# Patient Record
Sex: MALE | Race: WHITE
[De-identification: names, ages, dates, MRNs, and addresses within clinical notes are randomized per-mention and may not be internally consistent; named-entity substitution may affect disease eponyms.]

---

## 2017-12-08 ENCOUNTER — HOSPITAL ENCOUNTER (EMERGENCY)
Dept: HOSPITAL 61 - ER | Age: 77
Discharge: HOME | End: 2017-12-08
Payer: MEDICARE

## 2017-12-08 VITALS — SYSTOLIC BLOOD PRESSURE: 115 MMHG | DIASTOLIC BLOOD PRESSURE: 67 MMHG

## 2017-12-08 VITALS — HEIGHT: 69 IN | WEIGHT: 175 LBS | BODY MASS INDEX: 25.92 KG/M2
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
## 2017-12-08 DIAGNOSIS — Z86.718: ICD-10-CM

## 2017-12-08 DIAGNOSIS — S16.1XXA: Primary | ICD-10-CM
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
## 2017-12-08 PROCEDURE — 99284 EMERGENCY DEPT VISIT MOD MDM: CPT

## 2017-12-08 PROCEDURE — 96372 THER/PROPH/DIAG INJ SC/IM: CPT

## 2017-12-08 NOTE — PHYS DOC
Past Medical History


Past Medical History:  Angina, DVT, Other


Additional Past Medical Histor:  CELLULITIS, back pain,


Past Surgical History:  Other


Additional Past Surgical Histo:  R LEG SURG


Alcohol Use:  Occasionally


Drug Use:  None





Adult General


Chief Complaint


Chief Complaint:  Neck Pain





HPI


HPI





Patient is a 77  year old male who presents with complaint of left-sided neck 

pain. Patient states his symptoms started earlier this evening. The patient 

states that he fell sleep in his recliner for a nap. Patient states that he 

awoke approximately 2-3 hours prior to arrival stating that he started feeling 

sharp pain in the left side of his neck as he woke. Patient states the pain 

radiates towards the proximal portion of the left shoulder but does not radiate 

into his left arm. Patient states that the pain worsens when he tries to turn 

his head to the left. Patient states that he does not feel any significant pain 

when turning his head to the right. Patient does not have any associated chest 

pain, vision loss, difficulty with speech or swallowing, or shortness of 

breath. Patient has not taken medications for his symptoms. Patient states he 

has not experienced any pain similar to what he is complaining of at this time 

which caused him concern, thus he came to the emergency department for 

evaluation. Patient rates his pain as 9 out of 10 one every tries to turn his 

head to the left.





Review of Systems


Review of Systems





Constitutional: Denies fever or chills []


Eyes: Denies change in visual acuity, redness, or eye pain []


HENT: Denies nasal congestion or sore throat []


Respiratory: Denies cough or shortness of breath []


Cardiovascular: Denies chest pain or edema[]


GI: Denies abdominal pain, nausea, vomiting, bloody stools or diarrhea []


: Denies dysuria or hematuria []


Musculoskeletal: Left-sided neck pain[]


Integument: Denies rash or skin lesions []


Neurologic: Denies headache, focal weakness or sensory changes []








All other systems were reviewed and found to be within normal limits, except as 

documented in this note.





Current Medications


Current Medications





Current Medications








 Medications


  (Trade)  Dose


 Ordered  Sig/Ying  Start Time


 Stop Time Status Last Admin


Dose Admin


 


 Orphenadrine


 Citrate


  (Norflex)  60 mg  1X  ONCE  12/8/17 21:15


 12/8/17 21:16 DC 12/8/17 21:16


60 MG


 


 Prednisone


  (Prednisone)  40 mg  1X  ONCE  12/8/17 21:15


 12/8/17 21:16 DC 12/8/17 21:13


40 MG











Allergies


Allergies





Allergies








Coded Allergies Type Severity Reaction Last Updated Verified


 


  No Known Drug Allergies    5/6/17 No











Physical Exam


Physical Exam





Constitutional: Alert, afebrile, appears in mild to moderate discomfort[]


HENT: Normocephalic, atraumatic, bilateral external ears normal, oropharynx 

moist, no oral exudates, nose normal. []


Eyes: PERRLA, EOMI, conjunctiva normal, no discharge. [] 


Neck: Tenderness palpation along left sternocleidomastoid muscle causing 

reproducible tenderness, palpable spasm, no midline tenderness, trachea midline

, no stridor. [] 


Cardiovascular:Heart rate regular rhythm, no murmur []


Lungs & Thorax:  Bilateral breath sounds clear to auscultation []


Abdomen: Bowel sounds normal, soft, no tenderness, no masses, no pulsatile 

masses. [] 


Skin: Warm, dry, no erythema, no rash. [] 


Back: No tenderness, no CVA tenderness. [] 


Extremities: No tenderness, no cyanosis, no clubbing, ROM intact, no edema. [] 


Neurologic: Alert and oriented X 3, normal motor function, normal sensory 

function, no focal deficits noted. []





Current Patient Data


Vital Signs





 Vital Signs








  Date Time  Temp Pulse Resp B/P (MAP) Pulse Ox O2 Delivery O2 Flow Rate FiO2


 


12/8/17 20:30 98.4 106 14 155/79 (104) 94 Room Air  





 98.4       











EKG


EKG


Not performed[]





Radiology/Procedures


Radiology/Procedures


Not performed[]





Course & Med Decision Making


Course & Med Decision Making


Pertinent Labs and Imaging studies reviewed. (See chart for details)





Patient was given IM Norflex and oral prednisone in the emergency department. 

The patient's symptoms appear consistent with acute neck muscle strain versus 

possible cervical radiculopathy. Patient will continue on Flexeril and 

prednisone for outpatient treatment. Advise follow-up in 3-5 days a primary 

doctor for reevaluation and return to emergency department for any worsening 

symptoms. Patient voiced understanding and in agreement with treatment plan.





Dragon Disclaimer


Dragon Disclaimer


This electronic medical record was generated, in whole or in part, using a 

voice recognition dictation system.





Departure


Departure


Impression:  


 Primary Impression:  


 Strain of sternocleidomastoid muscle


Disposition:  01 HOME, SELF-CARE


Condition:  IMPROVED


Referrals:  


NO PCP (PCP)


Patient Instructions:  Muscle Strain





Additional Instructions:  


Follow-up with your primary doctor in 3-5 days for reevaluation. Return to 

emergency department for any worsening symptoms.


Scripts


Cyclobenzaprine Hcl (CYCLOBENZAPRINE HCL) 10 Mg Tablet


1 TAB PO QHS Y for MUSCLE PAIN, #30 TAB


   Prov: NAKUL CERRATO MD         12/8/17 


Methylprednisolone (MEDROL) 4 Mg Tab.ds.pk


1 PKG PO UD, #1 PKG


   Prov: NAKUL CERRATO MD         12/8/17





Problem Qualifiers








 Primary Impression:  


 Strain of sternocleidomastoid muscle


 Encounter type:  initial encounter  Qualified Codes:  S16.1XXA - Strain of 

muscle, fascia and tendon at neck level, initial encounter








NAKUL CERRATO MD Dec 8, 2017 21:32

## 2018-12-19 ENCOUNTER — HOSPITAL ENCOUNTER (INPATIENT)
Dept: HOSPITAL 61 - ER | Age: 78
LOS: 9 days | Discharge: HOME | DRG: 872 | End: 2018-12-28
Attending: INTERNAL MEDICINE | Admitting: INTERNAL MEDICINE
Payer: MEDICARE

## 2018-12-19 VITALS — SYSTOLIC BLOOD PRESSURE: 136 MMHG | DIASTOLIC BLOOD PRESSURE: 73 MMHG

## 2018-12-19 VITALS — WEIGHT: 161.44 LBS | HEIGHT: 70 IN | BODY MASS INDEX: 23.11 KG/M2

## 2018-12-19 DIAGNOSIS — M19.90: ICD-10-CM

## 2018-12-19 DIAGNOSIS — F17.210: ICD-10-CM

## 2018-12-19 DIAGNOSIS — D45: ICD-10-CM

## 2018-12-19 DIAGNOSIS — L97.529: ICD-10-CM

## 2018-12-19 DIAGNOSIS — I10: ICD-10-CM

## 2018-12-19 DIAGNOSIS — N40.0: ICD-10-CM

## 2018-12-19 DIAGNOSIS — K40.90: ICD-10-CM

## 2018-12-19 DIAGNOSIS — Z79.01: ICD-10-CM

## 2018-12-19 DIAGNOSIS — E11.621: ICD-10-CM

## 2018-12-19 DIAGNOSIS — Z86.711: ICD-10-CM

## 2018-12-19 DIAGNOSIS — E87.6: ICD-10-CM

## 2018-12-19 DIAGNOSIS — L03.115: ICD-10-CM

## 2018-12-19 DIAGNOSIS — A41.9: Primary | ICD-10-CM

## 2018-12-19 DIAGNOSIS — Z83.3: ICD-10-CM

## 2018-12-19 DIAGNOSIS — Z82.49: ICD-10-CM

## 2018-12-19 DIAGNOSIS — I74.5: ICD-10-CM

## 2018-12-19 DIAGNOSIS — E11.51: ICD-10-CM

## 2018-12-19 DIAGNOSIS — Z86.718: ICD-10-CM

## 2018-12-19 DIAGNOSIS — D69.6: ICD-10-CM

## 2018-12-19 DIAGNOSIS — L97.819: ICD-10-CM

## 2018-12-19 DIAGNOSIS — L97.519: ICD-10-CM

## 2018-12-19 DIAGNOSIS — I25.10: ICD-10-CM

## 2018-12-19 DIAGNOSIS — E78.5: ICD-10-CM

## 2018-12-19 DIAGNOSIS — Z91.19: ICD-10-CM

## 2018-12-19 DIAGNOSIS — J44.9: ICD-10-CM

## 2018-12-19 LAB
ALBUMIN SERPL-MCNC: 3.5 G/DL (ref 3.4–5)
ALBUMIN/GLOB SERPL: 0.9 {RATIO} (ref 1–1.7)
ALP SERPL-CCNC: 101 U/L (ref 46–116)
ALT SERPL-CCNC: 15 U/L (ref 16–63)
ANION GAP SERPL CALC-SCNC: 8 MMOL/L (ref 6–14)
AST SERPL-CCNC: 16 U/L (ref 15–37)
BASOPHILS # BLD AUTO: 0 X10^3/UL (ref 0–0.2)
BASOPHILS NFR BLD: 0 % (ref 0–3)
BILIRUB SERPL-MCNC: 0.6 MG/DL (ref 0.2–1)
BUN SERPL-MCNC: 10 MG/DL (ref 8–26)
BUN/CREAT SERPL: 10 (ref 6–20)
CALCIUM SERPL-MCNC: 9.2 MG/DL (ref 8.5–10.1)
CHLORIDE SERPL-SCNC: 103 MMOL/L (ref 98–107)
CO2 SERPL-SCNC: 29 MMOL/L (ref 21–32)
CREAT SERPL-MCNC: 1 MG/DL (ref 0.7–1.3)
CRP SERPL-MCNC: 7.7 MG/L (ref 0–3.3)
EOSINOPHIL NFR BLD: 0.1 X10^3/UL (ref 0–0.7)
EOSINOPHIL NFR BLD: 2 % (ref 0–3)
ERYTHROCYTE [DISTWIDTH] IN BLOOD BY AUTOMATED COUNT: 17.2 % (ref 11.5–14.5)
GFR SERPLBLD BASED ON 1.73 SQ M-ARVRAT: 72.3 ML/MIN
GLOBULIN SER-MCNC: 3.9 G/DL (ref 2.2–3.8)
GLUCOSE SERPL-MCNC: 88 MG/DL (ref 70–99)
HCT VFR BLD CALC: 58 % (ref 39–53)
HGB BLD-MCNC: 20.1 G/DL (ref 13–17.5)
LYMPHOCYTES # BLD: 1.4 X10^3/UL (ref 1–4.8)
LYMPHOCYTES NFR BLD AUTO: 19 % (ref 24–48)
MCH RBC QN AUTO: 33 PG (ref 25–35)
MCHC RBC AUTO-ENTMCNC: 35 G/DL (ref 31–37)
MCV RBC AUTO: 94 FL (ref 79–100)
MONO #: 0.9 X10^3/UL (ref 0–1.1)
MONOCYTES NFR BLD: 12 % (ref 0–9)
NEUT #: 4.9 X10^3UL (ref 1.8–7.7)
NEUTROPHILS NFR BLD AUTO: 67 % (ref 31–73)
PLATELET # BLD AUTO: 148 X10^3/UL (ref 140–400)
POTASSIUM SERPL-SCNC: 4.4 MMOL/L (ref 3.5–5.1)
PROT SERPL-MCNC: 7.4 G/DL (ref 6.4–8.2)
RBC # BLD AUTO: 6.19 X10^6/UL (ref 4.3–5.7)
SODIUM SERPL-SCNC: 140 MMOL/L (ref 136–145)
WBC # BLD AUTO: 7.4 X10^3/UL (ref 4–11)

## 2018-12-19 PROCEDURE — 85651 RBC SED RATE NONAUTOMATED: CPT

## 2018-12-19 PROCEDURE — 75635 CT ANGIO ABDOMINAL ARTERIES: CPT

## 2018-12-19 PROCEDURE — 85025 COMPLETE CBC W/AUTO DIFF WBC: CPT

## 2018-12-19 PROCEDURE — 96374 THER/PROPH/DIAG INJ IV PUSH: CPT

## 2018-12-19 PROCEDURE — 87075 CULTR BACTERIA EXCEPT BLOOD: CPT

## 2018-12-19 PROCEDURE — 71275 CT ANGIOGRAPHY CHEST: CPT

## 2018-12-19 PROCEDURE — 93880 EXTRACRANIAL BILAT STUDY: CPT

## 2018-12-19 PROCEDURE — 73630 X-RAY EXAM OF FOOT: CPT

## 2018-12-19 PROCEDURE — 77012 CT SCAN FOR NEEDLE BIOPSY: CPT

## 2018-12-19 PROCEDURE — 36415 COLL VENOUS BLD VENIPUNCTURE: CPT

## 2018-12-19 PROCEDURE — 93017 CV STRESS TEST TRACING ONLY: CPT

## 2018-12-19 PROCEDURE — 88184 FLOWCYTOMETRY/ TC 1 MARKER: CPT

## 2018-12-19 PROCEDURE — 85610 PROTHROMBIN TIME: CPT

## 2018-12-19 PROCEDURE — 93971 EXTREMITY STUDY: CPT

## 2018-12-19 PROCEDURE — 86140 C-REACTIVE PROTEIN: CPT

## 2018-12-19 PROCEDURE — 99152 MOD SED SAME PHYS/QHP 5/>YRS: CPT

## 2018-12-19 PROCEDURE — 80048 BASIC METABOLIC PNL TOTAL CA: CPT

## 2018-12-19 PROCEDURE — 81270 JAK2 GENE: CPT

## 2018-12-19 PROCEDURE — 78452 HT MUSCLE IMAGE SPECT MULT: CPT

## 2018-12-19 PROCEDURE — 87071 CULTURE AEROBIC QUANT OTHER: CPT

## 2018-12-19 PROCEDURE — A9500 TC99M SESTAMIBI: HCPCS

## 2018-12-19 PROCEDURE — 93970 EXTREMITY STUDY: CPT

## 2018-12-19 PROCEDURE — 71046 X-RAY EXAM CHEST 2 VIEWS: CPT

## 2018-12-19 PROCEDURE — 38222 DX BONE MARROW BX & ASPIR: CPT

## 2018-12-19 PROCEDURE — 87040 BLOOD CULTURE FOR BACTERIA: CPT

## 2018-12-19 PROCEDURE — 80053 COMPREHEN METABOLIC PANEL: CPT

## 2018-12-19 PROCEDURE — 80202 ASSAY OF VANCOMYCIN: CPT

## 2018-12-19 PROCEDURE — 88237 TISSUE CULTURE BONE MARROW: CPT

## 2018-12-19 PROCEDURE — 93306 TTE W/DOPPLER COMPLETE: CPT

## 2018-12-19 PROCEDURE — 88185 FLOWCYTOMETRY/TC ADD-ON: CPT

## 2018-12-19 PROCEDURE — 82565 ASSAY OF CREATININE: CPT

## 2018-12-19 PROCEDURE — 85045 AUTOMATED RETICULOCYTE COUNT: CPT

## 2018-12-19 NOTE — RAD
Examination: VENOUS LOWER EXTREMITY RIGHT

 

History: RT FOOT PAIN, OPEN WOUND RT FOOT, THICK SCALY SKIN RT LOWER 

LEG<BR><BR>NO EVIDENCE OF DVT<BR>REACTIVE LYMPH NODE IN 

GROIN<BR><BR>LIMITED VIS OF CALF VEINS

 

Comparison/Correlation: None

 

Findings: Right lower extremity venous duplex ultrasound exam was 

performed. Visualization of the calf veins is limited. Compression and 

augmentation utilized. Spectral, color Doppler, and grayscale imaging was 

performed. Right common femoral, superficial femoral, popliteal, and 

partially visualized posterior tibial veins are unremarkable. Partially 

visualized right profunda femoris vein is unremarkable.

 

Right groin lymph node is borderline and presumably reactive.

 

 

Impression:

No right lower extremity DVT.

 

Electronically signed by: Sanford Bryant MD (12/19/2018 9:10 PM) [facility]

## 2018-12-19 NOTE — PHYS DOC
Past Medical History


Past Medical History:  Angina, DVT, Other


Additional Past Medical Histor:  CELLULITIS, back pain,


Past Surgical History:  Other


Additional Past Surgical Histo:  R LEG SURG


Alcohol Use:  Occasionally


Drug Use:  None





Adult General


Chief Complaint


Chief Complaint:  FOOT INJURY PAIN





HPI


HPI





Patient is a 78  year old  male with history of DVT, cellulitis who 

presents with right foot read leg swelling for the past several days with deep 

foot ulcer over the dorsum of right lateral mid foot. Patient states ulcer 

began as a blister which burst and he then developed a rash. Patient denies 

fever chills, nausea vomiting or sweats. He has not seen by a doctor for this 

condition. Denies injury.[]





Review of Systems


Review of Systems


ROS as per HPI





All other systems were reviewed and found to be within normal limits, except as 

documented in this note.





Current Medications


Current Medications





Current Medications








 Medications


  (Trade)  Dose


 Ordered  Sig/Ying  Start Time


 Stop Time Status Last Admin


Dose Admin


 


 Sodium Chloride  1,000 ml @ 


 1,000 mls/hr  1X  ONCE  12/19/18 19:00


 12/19/18 19:59  12/19/18 19:19


1,000 MLS/HR











Allergies


Allergies





Allergies








Coded Allergies Type Severity Reaction Last Updated Verified


 


  No Known Drug Allergies    5/6/17 No











Physical Exam


Physical Exam





Constitutional: Well developed, well nourished, no acute distress, non-toxic 

appearance. []


HENT: Normocephalic, atraumatic, bilateral external ears normal, oropharynx 

moist,, nose normal. []


Eyes: PERRLA, EOMI, conjunctiva normal, no discharge. [] 


Neck: Normal range of motion, no tenderness, supple, no stridor. [] 


Cardiovascular:Heart rate regular rhythm, no murmur []


Lungs & Thorax:  Bilateral breath sounds clear to auscultation []


Abdomen: Bowel sounds normal, soft, no tenderness, no masses, no pulsatile 

masses. [] 


Skin: Ichthyosis of B lower extremity with warm, dry, cracked skin. Moderate 

erythema involving R foot and calf, extending from toes toes to mid calf.  Deep 

ulcer over medial aspect of R lateral forefoot. 1+ bipedal pulses, symmetric.[]

  


Extremities: of bilateral lower extremities, negative Homans sign. [] 


Neurologic: Alert and oriented X 3, normal motor function, normal sensory 

function. []


Psychologic: Affect normal, judgement normal, mood normal. []





Current Patient Data


Vital Signs





 Vital Signs








  Date Time  Temp Pulse Resp B/P (MAP) Pulse Ox O2 Delivery O2 Flow Rate FiO2


 


12/19/18 18:30 98.5 107 20 130/70 (90) 93 Room Air  





 98.5       











EKG


EKG


[]





Radiology/Procedures


Radiology/Procedures


[XR r foot: No obvious displaced fx on preliminary ED read. ]





Course & Med Decision Making


Course & Med Decision Making


Pertinent Labs and Imaging studies reviewed. (See chart for details)





[Right foot ulcer with cellulitis of the right lower extremity. Patient 

clinically does not appear to be septic. IV fluids antibiotics started. Will 

admit to the hospital service.]





Dragon Disclaimer


Dragon Disclaimer


This electronic medical record was generated, in whole or in part, using a 

voice recognition dictation system.





Departure


Departure


Impression:  


 Primary Impression:  


 Right foot ulcer


 Additional Impression:  


 Cellulitis of right lower leg


Disposition:  09 ADMITTED AS INPATIENT


Admitting Physician:  Chiquita Saldana


Referrals:  


NO PCP (PCP)





Problem Qualifiers











ALFONSO SALEH DO Dec 19, 2018 19:36

## 2018-12-20 VITALS — DIASTOLIC BLOOD PRESSURE: 73 MMHG | SYSTOLIC BLOOD PRESSURE: 121 MMHG

## 2018-12-20 VITALS — DIASTOLIC BLOOD PRESSURE: 56 MMHG | SYSTOLIC BLOOD PRESSURE: 95 MMHG

## 2018-12-20 VITALS — SYSTOLIC BLOOD PRESSURE: 102 MMHG | DIASTOLIC BLOOD PRESSURE: 46 MMHG

## 2018-12-20 VITALS — SYSTOLIC BLOOD PRESSURE: 134 MMHG | DIASTOLIC BLOOD PRESSURE: 69 MMHG

## 2018-12-20 VITALS — DIASTOLIC BLOOD PRESSURE: 50 MMHG | SYSTOLIC BLOOD PRESSURE: 99 MMHG

## 2018-12-20 VITALS — DIASTOLIC BLOOD PRESSURE: 50 MMHG | SYSTOLIC BLOOD PRESSURE: 104 MMHG

## 2018-12-20 LAB
ALBUMIN SERPL-MCNC: 3.1 G/DL (ref 3.4–5)
ALBUMIN/GLOB SERPL: 0.9 {RATIO} (ref 1–1.7)
ALP SERPL-CCNC: 86 U/L (ref 46–116)
ALT SERPL-CCNC: 11 U/L (ref 16–63)
ANION GAP SERPL CALC-SCNC: 10 MMOL/L (ref 6–14)
AST SERPL-CCNC: 13 U/L (ref 15–37)
BASOPHILS # BLD AUTO: 0 X10^3/UL (ref 0–0.2)
BASOPHILS NFR BLD: 0 % (ref 0–3)
BILIRUB SERPL-MCNC: 0.8 MG/DL (ref 0.2–1)
BUN SERPL-MCNC: 7 MG/DL (ref 8–26)
BUN/CREAT SERPL: 8 (ref 6–20)
CALCIUM SERPL-MCNC: 8.6 MG/DL (ref 8.5–10.1)
CHLORIDE SERPL-SCNC: 107 MMOL/L (ref 98–107)
CO2 SERPL-SCNC: 25 MMOL/L (ref 21–32)
CREAT SERPL-MCNC: 0.9 MG/DL (ref 0.7–1.3)
EOSINOPHIL NFR BLD: 0.1 X10^3/UL (ref 0–0.7)
EOSINOPHIL NFR BLD: 2 % (ref 0–3)
ERYTHROCYTE [DISTWIDTH] IN BLOOD BY AUTOMATED COUNT: 17 % (ref 11.5–14.5)
GFR SERPLBLD BASED ON 1.73 SQ M-ARVRAT: 81.6 ML/MIN
GLOBULIN SER-MCNC: 3.3 G/DL (ref 2.2–3.8)
GLUCOSE SERPL-MCNC: 105 MG/DL (ref 70–99)
HCT VFR BLD CALC: 56 % (ref 39–53)
HGB BLD-MCNC: 18.7 G/DL (ref 13–17.5)
LYMPHOCYTES # BLD: 1.3 X10^3/UL (ref 1–4.8)
LYMPHOCYTES NFR BLD AUTO: 17 % (ref 24–48)
MCH RBC QN AUTO: 32 PG (ref 25–35)
MCHC RBC AUTO-ENTMCNC: 33 G/DL (ref 31–37)
MCV RBC AUTO: 95 FL (ref 79–100)
MONO #: 0.9 X10^3/UL (ref 0–1.1)
MONOCYTES NFR BLD: 11 % (ref 0–9)
NEUT #: 5.5 X10^3UL (ref 1.8–7.7)
NEUTROPHILS NFR BLD AUTO: 70 % (ref 31–73)
PLATELET # BLD AUTO: 133 X10^3/UL (ref 140–400)
POTASSIUM SERPL-SCNC: 3.9 MMOL/L (ref 3.5–5.1)
PROT SERPL-MCNC: 6.4 G/DL (ref 6.4–8.2)
RBC # BLD AUTO: 5.89 X10^6/UL (ref 4.3–5.7)
SODIUM SERPL-SCNC: 142 MMOL/L (ref 136–145)
WBC # BLD AUTO: 7.9 X10^3/UL (ref 4–11)

## 2018-12-20 RX ADMIN — Medication SCH CAP: at 20:01

## 2018-12-20 RX ADMIN — FENTANYL CITRATE PRN MCG: 50 INJECTION INTRAMUSCULAR; INTRAVENOUS at 09:14

## 2018-12-20 RX ADMIN — FENTANYL CITRATE PRN MCG: 50 INJECTION INTRAMUSCULAR; INTRAVENOUS at 01:58

## 2018-12-20 RX ADMIN — VANCOMYCIN HYDROCHLORIDE SCH MLS/HR: 1 INJECTION, POWDER, FOR SOLUTION INTRAVENOUS at 09:14

## 2018-12-20 RX ADMIN — BACITRACIN SCH MLS/HR: 5000 INJECTION, POWDER, FOR SOLUTION INTRAMUSCULAR at 01:08

## 2018-12-20 RX ADMIN — BACITRACIN SCH MLS/HR: 5000 INJECTION, POWDER, FOR SOLUTION INTRAMUSCULAR at 09:14

## 2018-12-20 RX ADMIN — APIXABAN SCH MG: 5 TABLET, FILM COATED ORAL at 17:13

## 2018-12-20 RX ADMIN — VANCOMYCIN HYDROCHLORIDE PRN EACH: 1 INJECTION, POWDER, LYOPHILIZED, FOR SOLUTION INTRAVENOUS at 02:01

## 2018-12-20 RX ADMIN — Medication SCH CAP: at 09:15

## 2018-12-20 RX ADMIN — BACITRACIN SCH MLS/HR: 5000 INJECTION, POWDER, FOR SOLUTION INTRAMUSCULAR at 20:01

## 2018-12-20 RX ADMIN — VANCOMYCIN HYDROCHLORIDE SCH MLS/HR: 1 INJECTION, POWDER, FOR SOLUTION INTRAVENOUS at 20:10

## 2018-12-20 RX ADMIN — FENTANYL CITRATE PRN MCG: 50 INJECTION INTRAMUSCULAR; INTRAVENOUS at 13:59

## 2018-12-20 RX ADMIN — FENTANYL CITRATE PRN MCG: 50 INJECTION INTRAMUSCULAR; INTRAVENOUS at 20:01

## 2018-12-20 NOTE — RAD
Examination: 3 views of the right foot

 

HISTORY: History of infection of the right foot

 

COMPARISON: None available

 

FINDINGS:

 

The alignment of the tarsal bones grossly appears unremarkable. The 

tarsometatarsal joints, tarsophalangeal, interphalangeal joints grossly 

appears unremarkable. Mild soft tissue swelling identified dorsal to the 

metatarsals.

 

IMPRESSION:

 

1. Mild soft tissue swelling identified dorsal to the metatarsal could be 

edema or soft tissue infection.

 

2. No acute osseous findings.

 

 

 

Electronically signed by: Lupillo Brown MD (12/20/2018 8:41 AM) [id number]

## 2018-12-21 VITALS — SYSTOLIC BLOOD PRESSURE: 112 MMHG | DIASTOLIC BLOOD PRESSURE: 59 MMHG

## 2018-12-21 VITALS — SYSTOLIC BLOOD PRESSURE: 120 MMHG | DIASTOLIC BLOOD PRESSURE: 55 MMHG

## 2018-12-21 VITALS — DIASTOLIC BLOOD PRESSURE: 50 MMHG | SYSTOLIC BLOOD PRESSURE: 102 MMHG

## 2018-12-21 VITALS — DIASTOLIC BLOOD PRESSURE: 45 MMHG | SYSTOLIC BLOOD PRESSURE: 90 MMHG

## 2018-12-21 VITALS — DIASTOLIC BLOOD PRESSURE: 51 MMHG | SYSTOLIC BLOOD PRESSURE: 112 MMHG

## 2018-12-21 VITALS — SYSTOLIC BLOOD PRESSURE: 116 MMHG | DIASTOLIC BLOOD PRESSURE: 53 MMHG

## 2018-12-21 LAB — VANC TR: 15.6 MCG/ML (ref 10–20)

## 2018-12-21 RX ADMIN — Medication SCH CAP: at 21:12

## 2018-12-21 RX ADMIN — FENTANYL CITRATE PRN MCG: 50 INJECTION INTRAMUSCULAR; INTRAVENOUS at 09:05

## 2018-12-21 RX ADMIN — Medication SCH CAP: at 09:04

## 2018-12-21 RX ADMIN — FENTANYL CITRATE PRN MCG: 50 INJECTION INTRAMUSCULAR; INTRAVENOUS at 01:04

## 2018-12-21 RX ADMIN — APIXABAN SCH MG: 5 TABLET, FILM COATED ORAL at 21:12

## 2018-12-21 RX ADMIN — VANCOMYCIN HYDROCHLORIDE SCH MLS/HR: 1 INJECTION, POWDER, FOR SOLUTION INTRAVENOUS at 09:04

## 2018-12-21 RX ADMIN — MAGNESIUM HYDROXIDE PRN MG: 400 SUSPENSION ORAL at 14:28

## 2018-12-21 RX ADMIN — APIXABAN SCH MG: 5 TABLET, FILM COATED ORAL at 09:04

## 2018-12-21 RX ADMIN — VANCOMYCIN HYDROCHLORIDE PRN EACH: 1 INJECTION, POWDER, LYOPHILIZED, FOR SOLUTION INTRAVENOUS at 13:03

## 2018-12-21 RX ADMIN — VANCOMYCIN HYDROCHLORIDE SCH MLS/HR: 1 INJECTION, POWDER, FOR SOLUTION INTRAVENOUS at 21:10

## 2018-12-21 NOTE — PDOC2
CONSULT


Date of Consult


Date of Consult


DATE: 12/21/18 


TIME: 17:20





Reason for Consult


Reason for Consult:


Painful ulceration right foot and right shin





Identification/Chief Complaint


Chief Complaint


Painful ulceration of the right shin and right foot





History of Present Illness


Reason for Visit:


This is a 78-year-old male with a long-standing history of chronic tobacco use. 

He has known peripheral vascular disease.





He had a CTA performed May 2017.





CTA showed that he had a right common iliac and external iliac artery 

occlusion. He reconstitutes his profunda femoris artery and he had a right 

superficial femoral artery occlusion with reconstitution of the popliteal 

artery. Despite extensive arterial occlusive disease patient was not having any 

right leg symptoms.





In addition the patient had a focal high-grade left common iliac artery 

stenosis.





Patient had a long-standing history of chronic tobacco use and underlying 

polycythemia





This admission patient comes in with a painful ulceration anterolateral aspect 

of his right shin and an open ulcer on the right foot.





He has a history of peripheral edema which is under control at this point in 

time with diuretic therapy.





His hemoglobin remains elevated





Past Medical History


Cardiovascular:  No pertinent hx, Hyperlipidemia


Pulmonary:  No pertinent hx, COPD


GI:  No pertinent hx


Hepatobiliary:  No pertinent hx


Psych:  Addictions


Musculoskeletal:  Osteoarthritis


Infectious disease:  Other


Renal/:  No pertinent hx


Endocrine:  No pertinent hx





Past Surgical History


Past Surgical History


Right femoral popliteal bypass graft in 2004 by Dr. Davila





Family History


Family History:  No Significant, High Cholestrol





Social History


<1 pack per day


ALCOHOL:  rare


Drugs:  None





Current Problem List


Problem List


Problems


Medical Problems:


(1) Cellulitis of right lower leg


Status: Acute  





(2) Right foot ulcer


Status: Acute  











Current Medications


Current Medications





Current Medications


Sodium Chloride 1,000 ml @  1,000 mls/hr 1X  ONCE IV  Last administered on 12/19 /18at 19:19;  Start 12/19/18 at 19:00;  Stop 12/19/18 at 19:59;  Status DC


Vancomycin HCl (Vanco Per Pharmacy) 1 each PRN DAILY  PRN MC SEE COMMENTS Last 

administered on 12/21/18at 13:03;  Start 12/19/18 at 19:45


Vancomycin HCl 2 gm/Sodium Chloride 500 ml @  250 mls/hr ONCE  ONCE IV  Last 

administered on 12/19/18at 20:00;  Start 12/19/18 at 19:45;  Stop 12/19/18 at 21

:44;  Status DC


Ondansetron HCl (Zofran) 4 mg PRN Q8HRS  PRN IV NAUSEA/VOMITING 1ST CHOICE;  

Start 12/19/18 at 21:15;  Stop 12/20/18 at 21:14;  Status DC


Sodium Chloride 1,000 ml @  125 mls/hr Q8H IV  Last administered on 12/20/18at 

20:01;  Start 12/19/18 at 21:03;  Stop 12/20/18 at 21:02;  Status DC


Fentanyl Citrate (Fentanyl 2ml Vial) 25 mcg PRN Q2HR  PRN IV SEVERE PAIN Last 

administered on 12/20/18at 13:59;  Start 12/20/18 at 01:45;  Stop 12/20/18 at 14

:39;  Status DC


Vancomycin HCl 1.25 gm/Sodium Chloride 250 ml @  167 mls/hr Q12H IV  Last 

administered on 12/21/18at 09:04;  Start 12/20/18 at 08:00


Vancomycin HCl (Vancomycin Trough Level) 1 each 1X  ONCE MC ;  Start 12/22/18 

at 07:30;  Stop 12/22/18 at 07:30;  Status DC


Lactobacillus Rhamnosus (Culturelle) 1 cap BID PO  Last administered on 12/21/ 18at 09:04;  Start 12/20/18 at 09:00


Fentanyl Citrate (Fentanyl 2ml Vial) 50 mcg PRN Q2HR  PRN IV SEVERE PAIN Last 

administered on 12/21/18at 09:05;  Start 12/20/18 at 14:45


Apixaban (Eliquis) 5 mg BID PO  Last administered on 12/21/18at 09:04;  Start 12 /20/18 at 15:00


Acetaminophen/ Hydrocodone Bitart (Lortab 7.5/325) 1 tab PRN Q4HRS  PRN PO PAIN

;  Start 12/21/18 at 11:15


Info (Anti-Coagulation Monitoring By Pharmacy) 1 each PRN DAILY  PRN MC SEE 

COMMENTS;  Start 12/21/18 at 11:30


Magnesium Hydroxide (Milk Of Magnesia) 2,400 mg PRN DAILY  PRN PO CONSTIPATION 

Last administered on 12/21/18at 14:28;  Start 12/21/18 at 14:15





Active Scripts


Active


Reported


Eliquis (Apixaban) 5 Mg Tablet 5 Mg PO BID





Allergies


Allergies:  


Coded Allergies:  


     No Known Drug Allergies (Unverified , 5/6/17)





Physical Exam


Physical Exam


2+ radial pulses bilaterally 


absent femoral pulses bilaterally


Absent popliteal pulses bilaterally 


absent pedal pulses bilaterally


General:  Alert, Oriented X3


Abdomen:  Normal bowel sounds, No tenderness, Other


MUSCULOSKELETAL:  Other (open ulcer on the anterolateral aspect of the right 

foot which extends down through the skin and subcutaneous tissue)





Vitals


VITALS





Vital Signs








  Date Time  Temp Pulse Resp B/P (MAP) Pulse Ox O2 Delivery O2 Flow Rate FiO2


 


12/21/18 15:00 98.8 85 20 102/50 (67) 94 Room Air  





 98.8       


 


12/20/18 15:00       2.0 











Labs


Labs





Laboratory Tests








Test


 12/19/18


19:20 12/20/18


08:35 12/21/18


07:35


 


White Blood Count


 7.4 x10^3/uL


(4.0-11.0) 7.9 x10^3/uL


(4.0-11.0) 





 


Red Blood Count


 6.19 x10^6/uL


(4.30-5.70) 5.89 x10^6/uL


(4.30-5.70) 





 


Hemoglobin


 20.1 g/dL


(13.0-17.5) 18.7 g/dL


(13.0-17.5) 





 


Hematocrit


 58.0 %


(39.0-53.0) 56.0 %


(39.0-53.0) 





 


Mean Corpuscular Volume 94 fL ()  95 fL ()  


 


Mean Corpuscular Hemoglobin 33 pg (25-35)  32 pg (25-35)  


 


Mean Corpuscular Hemoglobin


Concent 35 g/dL


(31-37) 33 g/dL


(31-37) 





 


Red Cell Distribution Width


 17.2 %


(11.5-14.5) 17.0 %


(11.5-14.5) 





 


Platelet Count


 148 x10^3/uL


(140-400) 133 x10^3/uL


(140-400) 





 


Neutrophils (%) (Auto) 67 % (31-73)  70 % (31-73)  


 


Lymphocytes (%) (Auto) 19 % (24-48)  17 % (24-48)  


 


Monocytes (%) (Auto) 12 % (0-9)  11 % (0-9)  


 


Eosinophils (%) (Auto) 2 % (0-3)  2 % (0-3)  


 


Basophils (%) (Auto) 0 % (0-3)  0 % (0-3)  


 


Neutrophils # (Auto)


 4.9 x10^3uL


(1.8-7.7) 5.5 x10^3uL


(1.8-7.7) 





 


Lymphocytes # (Auto)


 1.4 x10^3/uL


(1.0-4.8) 1.3 x10^3/uL


(1.0-4.8) 





 


Monocytes # (Auto)


 0.9 x10^3/uL


(0.0-1.1) 0.9 x10^3/uL


(0.0-1.1) 





 


Eosinophils # (Auto)


 0.1 x10^3/uL


(0.0-0.7) 0.1 x10^3/uL


(0.0-0.7) 





 


Basophils # (Auto)


 0.0 x10^3/uL


(0.0-0.2) 0.0 x10^3/uL


(0.0-0.2) 





 


Erythrocyte Sedimentation Rate 0 (0-15)   


 


Sodium Level


 140 mmol/L


(136-145) 142 mmol/L


(136-145) 





 


Potassium Level


 4.4 mmol/L


(3.5-5.1) 3.9 mmol/L


(3.5-5.1) 





 


Chloride Level


 103 mmol/L


() 107 mmol/L


() 





 


Carbon Dioxide Level


 29 mmol/L


(21-32) 25 mmol/L


(21-32) 





 


Anion Gap 8 (6-14)  10 (6-14)  


 


Blood Urea Nitrogen


 10 mg/dL


(8-26) 7 mg/dL (8-26) 


 





 


Creatinine


 1.0 mg/dL


(0.7-1.3) 0.9 mg/dL


(0.7-1.3) 





 


Estimated GFR


(Cockcroft-Gault) 72.3 


 81.6 


 





 


BUN/Creatinine Ratio 10 (6-20)  8 (6-20)  


 


Glucose Level


 88 mg/dL


(70-99) 105 mg/dL


(70-99) 





 


Calcium Level


 9.2 mg/dL


(8.5-10.1) 8.6 mg/dL


(8.5-10.1) 





 


Total Bilirubin


 0.6 mg/dL


(0.2-1.0) 0.8 mg/dL


(0.2-1.0) 





 


Aspartate Amino Transf


(AST/SGOT) 16 U/L (15-37) 


 13 U/L (15-37) 


 





 


Alanine Aminotransferase


(ALT/SGPT) 15 U/L (16-63) 


 11 U/L (16-63) 


 





 


Alkaline Phosphatase


 101 U/L


() 86 U/L


() 





 


C-Reactive Protein,


Quantitative 7.7 mg/L


(0-3.3) 


 





 


Total Protein


 7.4 g/dL


(6.4-8.2) 6.4 g/dL


(6.4-8.2) 





 


Albumin


 3.5 g/dL


(3.4-5.0) 3.1 g/dL


(3.4-5.0) 





 


Albumin/Globulin Ratio 0.9 (1.0-1.7)  0.9 (1.0-1.7)  


 


Vancomycin Level Trough


 


 


 15.6 mcg/mL


(10.0-20.0)


 


Vancomycin Last Dose Date   12/20/18 


 


Vancomycin Last Dose Time   2000 








Laboratory Tests








Test


 12/21/18


07:35


 


Vancomycin Level Trough


 15.6 mcg/mL


(10.0-20.0)


 


Vancomycin Last Dose Date 12/20/18 


 


Vancomycin Last Dose Time 2000 











Assessment/Plan


Assessment/Plan


Ischemic ulceration of the right foot and right shin. Known arterial occlusive 

disease.





Given the extent of disease noted on his 2017 CTA the patient will need aorto 

femoral bypass graft and possible right femoral popliteal bypass graft.





Have recommended preoperative cardiac assessment. Patient will need to be 

evaluated  by hematologist regarding his polycythemia. His hemoglobin will need 

to be controlled around 15 g postoperatively and further duration is his 

polycythemia increases his risk of graft thrombosis Patient needs carotid 

duplex imaging studies. When Studies have been completed and consultation is 

completed we will schedule him for surgical intervention.











PEARL GRAF MD Dec 21, 2018 17:38

## 2018-12-21 NOTE — PDOC
PROGRESS NOTES


History of Present Illness


History of Present Illness





Assessment/Plan


Assessment/Plan


impression


1. cellulitis right foot and leg


 


2. Mild soft tissue swelling identified dorsal to the metatarsal could be 


edema or soft tissue infection.


 


3. No acute osseous findings.





4. tobacco abuse disorder





5.  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 Collateral flow


reconstitutes the right superficial femoral artery proximally but more


distally the vessel is severely tapered and probably occluded. There is no


significant flow seen in the right popliteal artery. There is two-vessel


runoff to the ankle from reconstituted collateral vessels. in 2017





6. HX PE


 


 


 








plan





iv vanc Q 12 HRS


ID CONSULT


WOUND CARE CONSULT


dvt prophylaxis


blood cult


vascular consult





Vitals


Vitals





Vital Signs








  Date Time  Temp Pulse Resp B/P (MAP) Pulse Ox O2 Delivery O2 Flow Rate FiO2


 


12/21/18 09:35      Room Air  


 


12/21/18 09:05     92   


 


12/21/18 07:00 98.7 83 22 116/53 (74)    





 98.7       


 


12/20/18 15:00       2.0 











Physical Exam


General:  Alert, Oriented X3, Cooperative, mild distress


Heart:  Regular rate, Normal S1, Normal S2


Lungs:  Clear


Abdomen:  Normal bowel sounds, Soft


Extremities:  No cyanosis, Other (ISCHEMIC ULCER RIGHT FOOT/ CELLULITIS)





Labs


LABS


STATUS: ADM IN ORD. PHYSICIAN: GABBY GILBERT MD 


REASON: leg pain


PROCEDURE: CT ANGIO ABD ILEO/FEMOR RUNOFF





Indication pain.





CTA targeted to the abdominal aorta and the major runoff vessels was


performed. Images were reformatted in the coronal and sagittal planes. Volume


rendered images were also generated and reviewed. Note is made of the abnormal


arterial Doppler study earlier in the day. Approximately 95 cc of Omnipaque


350 was administered. No similar imaging is available.





There is some minimal volume loss, likely reflecting scar, in the right middle


lobe. A definite significant finding and the visualized lung bases is not


seen. There is a periumbilical hernia containing a bowel loop which appears


uncomplicated. There is a right inguinal hernia containing fat also appearing


uncomplicated. The liver and spleen appear unremarkable and the gallbladder


appears grossly normal. No adrenal or renal anomalies are seen. The pancreas


appears unremarkable. Acute finding in the abdomen is not seen. The prostate


is moderately enlarged. No acute finding is seen in the pelvis. There are a


few lymph nodes seen in the groin which are likely incidental. A significant


soft tissue finding in either lower extremity is not seen





There is intimal thickening involving the abdominal aorta. There is no


aneurysm. The celiac and SMA are widely patent at their origins. The LEIGHA is


not seen. There are single main renal arteries bilaterally which are widely


patent.





The right common iliac artery is occluded. Collateral vessels reconstitute the


internal iliac artery. The superficial femoral artery reconstitutes in the


upper thigh there is severe narrowing of the more distal right superficial


femoral artery. There is no significant flow seen in the popliteal artery.


There is two-vessel runoff to the ankle.





On the left there is a web with probable moderate stenosis associated with the


common iliac artery. The internal and external iliac arteries are patent. The


common femoral is patent. There is marked narrowing of the distal superficial


femoral artery. There is no significant flow seen in the popliteal artery.


There is single vessel runoff to the ankle. The posterior tibial artery is


patent to the ankle. The peroneal artery reconstitutes in the mid calf.





IMPRESSION: The right common iliac artery is occluded. Collateral flow


reconstitutes the right superficial femoral artery proximally but more


distally the vessel is severely tapered and probably occluded. There is no


significant flow seen in the right popliteal artery. There is two-vessel


runoff to the ankle from reconstituted collateral vessels.


                        On the left there is some moderate narrowing


associated with the common iliac artery. There is marked narrowing of the


superficial femoral artery distally and no significant flow is seen in the


popliteal artery. There is single vessel runoff to the foot.


                       Ventral wall abdominal hernia and right inguinal


hernia, both of which appear uncomplicated..


                       Enlarged prostate.





PQRS Compliance Statement:


Laboratory Tests








Test


 12/21/18


07:35


 


Vancomycin Level Trough


 15.6 mcg/mL


(10.0-20.0)


 


Vancomycin Last Dose Date 12/20/18 


 


Vancomycin Last Dose Time 2000 











Assessment and Plan


Assessmemt and Plan


Problems


Medical Problems:


(1) Cellulitis of right lower leg


Status: Acute  





(2) Right foot ulcer


Status: Acute  








IMPRESSION: The right common iliac artery is occluded. Collateral flow


reconstitutes the right superficial femoral artery proximally but more


distally the vessel is severely tapered and probably occluded. There is no


significant flow seen in the right popliteal artery. There is two-vessel


runoff to the ankle from reconstituted collateral vessels.





Comment


Review of Relevant


I have reviewed the following items karen (where applicable) has been applied.


Labs





Laboratory Tests








Test


 12/19/18


19:20 12/20/18


08:35 12/21/18


07:35


 


White Blood Count


 7.4 x10^3/uL


(4.0-11.0) 7.9 x10^3/uL


(4.0-11.0) 





 


Red Blood Count


 6.19 x10^6/uL


(4.30-5.70) 5.89 x10^6/uL


(4.30-5.70) 





 


Hemoglobin


 20.1 g/dL


(13.0-17.5) 18.7 g/dL


(13.0-17.5) 





 


Hematocrit


 58.0 %


(39.0-53.0) 56.0 %


(39.0-53.0) 





 


Mean Corpuscular Volume 94 fL ()  95 fL ()  


 


Mean Corpuscular Hemoglobin 33 pg (25-35)  32 pg (25-35)  


 


Mean Corpuscular Hemoglobin


Concent 35 g/dL


(31-37) 33 g/dL


(31-37) 





 


Red Cell Distribution Width


 17.2 %


(11.5-14.5) 17.0 %


(11.5-14.5) 





 


Platelet Count


 148 x10^3/uL


(140-400) 133 x10^3/uL


(140-400) 





 


Neutrophils (%) (Auto) 67 % (31-73)  70 % (31-73)  


 


Lymphocytes (%) (Auto) 19 % (24-48)  17 % (24-48)  


 


Monocytes (%) (Auto) 12 % (0-9)  11 % (0-9)  


 


Eosinophils (%) (Auto) 2 % (0-3)  2 % (0-3)  


 


Basophils (%) (Auto) 0 % (0-3)  0 % (0-3)  


 


Neutrophils # (Auto)


 4.9 x10^3uL


(1.8-7.7) 5.5 x10^3uL


(1.8-7.7) 





 


Lymphocytes # (Auto)


 1.4 x10^3/uL


(1.0-4.8) 1.3 x10^3/uL


(1.0-4.8) 





 


Monocytes # (Auto)


 0.9 x10^3/uL


(0.0-1.1) 0.9 x10^3/uL


(0.0-1.1) 





 


Eosinophils # (Auto)


 0.1 x10^3/uL


(0.0-0.7) 0.1 x10^3/uL


(0.0-0.7) 





 


Basophils # (Auto)


 0.0 x10^3/uL


(0.0-0.2) 0.0 x10^3/uL


(0.0-0.2) 





 


Erythrocyte Sedimentation Rate 0 (0-15)   


 


Sodium Level


 140 mmol/L


(136-145) 142 mmol/L


(136-145) 





 


Potassium Level


 4.4 mmol/L


(3.5-5.1) 3.9 mmol/L


(3.5-5.1) 





 


Chloride Level


 103 mmol/L


() 107 mmol/L


() 





 


Carbon Dioxide Level


 29 mmol/L


(21-32) 25 mmol/L


(21-32) 





 


Anion Gap 8 (6-14)  10 (6-14)  


 


Blood Urea Nitrogen


 10 mg/dL


(8-26) 7 mg/dL (8-26) 


 





 


Creatinine


 1.0 mg/dL


(0.7-1.3) 0.9 mg/dL


(0.7-1.3) 





 


Estimated GFR


(Cockcroft-Gault) 72.3 


 81.6 


 





 


BUN/Creatinine Ratio 10 (6-20)  8 (6-20)  


 


Glucose Level


 88 mg/dL


(70-99) 105 mg/dL


(70-99) 





 


Calcium Level


 9.2 mg/dL


(8.5-10.1) 8.6 mg/dL


(8.5-10.1) 





 


Total Bilirubin


 0.6 mg/dL


(0.2-1.0) 0.8 mg/dL


(0.2-1.0) 





 


Aspartate Amino Transf


(AST/SGOT) 16 U/L (15-37) 


 13 U/L (15-37) 


 





 


Alanine Aminotransferase


(ALT/SGPT) 15 U/L (16-63) 


 11 U/L (16-63) 


 





 


Alkaline Phosphatase


 101 U/L


() 86 U/L


() 





 


C-Reactive Protein,


Quantitative 7.7 mg/L


(0-3.3) 


 





 


Total Protein


 7.4 g/dL


(6.4-8.2) 6.4 g/dL


(6.4-8.2) 





 


Albumin


 3.5 g/dL


(3.4-5.0) 3.1 g/dL


(3.4-5.0) 





 


Albumin/Globulin Ratio 0.9 (1.0-1.7)  0.9 (1.0-1.7)  


 


Vancomycin Level Trough


 


 


 15.6 mcg/mL


(10.0-20.0)


 


Vancomycin Last Dose Date   12/20/18 


 


Vancomycin Last Dose Time   2000 








Laboratory Tests








Test


 12/21/18


07:35


 


Vancomycin Level Trough


 15.6 mcg/mL


(10.0-20.0)


 


Vancomycin Last Dose Date 12/20/18 


 


Vancomycin Last Dose Time 2000 








Microbiology


12/19/18 Blood Culture - Preliminary, Resulted


           NO GROWTH AFTER 1 DAY


Medications





Current Medications


Sodium Chloride 1,000 ml @  1,000 mls/hr 1X  ONCE IV  Last administered on 12/19 /18at 19:19;  Start 12/19/18 at 19:00;  Stop 12/19/18 at 19:59;  Status DC


Vancomycin HCl (Vanco Per Pharmacy) 1 each PRN DAILY  PRN MC SEE COMMENTS Last 

administered on 12/20/18at 02:01;  Start 12/19/18 at 19:45


Vancomycin HCl 2 gm/Sodium Chloride 500 ml @  250 mls/hr ONCE  ONCE IV  Last 

administered on 12/19/18at 20:00;  Start 12/19/18 at 19:45;  Stop 12/19/18 at 21

:44;  Status DC


Ondansetron HCl (Zofran) 4 mg PRN Q8HRS  PRN IV NAUSEA/VOMITING 1ST CHOICE;  

Start 12/19/18 at 21:15;  Stop 12/20/18 at 21:14;  Status DC


Sodium Chloride 1,000 ml @  125 mls/hr Q8H IV  Last administered on 12/20/18at 

20:01;  Start 12/19/18 at 21:03;  Stop 12/20/18 at 21:02;  Status DC


Fentanyl Citrate (Fentanyl 2ml Vial) 25 mcg PRN Q2HR  PRN IV SEVERE PAIN Last 

administered on 12/20/18at 13:59;  Start 12/20/18 at 01:45;  Stop 12/20/18 at 14

:39;  Status DC


Vancomycin HCl 1.25 gm/Sodium Chloride 250 ml @  167 mls/hr Q12H IV  Last 

administered on 12/21/18at 09:04;  Start 12/20/18 at 08:00


Vancomycin HCl (Vancomycin Trough Level) 1 each 1X  ONCE MC ;  Start 12/22/18 

at 07:30;  Stop 12/22/18 at 07:31


Lactobacillus Rhamnosus (Culturelle) 1 cap BID PO  Last administered on 12/21/ 18at 09:04;  Start 12/20/18 at 09:00


Fentanyl Citrate (Fentanyl 2ml Vial) 50 mcg PRN Q2HR  PRN IV SEVERE PAIN Last 

administered on 12/21/18at 09:05;  Start 12/20/18 at 14:45


Apixaban (Eliquis) 5 mg BID PO  Last administered on 12/21/18at 09:04;  Start 12 /20/18 at 15:00


Acetaminophen/ Hydrocodone Bitart (Lortab 7.5/325) 1 tab PRN Q4HRS  PRN PO PAIN

;  Start 12/21/18 at 11:15


Info (Anti-Coagulation Monitoring By Pharmacy) 1 each PRN DAILY  PRN MC SEE 

COMMENTS;  Start 12/21/18 at 11:30





Active Scripts


Active


Reported


Eliquis (Apixaban) 5 Mg Tablet 5 Mg PO BID


Vitals/I & O





Vital Sign - Last 24 Hours








 12/20/18 12/20/18 12/20/18 12/20/18





 13:59 14:01 15:00 19:00


 


Temp   98.4 98.2





   98.4 98.2


 


Pulse   83 86


 


Resp   20 20


 


B/P (MAP)   99/50 (66) 102/46 (64)


 


Pulse Ox  96 92 93


 


O2 Delivery Room Air Room Air Nasal Cannula Room Air


 


O2 Flow Rate  2.0 2.0 


 


    





    





 12/20/18 12/20/18 12/20/18 12/21/18





 20:00 20:01 23:03 01:04


 


Temp   97.9 





   97.9 


 


Pulse   65 


 


Resp  20 20 20


 


B/P (MAP)   95/56 (69) 


 


Pulse Ox  93 92 92


 


O2 Delivery Room Air Room Air Room Air Room Air


 


    





    





 12/21/18 12/21/18 12/21/18 12/21/18





 01:34 03:22 07:00 09:05


 


Temp  98.5 98.7 





  98.5 98.7 


 


Pulse  83 83 


 


Resp 18 20 22 


 


B/P (MAP)  112/51 (71) 116/53 (74) 


 


Pulse Ox 93 93 92 92


 


O2 Delivery  Room Air Room Air Room Air


 


    





    





 12/21/18   





 09:35   


 


O2 Delivery Room Air   














Intake and Output   


 


 12/20/18 12/20/18 12/21/18





 15:01 23:01 07:01


 


Output Total 500 ml 200 ml 800 ml


 


Balance -500 ml -200 ml -800 ml

















SHIRLEY DEMARCO MD Dec 21, 2018 11:33

## 2018-12-22 VITALS — DIASTOLIC BLOOD PRESSURE: 51 MMHG | SYSTOLIC BLOOD PRESSURE: 96 MMHG

## 2018-12-22 VITALS — SYSTOLIC BLOOD PRESSURE: 125 MMHG | DIASTOLIC BLOOD PRESSURE: 56 MMHG

## 2018-12-22 VITALS — DIASTOLIC BLOOD PRESSURE: 48 MMHG | SYSTOLIC BLOOD PRESSURE: 102 MMHG

## 2018-12-22 VITALS — SYSTOLIC BLOOD PRESSURE: 105 MMHG | DIASTOLIC BLOOD PRESSURE: 54 MMHG

## 2018-12-22 VITALS — DIASTOLIC BLOOD PRESSURE: 52 MMHG | SYSTOLIC BLOOD PRESSURE: 103 MMHG

## 2018-12-22 VITALS — DIASTOLIC BLOOD PRESSURE: 56 MMHG | SYSTOLIC BLOOD PRESSURE: 94 MMHG

## 2018-12-22 LAB
CREAT SERPL-MCNC: 0.9 MG/DL (ref 0.7–1.3)
GFR SERPLBLD BASED ON 1.73 SQ M-ARVRAT: 81.6 ML/MIN

## 2018-12-22 RX ADMIN — VANCOMYCIN HYDROCHLORIDE SCH MLS/HR: 1 INJECTION, POWDER, FOR SOLUTION INTRAVENOUS at 20:02

## 2018-12-22 RX ADMIN — APIXABAN SCH MG: 5 TABLET, FILM COATED ORAL at 08:41

## 2018-12-22 RX ADMIN — MAGNESIUM HYDROXIDE PRN MG: 400 SUSPENSION ORAL at 20:01

## 2018-12-22 RX ADMIN — Medication SCH CAP: at 20:02

## 2018-12-22 RX ADMIN — APIXABAN SCH MG: 5 TABLET, FILM COATED ORAL at 20:02

## 2018-12-22 RX ADMIN — VANCOMYCIN HYDROCHLORIDE SCH MLS/HR: 1 INJECTION, POWDER, FOR SOLUTION INTRAVENOUS at 08:41

## 2018-12-22 RX ADMIN — PIPERACILLIN SODIUM AND TAZOBACTAM SODIUM SCH MLS/HR: 3; .375 INJECTION, POWDER, LYOPHILIZED, FOR SOLUTION INTRAVENOUS at 18:14

## 2018-12-22 RX ADMIN — PIPERACILLIN SODIUM AND TAZOBACTAM SODIUM SCH MLS/HR: 3; .375 INJECTION, POWDER, LYOPHILIZED, FOR SOLUTION INTRAVENOUS at 23:45

## 2018-12-22 RX ADMIN — VANCOMYCIN HYDROCHLORIDE PRN EACH: 1 INJECTION, POWDER, LYOPHILIZED, FOR SOLUTION INTRAVENOUS at 12:34

## 2018-12-22 RX ADMIN — Medication SCH CAP: at 08:41

## 2018-12-22 RX ADMIN — Medication PRN EACH: at 12:39

## 2018-12-22 NOTE — PDOC2
CONSULT


Date of Consult


Date of Consult


DATE: 12/22/18 


TIME: 14:04





Reason for Consult


Reason for Consult:


Probable history of coronary artery disease, preoperative evaluation





Referring Physician


Referring Physician:


Dr. Saldana





Identification/Chief Complaint


Chief Complaint


Right foot pain





Source


Source:  Chart review, Patient





History of Present Illness


Reason for Visit:


The patient is a 78-year-old male who developed a right foot ulcer and pain 

over the last several days. He came to the emergency room for workup. He is a 

somewhat difficult historian but does have a history of possible coronary 

artery disease, a pulmonary embolism, cellulitis, peripheral arterial disease. 

Initial workup on CT scanning shows severe peripheral arterial disease with an 

occluded right common iliac and severe disease in the left superficial femoral 

artery. He has an ulcer on his right foot and has been evaluated by the 

vascular surgery service probable upcoming surgery. He has a history of COPD 

and continues to smoke less than one pack per day. He is relatively comfortable 

in his bed this morning.





Past Medical History


Cardiovascular:  No pertinent hx, HTN, Hyperlipidemia, Other (possible coronary 

artery disease)


Pulmonary:  No pertinent hx, COPD


GI:  No pertinent hx


Hepatobiliary:  No pertinent hx


Psych:  Addictions


Musculoskeletal:  Osteoarthritis


Infectious disease:  Other


Renal/:  No pertinent hx


Endocrine:  No pertinent hx





Family History


Family History:  No Significant, High Cholestrol, Hypertension





Social History


<1 pack per day


ALCOHOL:  rare


Drugs:  None





Current Problem List


Problem List


Problems


Medical Problems:


(1) Cellulitis of right lower leg


Status: Acute  





(2) Right foot ulcer


Status: Acute  











Current Medications


Current Medications





Current Medications


Sodium Chloride 1,000 ml @  1,000 mls/hr 1X  ONCE IV  Last administered on 12/19 /18at 19:19;  Start 12/19/18 at 19:00;  Stop 12/19/18 at 19:59;  Status DC


Vancomycin HCl (Vanco Per Pharmacy) 1 each PRN DAILY  PRN MC SEE COMMENTS Last 

administered on 12/22/18at 12:34;  Start 12/19/18 at 19:45


Vancomycin HCl 2 gm/Sodium Chloride 500 ml @  250 mls/hr ONCE  ONCE IV  Last 

administered on 12/19/18at 20:00;  Start 12/19/18 at 19:45;  Stop 12/19/18 at 21

:44;  Status DC


Ondansetron HCl (Zofran) 4 mg PRN Q8HRS  PRN IV NAUSEA/VOMITING 1ST CHOICE;  

Start 12/19/18 at 21:15;  Stop 12/20/18 at 21:14;  Status DC


Sodium Chloride 1,000 ml @  125 mls/hr Q8H IV  Last administered on 12/20/18at 

20:01;  Start 12/19/18 at 21:03;  Stop 12/20/18 at 21:02;  Status DC


Fentanyl Citrate (Fentanyl 2ml Vial) 25 mcg PRN Q2HR  PRN IV SEVERE PAIN Last 

administered on 12/20/18at 13:59;  Start 12/20/18 at 01:45;  Stop 12/20/18 at 14

:39;  Status DC


Vancomycin HCl 1.25 gm/Sodium Chloride 250 ml @  167 mls/hr Q12H IV  Last 

administered on 12/22/18at 08:41;  Start 12/20/18 at 08:00


Vancomycin HCl (Vancomycin Trough Level) 1 each 1X  ONCE MC ;  Start 12/22/18 

at 07:30;  Stop 12/22/18 at 07:30;  Status DC


Lactobacillus Rhamnosus (Culturelle) 1 cap BID PO  Last administered on 12/22/ 18at 08:41;  Start 12/20/18 at 09:00


Fentanyl Citrate (Fentanyl 2ml Vial) 50 mcg PRN Q2HR  PRN IV SEVERE PAIN Last 

administered on 12/21/18at 09:05;  Start 12/20/18 at 14:45


Apixaban (Eliquis) 5 mg BID PO  Last administered on 12/22/18at 08:41;  Start 12 /20/18 at 15:00


Acetaminophen/ Hydrocodone Bitart (Lortab 7.5/325) 1 tab PRN Q4HRS  PRN PO PAIN 

Last administered on 12/22/18at 08:40;  Start 12/21/18 at 11:15


Info (Anti-Coagulation Monitoring By Pharmacy) 1 each PRN DAILY  PRN MC SEE 

COMMENTS Last administered on 12/22/18at 12:39;  Start 12/21/18 at 11:30


Magnesium Hydroxide (Milk Of Magnesia) 2,400 mg PRN DAILY  PRN PO CONSTIPATION 

Last administered on 12/21/18at 14:28;  Start 12/21/18 at 14:15


Iohexol (Omnipaque 300 Mg/ml) 100 ml 1X  ONCE IV  Last administered on 12/22/ 18at 11:45;  Start 12/22/18 at 11:45;  Stop 12/22/18 at 11:46;  Status DC


Info (CONTRAST GIVEN -- Rx MONITORING) 1 each PRN DAILY  PRN MC SEE COMMENTS;  

Start 12/22/18 at 11:45;  Stop 12/24/18 at 11:44





Active Scripts


Active


Reported


Eliquis (Apixaban) 5 Mg Tablet 5 Mg PO BID





Allergies


Allergies:  


Coded Allergies:  


     No Known Drug Allergies (Unverified , 5/6/17)





ROS


Skin:  Yes Other (right foot pain)





Physical Exam


General:  mild distress


HEENT:  Atraumatic


Lungs:  Clear to auscultation


Heart:  Regular rate


Abdomen:  Normal bowel sounds





Vitals


VITALS





Vital Signs








  Date Time  Temp Pulse Resp B/P (MAP) Pulse Ox O2 Delivery O2 Flow Rate FiO2


 


12/22/18 11:00 98.4 73 21 103/52 (69) 99 Room Air  





 98.4       


 


12/22/18 08:00       2.0 











Labs


Labs





Laboratory Tests








Test


 12/21/18


07:35 12/22/18


05:40


 


Vancomycin Level Trough


 15.6 mcg/mL


(10.0-20.0) 





 


Vancomycin Last Dose Date 12/20/18  


 


Vancomycin Last Dose Time 2000  


 


Creatinine


 


 0.9 mg/dL


(0.7-1.3)


 


Estimated GFR


(Cockcroft-Gault) 


 81.6 











Laboratory Tests








Test


 12/22/18


05:40


 


Creatinine


 0.9 mg/dL


(0.7-1.3)


 


Estimated GFR


(Cockcroft-Gault) 81.6 














Images


Images


Lower extremity CTA as above.





Assessment/Plan


Assessment/Plan


1. Severe peripheral arterial disease. Right foot ulcer as above. CTA showing 

severe bilateral disease. Evaluated by the vascular surgery service with 

probable upcoming surgery. Antibiotics as per ID. From a cardiac viewpoint we 

will check an echocardiogram for LV function. We'll also check a Lexiscan 

nuclear test to exclude underlying coronary ischemia.





2. Hypertension. Patient's blood pressures under better control. Will continue 

to monitor.





3. Uncertain cholesterol level. We'll check a cholesterol panel.





4. History of a PE. CT scan of the chest is pending.





5. COPD. Adjusting medications. Pulmonary consult in progress.





Thank you for allowing us to participate in the care of your patient.











ELLIOTT ROGEL MD Dec 22, 2018 14:11

## 2018-12-22 NOTE — RAD
Indication:COPD/polycythemia vera

 

TECHNIQUE:Portable AP chest X-ray

 

COMPARISON: CT from the same day

 

FINDINGS: Heart is normal in size. Mild bilateral subpleural scarring or 

subsegmental atelectasis seen better seen on CT chest from the same day. 

No focal consolidation. No pneumothorax or pleural effusion. Visualized 

bony thorax is within normal limits.

 

IMPRESSION: No acute pulmonary process. Please see report on CT chest from

the same day.

 

Electronically signed by: Edilberto Wong DO (12/22/2018 12:59 PM) [facility]

## 2018-12-22 NOTE — RAD
PQRS Compliance statement:

 

One or more of the following individualized dose reduction techniques were

utilized for this examination:

1. Automated exposure control.

2. Adjustment of the mA and/or kV according to patient size.

3. Use of iterative reconstruction technique.

 

Indication:SHORTNESS OF BREATH, R/O PE, HX OF DVT<BR>OMNI 300 100ML, PRIOR

SENT

 

TECHNIQUE: CT angiogram of the chest with IV contrast with multiplanar MIP

reformats.

 

COMPARISON:2/25/2018

 

FINDINGS:

Diagnostic quality PE study. There are no central, segmental or 

subsegmental filling defects in the pulmonary arteries. Heart is normal in

size. No pericardial or pleural effusion. Calcified subcarinal and right 

hilar lymph nodes. No enlarged axillary adenopathy. Central airways are 

patent. Subpleural scarring or subsegmental atelectasis is seen in the 

right middle lobe, lingula and right lower lobe.

 

Visualized sections through the liver, spleen, gallbladder, pancreas, 

adrenals and kidneys within normal limits. No suspicious bony lesion.

 

IMPRESSION:

1. No PE.

2. No pneumonia.

 

Electronically signed by: Edilberto Wong DO (12/22/2018 12:48 PM) [facility]

## 2018-12-22 NOTE — PDOC
PROGRESS NOTES


History of Present Illness


History of Present Illness





Assessment/Plan


Assessment/Plan


impression


1. cellulitis right foot and leg


 


2. Mild soft tissue swelling identified dorsal to the metatarsal could be 


edema or soft tissue infection.


 


3. No acute osseous findings.





4. tobacco abuse disorder





5.  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 Collateral flow


reconstitutes the right superficial femoral artery proximally but more


distally the vessel is severely tapered and probably occluded. There is no


significant flow seen in the right popliteal artery. There is two-vessel


runoff to the ankle from reconstituted collateral vessels. in 2017





6. HX PE





7. POLYCYTHEMIA


 


 


 








plan





CELINA-2 assay


PULM CONSULT


HEME CONSULT


iv vanc Q 12 HRS


ID CONSULT


WOUND CARE CONSULT


dvt prophylaxis


blood cult


vascular consult reviewed





Vitals


Vitals





Vital Signs








  Date Time  Temp Pulse Resp B/P (MAP) Pulse Ox O2 Delivery O2 Flow Rate FiO2


 


12/22/18 08:40   18   Room Air  


 


12/22/18 07:00 98.0 86  125/56 (79) 94   





 98.0       











Physical Exam


General:  Alert, Oriented X3, Cooperative, mild distress


Heart:  Regular rate, Normal S1, Normal S2


Lungs:  Clear


Abdomen:  Normal bowel sounds, No tenderness, Other


Extremities:  No cyanosis, Other (ISCHEMIC ULCER RIGHT FOOT/ CELLULITIS)





Labs


LABS





Indication pain.





CTA targeted to the abdominal aorta and the major runoff vessels was


performed. Images were reformatted in the coronal and sagittal planes. Volume


rendered images were also generated and reviewed. Note is made of the abnormal


arterial Doppler study earlier in the day. Approximately 95 cc of Omnipaque


350 was administered. No similar imaging is available.





There is some minimal volume loss, likely reflecting scar, in the right middle


lobe. A definite significant finding and the visualized lung bases is not


seen. There is a periumbilical hernia containing a bowel loop which appears


uncomplicated. There is a right inguinal hernia containing fat also appearing


uncomplicated. The liver and spleen appear unremarkable and the gallbladder


appears grossly normal. No adrenal or renal anomalies are seen. The pancreas


appears unremarkable. Acute finding in the abdomen is not seen. The prostate


is moderately enlarged. No acute finding is seen in the pelvis. There are a


few lymph nodes seen in the groin which are likely incidental. A significant


soft tissue finding in either lower extremity is not seen





There is intimal thickening involving the abdominal aorta. There is no


aneurysm. The celiac and SMA are widely patent at their origins. The LEIGHA is


not seen. There are single main renal arteries bilaterally which are widely


patent.





The right common iliac artery is occluded. Collateral vessels reconstitute the


internal iliac artery. The superficial femoral artery reconstitutes in the


upper thigh there is severe narrowing of the more distal right superficial


femoral artery. There is no significant flow seen in the popliteal artery.


There is two-vessel runoff to the ankle.





On the left there is a web with probable moderate stenosis associated with the


common iliac artery. The internal and external iliac arteries are patent. The


common femoral is patent. There is marked narrowing of the distal superficial


femoral artery. There is no significant flow seen in the popliteal artery.


There is single vessel runoff to the ankle. The posterior tibial artery is


patent to the ankle. The peroneal artery reconstitutes in the mid calf.





IMPRESSION: The right common iliac artery is occluded. Collateral flow


reconstitutes the right superficial femoral artery proximally but more


distally the vessel is severely tapered and probably occluded. There is no


significant flow seen in the right popliteal artery. There is two-vessel


runoff to the ankle from reconstituted collateral vessels.


                        On the left there is some moderate narrowing


associated with the common iliac artery. There is marked narrowing of the


superficial femoral artery distally and no significant flow is seen in the


popliteal artery. There is single vessel runoff to the foot.


                       Ventral wall abdominal hernia and right inguinal


hernia, both of which appear uncomplicated..


                       Enlarged prostate.





PQRS Compliance Statement:


Laboratory Tests








STATUS: ADM IN ORD. PHYSICIAN: ALFONSO SALEH DO 


REASON: foot pain


PROCEDURE: VENOUS LOWER EXTREMITY RIGHT





Examination: VENOUS LOWER EXTREMITY RIGHT


 


History: RT FOOT PAIN, OPEN WOUND RT FOOT, THICK SCALY SKIN RT LOWER 


LEG<BR><BR>NO EVIDENCE OF DVT<BR>REACTIVE LYMPH NODE IN 


GROIN<BR><BR>LIMITED VIS OF CALF VEINS


 


Comparison/Correlation: None


 


Findings: Right lower extremity venous duplex ultrasound exam was 


performed. Visualization of the calf veins is limited. Compression and 


augmentation utilized. Spectral, color Doppler, and grayscale imaging was 


performed. Right common femoral, superficial femoral, popliteal, and 


partially visualized posterior tibial veins are unremarkable. Partially 


visualized right profunda femoris vein is unremarkable.


 


Right groin lymph node is borderline and presumably reactive.


 


 


Impression:


No right lower extremity DVT.


 


Electronically signed by: Sanford Bryant MD (12/19/2018 9:10 PM) [facility]


DOPPLER CAROTID BILAT


 


Clinical Indication: PRE-OP. 


 


Procedure: Pulsed wave and color-flow duplex imaging was utilized to 


evaluate the extracranial carotid arteries.


 


Comparison: None.


 


Findings: 


 


RIGHT SIDE: 


 


Mild atherosclerotic plaque on gray-scale images. 


 


Distal CCA peak systolic velocity 119 cm/sec.


ICA peak systolic velocity 94 cm/sec.


The right ICA/CCA ratio is 0.8. 


 


Flow within the right vertebral artery and right ECA is directed 


antegrade.  Biphasic waveforms are seen in the subclavian artery without 


elevated velocities.


 


LEFT SIDE:


 


Mild atherosclerotic plaque on gray-scale images. 


 


Distal CCA peak systolic velocity 111 cm/sec.


ICA peak systolic velocity 97 cm/sec.


The left ICA/CCA ratio is 0.9. 


Flow within the left vertebral artery and left ECA is directed antegrade.


 


 


Carotid legend:


CCA = common carotid artery


ICA = internal carotid artery


ECA = external carotid artery


 


IMPRESSION: 


 


No hemodynamically significant stenosis.


 


Electronically signed by: Edilberto Wong DO (12/22/2018 8:56 AM) [facility]








Laboratory Tests








Test


 12/22/18


05:40


 


Creatinine


 0.9 mg/dL


(0.7-1.3)


 


Estimated GFR


(Cockcroft-Gault) 81.6 














Assessment and Plan


Assessmemt and Plan


Problems


Medical Problems:


(1) Cellulitis of right lower leg


Status: Acute  





(2) Right foot ulcer


Status: Acute  











Comment


Review of Relevant


I have reviewed the following items karen (where applicable) has been applied.


Labs





Laboratory Tests








Test


 12/21/18


07:35 12/22/18


05:40


 


Vancomycin Level Trough


 15.6 mcg/mL


(10.0-20.0) 





 


Vancomycin Last Dose Date 12/20/18  


 


Vancomycin Last Dose Time 2000  


 


Creatinine


 


 0.9 mg/dL


(0.7-1.3)


 


Estimated GFR


(Cockcroft-Gault) 


 81.6 











Laboratory Tests








Test


 12/22/18


05:40


 


Creatinine


 0.9 mg/dL


(0.7-1.3)


 


Estimated GFR


(Cockcroft-Gault) 81.6 











Microbiology


12/19/18 Blood Culture - Preliminary, Resulted


           NO GROWTH AFTER 2 DAYS


Medications





Current Medications


Sodium Chloride 1,000 ml @  1,000 mls/hr 1X  ONCE IV  Last administered on 12/19 /18at 19:19;  Start 12/19/18 at 19:00;  Stop 12/19/18 at 19:59;  Status DC


Vancomycin HCl (Vanco Per Pharmacy) 1 each PRN DAILY  PRN MC SEE COMMENTS Last 

administered on 12/21/18at 13:03;  Start 12/19/18 at 19:45


Vancomycin HCl 2 gm/Sodium Chloride 500 ml @  250 mls/hr ONCE  ONCE IV  Last 

administered on 12/19/18at 20:00;  Start 12/19/18 at 19:45;  Stop 12/19/18 at 21

:44;  Status DC


Ondansetron HCl (Zofran) 4 mg PRN Q8HRS  PRN IV NAUSEA/VOMITING 1ST CHOICE;  

Start 12/19/18 at 21:15;  Stop 12/20/18 at 21:14;  Status DC


Sodium Chloride 1,000 ml @  125 mls/hr Q8H IV  Last administered on 12/20/18at 

20:01;  Start 12/19/18 at 21:03;  Stop 12/20/18 at 21:02;  Status DC


Fentanyl Citrate (Fentanyl 2ml Vial) 25 mcg PRN Q2HR  PRN IV SEVERE PAIN Last 

administered on 12/20/18at 13:59;  Start 12/20/18 at 01:45;  Stop 12/20/18 at 14

:39;  Status DC


Vancomycin HCl 1.25 gm/Sodium Chloride 250 ml @  167 mls/hr Q12H IV  Last 

administered on 12/22/18at 08:41;  Start 12/20/18 at 08:00


Vancomycin HCl (Vancomycin Trough Level) 1 each 1X  ONCE MC ;  Start 12/22/18 

at 07:30;  Stop 12/22/18 at 07:30;  Status DC


Lactobacillus Rhamnosus (Culturelle) 1 cap BID PO  Last administered on 12/22/ 18at 08:41;  Start 12/20/18 at 09:00


Fentanyl Citrate (Fentanyl 2ml Vial) 50 mcg PRN Q2HR  PRN IV SEVERE PAIN Last 

administered on 12/21/18at 09:05;  Start 12/20/18 at 14:45


Apixaban (Eliquis) 5 mg BID PO  Last administered on 12/22/18at 08:41;  Start 12 /20/18 at 15:00


Acetaminophen/ Hydrocodone Bitart (Lortab 7.5/325) 1 tab PRN Q4HRS  PRN PO PAIN 

Last administered on 12/22/18at 08:40;  Start 12/21/18 at 11:15


Info (Anti-Coagulation Monitoring By Pharmacy) 1 each PRN DAILY  PRN MC SEE 

COMMENTS;  Start 12/21/18 at 11:30


Magnesium Hydroxide (Milk Of Magnesia) 2,400 mg PRN DAILY  PRN PO CONSTIPATION 

Last administered on 12/21/18at 14:28;  Start 12/21/18 at 14:15





Active Scripts


Active


Reported


Eliquis (Apixaban) 5 Mg Tablet 5 Mg PO BID


Vitals/I & O





Vital Sign - Last 24 Hours








 12/21/18 12/21/18 12/21/18 12/21/18





 09:35 11:00 15:00 17:30


 


Temp  98.5 98.8 





  98.5 98.8 


 


Pulse  82 85 


 


Resp  20 20 18


 


B/P (MAP)  120/55 (76) 102/50 (67) 


 


Pulse Ox  92 94 


 


O2 Delivery Room Air Room Air Room Air Room Air


 


    





    





 12/21/18 12/21/18 12/21/18 12/21/18





 18:39 19:00 21:10 22:48


 


Temp  98.6  





  98.6  


 


Pulse  80  


 


Resp  17  20


 


B/P (MAP)  90/45 (60)  


 


Pulse Ox 94 96  


 


O2 Delivery  Room Air Room Air Room Air


 


    





    





 12/21/18 12/21/18 12/22/18 12/22/18





 23:00 23:56 02:46 07:00


 


Temp 98.4   98.0





 98.4   98.0


 


Pulse 73  73 86


 


Resp 17 20 19 20


 


B/P (MAP) 112/59 (76)  102/48 (66) 125/56 (79)


 


Pulse Ox 96  93 94


 


O2 Delivery Room Air Room Air Room Air Room Air


 


    





    





 12/22/18   





 08:40   


 


Resp 18   


 


O2 Delivery Room Air   














Intake and Output   


 


 12/21/18 12/21/18 12/22/18





 15:00 23:00 07:00


 


Intake Total 360 ml 430 ml 


 


Output Total 650 ml 600 ml 400 ml


 


Balance -290 ml -170 ml -400 ml

















SHIRLEY DEMARCO MD Dec 22, 2018 09:27

## 2018-12-22 NOTE — RAD
DOPPLER CAROTID BILAT

 

Clinical Indication: PRE-OP. 

 

Procedure: Pulsed wave and color-flow duplex imaging was utilized to 

evaluate the extracranial carotid arteries.

 

Comparison: None.

 

Findings: 

 

RIGHT SIDE: 

 

Mild atherosclerotic plaque on gray-scale images. 

 

Distal CCA peak systolic velocity 119 cm/sec.

ICA peak systolic velocity 94 cm/sec.

The right ICA/CCA ratio is 0.8. 

 

Flow within the right vertebral artery and right ECA is directed 

antegrade.  Biphasic waveforms are seen in the subclavian artery without 

elevated velocities.

 

LEFT SIDE:

 

Mild atherosclerotic plaque on gray-scale images. 

 

Distal CCA peak systolic velocity 111 cm/sec.

ICA peak systolic velocity 97 cm/sec.

The left ICA/CCA ratio is 0.9. 

Flow within the left vertebral artery and left ECA is directed antegrade.

 

 

Carotid legend:

CCA = common carotid artery

ICA = internal carotid artery

ECA = external carotid artery

 

IMPRESSION: 

 

No hemodynamically significant stenosis.

 

Electronically signed by: Edilberto Wong DO (12/22/2018 8:56 AM) [facility]

## 2018-12-22 NOTE — PDOC
PULMONARY PROGRESS NOTES


Vitals





Vital Signs








  Date Time  Temp Pulse Resp B/P (MAP) Pulse Ox O2 Delivery O2 Flow Rate FiO2


 


12/22/18 09:40   16   Room Air  


 


12/22/18 07:00 98.0 86  125/56 (79) 94   





 98.0       








Lungs:  Clear


Cardiovascular:  S1, S2


Abdomen:  Soft


Extremities:  No Edema


Labs





Laboratory Tests








Test


 12/21/18


07:35 12/22/18


05:40


 


Vancomycin Level Trough


 15.6 mcg/mL


(10.0-20.0) 





 


Vancomycin Last Dose Date 12/20/18  


 


Vancomycin Last Dose Time 2000  


 


Creatinine


 


 0.9 mg/dL


(0.7-1.3)


 


Estimated GFR


(Cockcroft-Gault) 


 81.6 











Laboratory Tests








Test


 12/22/18


05:40


 


Creatinine


 0.9 mg/dL


(0.7-1.3)


 


Estimated GFR


(Cockcroft-Gault) 81.6 











Medications





Active Scripts








 Medications  Dose


 Route/Sig


 Max Daily Dose Days Date Category


 


 Eliquis


  (Apixaban) 5 Mg


 Tablet  5 Mg


 PO BID


   3/1/18 Reported











Impression


.


NOTE DICTATED


H/O PE PT NEVER FOLLOW UP WITH ME


WILL REPEAT CTA OF CHEST


CONTINUE THE SAME FOR NOW


IF SURGERY IS NEEDED RESP STATUS IS COMPENSATED TO UNDERGO SURGERY











TALIA JAMES MD Dec 22, 2018 11:37

## 2018-12-22 NOTE — CONS
DATE OF CONSULTATION:  12/22/2018



REQUESTING PHYSICIAN:  Dr. Aram Carrillo.



REASON FOR CONSULTATION:  Evaluation for polycythemia in a patient needing

surgery for peripheral vascular disease.



HISTORY OF PRESENT ILLNESS:  The patient is a 78-year-old  gentleman

who was noted to have polycythemia at least since 2015.  He has had a hematocrit

around 20 and his platelets have been low at times around 110.  He had an

erythropoietin level done in 2015 that was low at 2.5.  A JAK2 mutation was done

previously, but was negative.  He was evaluated by Dr. Lupe Ulloa on 05/08/2017

at [facility] when he was admitted for hip pain.  Dr. Ulloa

recommended phlebotomy and JAK2 NGS testing, which was not done as it was not

available.  Dr. Ulloa recommended a followup, but he never returned to the

office.  He did receive 1 phlebotomy on 05/08/2017.



I reviewed labs from [facility] including old records.  He has

had elevated hemoglobin of 19.9 on 05/06/2017 and the hematocrit was 58.6 on

05/06/2017 and it was 56.0 on 12/20/2018.



He denies any nosebleeds or gum bleeding.  No hematemesis, melena or

hematochezia.  No hemoptysis or hematuria.



He has a history of pulmonary embolism in 2018.  A CT angiogram on 02/25/2018

revealed suboptimal study.  There was evidence of proximal segmental PE on the

right side, supplying right middle lobe and right lower lobe and distal

segmental PE in the left lower lobe.  Suggestion of pulmonary infarct in the

right middle lobe.  He was started on Eliquis.  The patient was supposed to

follow up with Pulmonary Medicine, but he never kept his appointment.  He had a

followup CT angiogram on 12/22/2018, which was negative for pulmonary embolism

and negative for pneumonia.  I discussed with Dr. Atkinson.



The patient was admitted to [facility] on 12/19/2018 for

management of cellulitis.  His right foot was red and swollen.



The patient was evaluated by Vascular Surgery.  The patient was noted to have

painful ulceration of the right shin and the right foot.  He had a CT angiogram

in 05/2017 that had revealed right common iliac and right external iliac artery

occlusion.  There is also high-grade left common iliac artery stenosis.  The

patient will need aortofemoral bypass graft and possible right femoral popliteal

bypass graft per Dr. Matt Pink.  Dr. Pink recommended a Hematology

consultation for evaluation of polycythemia and to hopefully bring the

hemoglobin down to around 15 before he proceed with surgery.



Hence, I was consulted for further evaluation.



PAST MEDICAL HISTORY:  History of angina, DVT, history of pulmonary embolism in

02/2018, which has resolved per CT angiogram of 12/2018.  He has a history of

polycythemia, but never diagnosed as polycythemia vera and he has had 1

phlebotomy on 05/08/2017 and he declined further phlebotomies and he declined

further followup with Dr. Lupe Ulloa.  He also has a history of COPD,

osteoarthritis.



FAMILY HISTORY:  Positive for hypercholesterolemia.



SOCIAL HISTORY:  He has history of smoking less than 1 pack per day.



REVIEW OF SYSTEMS:  A 12-point review of system was performed.  Pertinent

positives are mentioned in the history of present illness.  Rest of the system

review is negative.



PHYSICAL EXAMINATION:

GENERAL APPEARANCE:  The patient is a 78-year-old  gentleman who is in

no acute cardiorespiratory distress.

VITAL SIGNS:  Blood pressure 125/56, temperature 98 degrees.

HEAD:  Atraumatic, normocephalic.

EYES:  No icterus.

NECK:  Supple.

CHEST:  Bilaterally symmetrical.  No crepitations or rhonchi heard.

HEART:  S1, S2 normal.

ABDOMEN:  Soft, nontender, no hepatosplenomegaly.

CENTRAL NERVOUS SYSTEM:  No focal deficits.

LYMPHATICS:  No lymphadenopathy.

SKIN:  No rashes.

PSYCHOLOGIC:  Mood and affect are appropriate.

MUSCULOSKELETAL:  He has open ulcer on the anterolateral aspect of the right

foot, which extends down through the skin and subcutaneous tissue.  He has

dressing in place.



LABORATORY DATA:  On 12/20/2018, WBC 7.9, hemoglobin 18.7, platelet count 133,

hematocrit 56, creatinine 0.9.



IMPRESSION AND PLAN:

1.  Polycythemia.  He has had elevated hemoglobin and hematocrit since 2015. 

His erythropoietin was low at 2.5 in 2015.  However, the JAK2 mutation was

negative.  I agree to proceed with further testing with next generation

sequencing for JAK2 mutation.  It appears that this was ordered by Dr. Lupe Ulloa in 2017 and I will check with Pathology if this was completed.  In

addition, he also needs a bone marrow biopsy for diagnosis of polycythemia vera

and I discussed with the patient and he agrees to proceed with a biopsy to be

done on 12/24/2018.  The patient does understand that he had a phlebotomy on

05/08/2017, but he did not understand that it is therapeutic phlebotomy and he

felt the old bag of blood was taken for testing.  I educated him that phlebotomy

was a therapeutic procedure.  In addition, he has not been compliant with

followup appointments.  He did not keep his appointment with Dr. Lupe Ulloa and

he did not keep his appointment with Dr. Karla Atkinson.  I have advised him to

be compliant for followup and management.

2.  Pulmonary embolism in 02/2018.  He also has a history of DVT in the past. 

He is on Eliquis and the pulmonary embolism has resolved as of CT angiogram done

on 12/22/2018.  I discussed with Dr. Atkinson and Dr. Atkinson is considering to

discontinue, so I discussed with Dr. Atkinson.

3.  Peripheral arterial disease.  Appreciate Vascular Surgery consultation.  He

will need surgery after the hemoglobin and hematocrit are controlled and after

the workup is completed.

4. Thrombocytopenia.

 



______________________________

LUCIUS GREY MD



DR:  BRIAN/heather  JOB#:  [id number] / [id number]

DD:  12/22/2018 13:46  DT:  12/22/2018 16:20

SARAHI

## 2018-12-22 NOTE — PDOC
Infectious Disease Note


Vital Sign


Vital Signs





Vital Signs








  Date Time  Temp Pulse Resp B/P (MAP) Pulse Ox O2 Delivery O2 Flow Rate FiO2


 


12/22/18 15:00 98.2 92 20 96/51 (66) 94 Room Air  





 98.2       


 


12/22/18 08:00       2.0 











Labs


Lab





Laboratory Tests








Test


 12/22/18


05:40


 


Creatinine


 0.9 mg/dL


(0.7-1.3)


 


Estimated GFR


(Cockcroft-Gault) 81.6 











Micro





Microbiology


12/19/18 Blood Culture - Preliminary, Resulted


           NO GROWTH AFTER 2 DAYS





Objective


Assessment


RLE ulcerations and cellulitis


Arterial occlusive disease, vascular following. May need aorto-fem bypass graft 

and possible right fem-pop-bypass graft 


h/o DVT/PE


Tobacco dependence 


COPD





Plan


Plan of Care


continue vanc and add Zosyn


wound culture in process


Monitor labs/temp/renal function closely


Local wound care





Thank you 


[id number]





Patient seen and examined. Chart reviewed in detail. Case discussed with NP. 

Agree with above plan











CANDIDO KIMBALL Dec 22, 2018 18:10


GIGI COLORADO MD Dec 22, 2018 20:19

## 2018-12-22 NOTE — CONS
DATE OF CONSULTATION:  12/22/2018



ATTENDING PHYSICIAN:  Aram Carrillo MD.



REASON FOR CONSULTATION:  The patient seen in pulmonary consultation at the

request of Dr. Carrillo for history of PE and preoperative evaluation.



HISTORY OF PRESENT ILLNESS:  The patient is a 78-year-old that was last seen by

our service back in February.  At that time, he presented with increasing

shortness of breath, hemoptysis, had a CT chest, which revealed pulmonary

embolism and pneumonia.  The patient was treated for the above.  He was

discharged home.  He has not followed up in the office.  He claims that he does

not have a primary care doctor.



He presented now with a history of peripheral vascular disease with right common

iliac and external iliac artery occlusion.  He has been seen by Vascular Surgery

for the ischemic ulceration of the right foot and right shin.  There is a

possibility of requiring aortofemoral bypass graft and possible right femoral

popliteal bypass graft.  I was asked to see him in consultation for preoperative

evaluation.



The patient is currently on Eliquis.  He denies hemoptysis.  No progressive

dyspnea or tachypnea.  No fever, chills or night sweats.



PAST MEDICAL HISTORY:

1.  COPD, he does not wear oxygen at home.

2.  Peripheral vascular disease as indicated above.

3.  Coronary artery disease.

4.  Hyperlipidemia.

5.  Tobacco dependence.



PAST SURGICAL HISTORY:  Status post previous inguinal hernia repair.



FAMILY HISTORY:  Diabetes.



SOCIAL HISTORY:  He continues to smoke.  Denies any alcohol intake.



REVIEW OF SYSTEMS:

CONSTITUTIONAL:  No fever or chills.

EYES:  No changes in visual acuity.

HENT:  No nasal congestion or sore throat.

PULMONARY:  As indicated above.

CARDIOVASCULAR:  No chest pain or pressure.

GASTROINTESTINAL:  No nausea, vomiting, diarrhea.

GENITOURINARY:  No dysuria or frequency.

MUSCULOSKELETAL:  No localized muscle aches or joint pain.

SKIN:  No new skin rashes.



ALLERGIES:  No known drug allergies.



CURRENT MEDICATIONS:  List was reviewed.



PHYSICAL EXAMINATION:

GENERAL:  The patient was in no respiratory distress.

VITAL SIGNS:  Stable.  O2 saturation currently 94%.

HEENT:  Eyes, the sclerae were nonicteric.

NECK:  Jugular venous distention was not elevated.  No lymphadenopathy.

CHEST:  Full expansion.

LUNGS:  Adequate airway flow with no wheezes.

CARDIOVASCULAR:  Regular rate and rhythm with distant S1, S2, no S3.

ABDOMEN:  Soft, nontender, nondistended.

EXTREMITIES:  No clubbing or cyanosis.  He had dressing on the right foot.

NEUROLOGIC:  The patient was awake, alert, following commands.  A detailed neuro

exam was not performed.



LABORATORY DATA:  Reviewed.  White count was normal.  Hemoglobin and hematocrit

were noted.  Electrolytes were noted.  Right lower extremity venous Doppler was

negative.



IMPRESSION:

1.  History of pulmonary embolism.

2.  Chronic obstructive pulmonary disease.

3.  Tobacco dependence.

4.  Peripheral vascular disease.

5.  Chronic obstructive pulmonary disease.

6.  Ischemic ulceration of the right foot and right shin.



PLAN:

1.  We will repeat CT angiogram, the patient has not followed up in the

outpatient department.  I do not think he needs long-term anticoagulation.

2.  Follow Cardiology input.

3.  Follow Hematology input.

4.  Nebulized treatments.



I do appreciate the privilege in sharing in the patient's care.



If the patient requires surgical intervention, I do not see any absolute

contraindications, from a pulmonary standpoint of view.

 



______________________________

TALIA JAMES MD



DR:  ELDA/heather  JOB#:  [id number] / [id number]

DD:  12/22/2018 11:34  DT:  12/22/2018 12:26

## 2018-12-23 VITALS — DIASTOLIC BLOOD PRESSURE: 52 MMHG | SYSTOLIC BLOOD PRESSURE: 104 MMHG

## 2018-12-23 VITALS — DIASTOLIC BLOOD PRESSURE: 48 MMHG | SYSTOLIC BLOOD PRESSURE: 105 MMHG

## 2018-12-23 VITALS — SYSTOLIC BLOOD PRESSURE: 125 MMHG | DIASTOLIC BLOOD PRESSURE: 47 MMHG

## 2018-12-23 VITALS — SYSTOLIC BLOOD PRESSURE: 100 MMHG | DIASTOLIC BLOOD PRESSURE: 50 MMHG

## 2018-12-23 VITALS — SYSTOLIC BLOOD PRESSURE: 93 MMHG | DIASTOLIC BLOOD PRESSURE: 57 MMHG

## 2018-12-23 VITALS — DIASTOLIC BLOOD PRESSURE: 50 MMHG | SYSTOLIC BLOOD PRESSURE: 100 MMHG

## 2018-12-23 LAB
ALBUMIN SERPL-MCNC: 2.7 G/DL (ref 3.4–5)
ALBUMIN/GLOB SERPL: 0.8 {RATIO} (ref 1–1.7)
ALP SERPL-CCNC: 78 U/L (ref 46–116)
ALT SERPL-CCNC: 17 U/L (ref 16–63)
ANION GAP SERPL CALC-SCNC: 9 MMOL/L (ref 6–14)
AST SERPL-CCNC: 22 U/L (ref 15–37)
BASOPHILS # BLD AUTO: 0 X10^3/UL (ref 0–0.2)
BASOPHILS NFR BLD: 1 % (ref 0–3)
BILIRUB SERPL-MCNC: 1 MG/DL (ref 0.2–1)
BUN SERPL-MCNC: 12 MG/DL (ref 8–26)
BUN/CREAT SERPL: 13 (ref 6–20)
CALCIUM SERPL-MCNC: 8.4 MG/DL (ref 8.5–10.1)
CHLORIDE SERPL-SCNC: 104 MMOL/L (ref 98–107)
CO2 SERPL-SCNC: 27 MMOL/L (ref 21–32)
CREAT SERPL-MCNC: 0.9 MG/DL (ref 0.7–1.3)
EOSINOPHIL NFR BLD: 0.3 X10^3/UL (ref 0–0.7)
EOSINOPHIL NFR BLD: 4 % (ref 0–3)
ERYTHROCYTE [DISTWIDTH] IN BLOOD BY AUTOMATED COUNT: 16.7 % (ref 11.5–14.5)
GFR SERPLBLD BASED ON 1.73 SQ M-ARVRAT: 81.6 ML/MIN
GLOBULIN SER-MCNC: 3.6 G/DL (ref 2.2–3.8)
GLUCOSE SERPL-MCNC: 127 MG/DL (ref 70–99)
HCT VFR BLD CALC: 54.6 % (ref 39–53)
HGB BLD-MCNC: 18.4 G/DL (ref 13–17.5)
LYMPHOCYTES # BLD: 0.9 X10^3/UL (ref 1–4.8)
LYMPHOCYTES NFR BLD AUTO: 14 % (ref 24–48)
MCH RBC QN AUTO: 32 PG (ref 25–35)
MCHC RBC AUTO-ENTMCNC: 34 G/DL (ref 31–37)
MCV RBC AUTO: 95 FL (ref 79–100)
MONO #: 0.7 X10^3/UL (ref 0–1.1)
MONOCYTES NFR BLD: 11 % (ref 0–9)
NEUT #: 4.5 X10^3UL (ref 1.8–7.7)
NEUTROPHILS NFR BLD AUTO: 70 % (ref 31–73)
PLATELET # BLD AUTO: 138 X10^3/UL (ref 140–400)
POTASSIUM SERPL-SCNC: 3.4 MMOL/L (ref 3.5–5.1)
PROT SERPL-MCNC: 6.3 G/DL (ref 6.4–8.2)
RBC # BLD AUTO: 5.74 X10^6/UL (ref 4.3–5.7)
SODIUM SERPL-SCNC: 140 MMOL/L (ref 136–145)
WBC # BLD AUTO: 6.5 X10^3/UL (ref 4–11)

## 2018-12-23 RX ADMIN — VANCOMYCIN HYDROCHLORIDE SCH MLS/HR: 1 INJECTION, POWDER, FOR SOLUTION INTRAVENOUS at 08:14

## 2018-12-23 RX ADMIN — APIXABAN SCH MG: 5 TABLET, FILM COATED ORAL at 09:39

## 2018-12-23 RX ADMIN — PIPERACILLIN SODIUM AND TAZOBACTAM SODIUM SCH MLS/HR: 3; .375 INJECTION, POWDER, LYOPHILIZED, FOR SOLUTION INTRAVENOUS at 23:46

## 2018-12-23 RX ADMIN — VANCOMYCIN HYDROCHLORIDE PRN EACH: 1 INJECTION, POWDER, LYOPHILIZED, FOR SOLUTION INTRAVENOUS at 13:44

## 2018-12-23 RX ADMIN — PIPERACILLIN SODIUM AND TAZOBACTAM SODIUM SCH MLS/HR: 3; .375 INJECTION, POWDER, LYOPHILIZED, FOR SOLUTION INTRAVENOUS at 05:45

## 2018-12-23 RX ADMIN — Medication SCH CAP: at 09:39

## 2018-12-23 RX ADMIN — APIXABAN SCH MG: 5 TABLET, FILM COATED ORAL at 20:05

## 2018-12-23 RX ADMIN — PIPERACILLIN SODIUM AND TAZOBACTAM SODIUM SCH MLS/HR: 3; .375 INJECTION, POWDER, LYOPHILIZED, FOR SOLUTION INTRAVENOUS at 17:46

## 2018-12-23 RX ADMIN — VANCOMYCIN HYDROCHLORIDE SCH MLS/HR: 1 INJECTION, POWDER, FOR SOLUTION INTRAVENOUS at 20:04

## 2018-12-23 RX ADMIN — Medication PRN EACH: at 13:08

## 2018-12-23 RX ADMIN — Medication SCH CAP: at 20:05

## 2018-12-23 RX ADMIN — PIPERACILLIN SODIUM AND TAZOBACTAM SODIUM SCH MLS/HR: 3; .375 INJECTION, POWDER, LYOPHILIZED, FOR SOLUTION INTRAVENOUS at 11:30

## 2018-12-23 NOTE — PDOC
Infectious Disease Note


Subjective


Subjective


Feeling alright this morning


Denies pain/F/C/N/V/D/SOA





ROS


ROS


per HPI





Vital Sign


Vital Signs





Vital Signs








  Date Time  Temp Pulse Resp B/P (MAP) Pulse Ox O2 Delivery O2 Flow Rate FiO2


 


12/23/18 03:00 97.9 70 20 93/57 (69) 91 Room Air  





 97.9       


 


12/22/18 08:00       2.0 











Physical Exam


PHYSICAL EXAM


GENERAL:  Resting quietly 


HEENT:  Normal conjunctivae.  Oral cavity:  Pharynx pink and moist.


NECK:  Supple.


LUNGS:  Clear to auscultation.


HEART:  S1, S2.


ABDOMEN:  Nondistended, soft and nontender with bowel sounds present.


EXTREMITIES:  No gross edema or cyanosis.  Right lower extremity rubor with


wrinkles and dry flaky skin.  He has 2 ulcers located anterolateral aspect of


the shin and dorsal aspect of the right foot with bleeding.  Dorsalis pedis


pulse difficult to palpate.  His toe nails are thick and yellow.


SKIN:  Warm without rash.


NEUROLOGIC:  Alert and oriented x 3.





Labs


Micro





12/19/18 Blood Culture - Preliminary, Resulted


           NO GROWTH AFTER 3 DAYS





Objective


Assessment


RLE ulcerations and cellulitis


Arterial occlusive disease, vascular following. May need aorto-fem bypass graft 

and possible right fem-pop-bypass graft 


h/o DVT/PE


Tobacco dependence 


COPD





Plan


Plan of Care


continue vanc and Zosyn


wound culture in process


Monitor labs/temp/renal function closely


Local wound care





Patient seen and examined. Chart reviewed in detail. Case discussed with NP. 

Agree with above Plan











CANDIDO KIMBALL Dec 23, 2018 08:13


GIGI COLORADO MD Dec 23, 2018 22:16

## 2018-12-23 NOTE — PDOC
PROGRESS NOTES


Subjective


Subjective


Patient seen and examined





Objective


Objective





Vital Signs








  Date Time  Temp Pulse Resp B/P (MAP) Pulse Ox O2 Delivery O2 Flow Rate FiO2


 


12/23/18 15:28 98.2 75 20 100/50 (67) 94 Room Air  





 98.2       


 


12/23/18 08:00       2.0 














Intake and Output 


 


 12/23/18





 07:01


 


Intake Total 320 ml


 


Output Total 1000 ml


 


Balance -680 ml


 


 


 


Intake Oral 320 ml


 


Output Urine Total 1000 ml











Physical Exam


Abdomen:  Normal bowel sounds


Heart:  Regular rate


General:  mild distress


Lungs:  Clear to auscultation





Assessment


Assessment


Problems


Medical Problems:


(1) Cellulitis of right lower leg


Status: Acute  





(2) Right foot ulcer


Status: Acute  





1. Severe peripheral arterial disease. Right foot ulcer as above. CTA showing 

severe bilateral disease. Followed by the vascular surgery service. 

Echocardiogram today. Lexiscan nuclear stress test scheduled today will need to 

be removed tomorrow since the patient was given breakfast. Evaluated by the 

vascular surgery service with probable upcoming surgery. 





2. Hypertension. Patient's blood pressures under better control. Will continue 

to monitor.





3. Uncertain cholesterol level. Cholesterol panel.





4. History of a PE. CT scan of the chest.





5. COPD. Adjusting medications. Pulmonary consult in progress.





Comment


Review of Relevant


I have reviewed the following items karen (where applicable) has been applied.


Labs





Laboratory Tests








Test


 12/22/18


05:40 12/23/18


09:15


 


Creatinine


 0.9 mg/dL


(0.7-1.3) 0.9 mg/dL


(0.7-1.3)


 


Estimated GFR


(Cockcroft-Gault) 81.6 


 81.6 





 


White Blood Count


 


 6.5 x10^3/uL


(4.0-11.0)


 


Red Blood Count


 


 5.74 x10^6/uL


(4.30-5.70)


 


Hemoglobin


 


 18.4 g/dL


(13.0-17.5)


 


Hematocrit


 


 54.6 %


(39.0-53.0)


 


Mean Corpuscular Volume  95 fL () 


 


Mean Corpuscular Hemoglobin  32 pg (25-35) 


 


Mean Corpuscular Hemoglobin


Concent 


 34 g/dL


(31-37)


 


Red Cell Distribution Width


 


 16.7 %


(11.5-14.5)


 


Platelet Count


 


 138 x10^3/uL


(140-400)


 


Neutrophils (%) (Auto)  70 % (31-73) 


 


Lymphocytes (%) (Auto)  14 % (24-48) 


 


Monocytes (%) (Auto)  11 % (0-9) 


 


Eosinophils (%) (Auto)  4 % (0-3) 


 


Basophils (%) (Auto)  1 % (0-3) 


 


Neutrophils # (Auto)


 


 4.5 x10^3uL


(1.8-7.7)


 


Lymphocytes # (Auto)


 


 0.9 x10^3/uL


(1.0-4.8)


 


Monocytes # (Auto)


 


 0.7 x10^3/uL


(0.0-1.1)


 


Eosinophils # (Auto)


 


 0.3 x10^3/uL


(0.0-0.7)


 


Basophils # (Auto)


 


 0.0 x10^3/uL


(0.0-0.2)


 


Sodium Level


 


 140 mmol/L


(136-145)


 


Potassium Level


 


 3.4 mmol/L


(3.5-5.1)


 


Chloride Level


 


 104 mmol/L


()


 


Carbon Dioxide Level


 


 27 mmol/L


(21-32)


 


Anion Gap  9 (6-14) 


 


Blood Urea Nitrogen


 


 12 mg/dL


(8-26)


 


BUN/Creatinine Ratio  13 (6-20) 


 


Glucose Level


 


 127 mg/dL


(70-99)


 


Calcium Level


 


 8.4 mg/dL


(8.5-10.1)


 


Total Bilirubin


 


 1.0 mg/dL


(0.2-1.0)


 


Aspartate Amino Transf


(AST/SGOT) 


 22 U/L (15-37) 





 


Alanine Aminotransferase


(ALT/SGPT) 


 17 U/L (16-63) 





 


Alkaline Phosphatase


 


 78 U/L


()


 


Total Protein


 


 6.3 g/dL


(6.4-8.2)


 


Albumin


 


 2.7 g/dL


(3.4-5.0)


 


Albumin/Globulin Ratio  0.8 (1.0-1.7) 








Laboratory Tests








Test


 12/23/18


09:15


 


White Blood Count


 6.5 x10^3/uL


(4.0-11.0)


 


Red Blood Count


 5.74 x10^6/uL


(4.30-5.70)


 


Hemoglobin


 18.4 g/dL


(13.0-17.5)


 


Hematocrit


 54.6 %


(39.0-53.0)


 


Mean Corpuscular Volume 95 fL () 


 


Mean Corpuscular Hemoglobin 32 pg (25-35) 


 


Mean Corpuscular Hemoglobin


Concent 34 g/dL


(31-37)


 


Red Cell Distribution Width


 16.7 %


(11.5-14.5)


 


Platelet Count


 138 x10^3/uL


(140-400)


 


Neutrophils (%) (Auto) 70 % (31-73) 


 


Lymphocytes (%) (Auto) 14 % (24-48) 


 


Monocytes (%) (Auto) 11 % (0-9) 


 


Eosinophils (%) (Auto) 4 % (0-3) 


 


Basophils (%) (Auto) 1 % (0-3) 


 


Neutrophils # (Auto)


 4.5 x10^3uL


(1.8-7.7)


 


Lymphocytes # (Auto)


 0.9 x10^3/uL


(1.0-4.8)


 


Monocytes # (Auto)


 0.7 x10^3/uL


(0.0-1.1)


 


Eosinophils # (Auto)


 0.3 x10^3/uL


(0.0-0.7)


 


Basophils # (Auto)


 0.0 x10^3/uL


(0.0-0.2)


 


Sodium Level


 140 mmol/L


(136-145)


 


Potassium Level


 3.4 mmol/L


(3.5-5.1)


 


Chloride Level


 104 mmol/L


()


 


Carbon Dioxide Level


 27 mmol/L


(21-32)


 


Anion Gap 9 (6-14) 


 


Blood Urea Nitrogen


 12 mg/dL


(8-26)


 


Creatinine


 0.9 mg/dL


(0.7-1.3)


 


Estimated GFR


(Cockcroft-Gault) 81.6 





 


BUN/Creatinine Ratio 13 (6-20) 


 


Glucose Level


 127 mg/dL


(70-99)


 


Calcium Level


 8.4 mg/dL


(8.5-10.1)


 


Total Bilirubin


 1.0 mg/dL


(0.2-1.0)


 


Aspartate Amino Transf


(AST/SGOT) 22 U/L (15-37) 





 


Alanine Aminotransferase


(ALT/SGPT) 17 U/L (16-63) 





 


Alkaline Phosphatase


 78 U/L


()


 


Total Protein


 6.3 g/dL


(6.4-8.2)


 


Albumin


 2.7 g/dL


(3.4-5.0)


 


Albumin/Globulin Ratio 0.8 (1.0-1.7) 








Microbiology


12/19/18 Blood Culture - Preliminary, Resulted


           NO GROWTH AFTER 3 DAYS


Medications





Current Medications


Sodium Chloride 1,000 ml @  1,000 mls/hr 1X  ONCE IV  Last administered on 12/19 /18at 19:19;  Start 12/19/18 at 19:00;  Stop 12/19/18 at 19:59;  Status DC


Vancomycin HCl (Vanco Per Pharmacy) 1 each PRN DAILY  PRN MC SEE COMMENTS Last 

administered on 12/23/18at 13:44;  Start 12/19/18 at 19:45


Vancomycin HCl 2 gm/Sodium Chloride 500 ml @  250 mls/hr ONCE  ONCE IV  Last 

administered on 12/19/18at 20:00;  Start 12/19/18 at 19:45;  Stop 12/19/18 at 21

:44;  Status DC


Ondansetron HCl (Zofran) 4 mg PRN Q8HRS  PRN IV NAUSEA/VOMITING 1ST CHOICE;  

Start 12/19/18 at 21:15;  Stop 12/20/18 at 21:14;  Status DC


Sodium Chloride 1,000 ml @  125 mls/hr Q8H IV  Last administered on 12/20/18at 

20:01;  Start 12/19/18 at 21:03;  Stop 12/20/18 at 21:02;  Status DC


Fentanyl Citrate (Fentanyl 2ml Vial) 25 mcg PRN Q2HR  PRN IV SEVERE PAIN Last 

administered on 12/20/18at 13:59;  Start 12/20/18 at 01:45;  Stop 12/20/18 at 14

:39;  Status DC


Vancomycin HCl 1.25 gm/Sodium Chloride 250 ml @  167 mls/hr Q12H IV  Last 

administered on 12/23/18at 08:14;  Start 12/20/18 at 08:00


Vancomycin HCl (Vancomycin Trough Level) 1 each 1X  ONCE MC ;  Start 12/22/18 

at 07:30;  Stop 12/22/18 at 07:30;  Status DC


Lactobacillus Rhamnosus (Culturelle) 1 cap BID PO  Last administered on 12/23/ 18at 09:39;  Start 12/20/18 at 09:00


Fentanyl Citrate (Fentanyl 2ml Vial) 50 mcg PRN Q2HR  PRN IV SEVERE PAIN Last 

administered on 12/21/18at 09:05;  Start 12/20/18 at 14:45


Apixaban (Eliquis) 5 mg BID PO  Last administered on 12/23/18at 09:39;  Start 12 /20/18 at 15:00


Acetaminophen/ Hydrocodone Bitart (Lortab 7.5/325) 1 tab PRN Q4HRS  PRN PO PAIN 

Last administered on 12/23/18at 09:39;  Start 12/21/18 at 11:15


Info (Anti-Coagulation Monitoring By Pharmacy) 1 each PRN DAILY  PRN MC SEE 

COMMENTS Last administered on 12/23/18at 13:08;  Start 12/21/18 at 11:30


Magnesium Hydroxide (Milk Of Magnesia) 2,400 mg PRN DAILY  PRN PO CONSTIPATION 

Last administered on 12/22/18at 20:01;  Start 12/21/18 at 14:15


Iohexol (Omnipaque 300 Mg/ml) 100 ml 1X  ONCE IV  Last administered on 12/22/ 18at 11:45;  Start 12/22/18 at 11:45;  Stop 12/22/18 at 11:46;  Status DC


Info (CONTRAST GIVEN -- Rx MONITORING) 1 each PRN DAILY  PRN MC SEE COMMENTS;  

Start 12/22/18 at 11:45;  Stop 12/24/18 at 11:44


Piperacillin Sod/ Tazobactam Sod 3.375 gm/Sodium Chloride 50 ml @  100 mls/hr 

Q6HRS IV  Last administered on 12/23/18at 11:30;  Start 12/22/18 at 18:30


Potassium Chloride (Klor-Con) 40 meq 1X  ONCE PO ;  Start 12/23/18 at 14:30;  

Stop 12/23/18 at 14:31;  Status DC


Potassium Chloride (Klor-Con) 20 meq DAILYWBKFT PO ;  Start 12/24/18 at 08:00


Iohexol (Omnipaque 300 Mg/ml) 95 ml 1X  ONCE IV  Last administered on 12/23/ 18at 15:09;  Start 12/23/18 at 14:45;  Stop 12/23/18 at 14:47;  Status DC


Info (CONTRAST GIVEN -- Rx MONITORING) 1 each PRN DAILY  PRN MC SEE COMMENTS;  

Start 12/23/18 at 15:00;  Stop 12/25/18 at 14:59





Active Scripts


Active


Reported


Eliquis (Apixaban) 5 Mg Tablet 5 Mg PO BID


Vitals/I & O





Vital Sign - Last 24 Hours








 12/22/18 12/22/18 12/22/18 12/22/18





 19:00 20:00 20:30 21:30


 


Temp 96.6   





 96.6   


 


Pulse 80   


 


Resp 20  20 


 


B/P (MAP) 105/54 (71)   


 


Pulse Ox 90  94 91


 


O2 Delivery Room Air Room Air Room Air Room Air


 


    





    





 12/22/18 12/23/18 12/23/18 12/23/18





 23:00 00:43 03:00 07:00


 


Temp 98.1  97.9 97.9





 98.1  97.9 97.9


 


Pulse 75  70 79


 


Resp 20 20 20 20


 


B/P (MAP) 94/56 (69)  93/57 (69) 100/50 (67)


 


Pulse Ox 90 92 91 91


 


O2 Delivery Nasal Cannula Room Air Room Air Room Air


 


    





    





 12/23/18 12/23/18 12/23/18 12/23/18





 08:00 09:39 10:39 11:00


 


Temp    98.1





    98.1


 


Pulse    76


 


Resp  18 16 18


 


B/P (MAP)    104/52 (69)


 


Pulse Ox    94


 


O2 Delivery Room Air Room Air  Nasal Cannula


 


O2 Flow Rate 2.0   


 


    





    





 12/23/18   





 15:28   


 


Temp 98.2   





 98.2   


 


Pulse 75   


 


Resp 20   


 


B/P (MAP) 100/50 (67)   


 


Pulse Ox 94   


 


O2 Delivery Room Air   














Intake and Output   


 


 12/22/18 12/22/18 12/23/18





 15:01 23:01 07:01


 


Intake Total  120 ml 200 ml


 


Output Total  150 ml 850 ml


 


Balance  -30 ml -650 ml

















ELLIOTT ROGEL MD Dec 23, 2018 17:09

## 2018-12-23 NOTE — CARD
MR#: [id number]

Account#: [id number]

Accession#: [id number]

Date of Study: 12/23/2018

Ordering Physician: ELLIOTT CHONG, 

Referring Physician: REGLA WALLACE 

Tech: Larissa Benoit RDCS





--------------- APPROVED REPORT --------------





EXAM: Two-dimensional and M-mode echocardiogram with Doppler and color Doppler.



Other Information 

Quality : Good



INDICATION

Cardiac Disease: CAD 



2D DIMENSIONS 

RVDd2.6 (2.9-3.5cm)Left Atrium(2D)3.9 (1.6-4.0cm)

IVSd1.2 (0.7-1.1cm)Aortic Root(2D)3.2 (2.0-3.7cm)

LVDd5.0 (3.9-5.9cm)LVOT Diameter2.0 (1.8-2.4cm)

PWd1.3 (0.7-1.1cm)LVDs3.3 (2.5-4.0cm)

FS (%) 35.6 %SV78.4 ml

LVEF(%)60.0 (>50%)



Aortic Valve

AoV Peak [place].108.1cm/sAoV VTI17.5cm

AO Peak GR.4.7mmHgLVOT  VTI 13.31cm

AO Mean GR.2mmHgAVA   (VTI)2.40cm2



Mitral Valve

MV E Wzcwzobi29.3cm/sMV DECEL [id number]

MV A Oviidwbz65.0cm/sE/A  Ratio0.7



TDI

Lateral E' P. V7.59cm/sMedial E' P. V9.53cm/s

E/Lateral E'7.4E/Medial E'5.9



Tricuspid Valve

TR P. Weyewsch869bc/sRAP VVFEJCKB1jaZu

TR Peak Gr.15lsXdWMQV17viOj



Pulmonary Vein

S1 Fvntrogz54.0cm/sS2 Rfsgjfpa34.89cm/s

D2 Gsvbelvu45.9cm/s



 LEFT VENTRICLE 

The left ventricle is normal size. There is mild concentric left ventricular hypertrophy. The left ve
ntricular systolic function is normal and the ejection fraction is within normal range. The Ejection 
Fraction is 55-60%. There is normal LV segmental wall motion. Transmitral Doppler flow pattern is Gra
de I-abnormal relaxation pattern.



 RIGHT VENTRICLE 

The right ventricle is normal size. The right ventricular systolic function is normal.



 ATRIA 

The left atrium size is normal. The right atrium size is normal. The interatrial septum is intact wit
h no evidence for an atrial septal defect or patent foramen ovale as noted on 2-D or Doppler imaging.




 AORTIC VALVE 

The aortic valve is calcified but opens well. Doppler and Color Flow revealed no significant aortic r
egurgitation. There is no significant aortic valvular stenosis.



 MITRAL VALVE 

The mitral valve is calcified but opens well. There is no evidence of mitral valve prolapse. There is
 no mitral valve stenosis. Doppler and Color Flow revealed no mitral valve regurgitation noted.



 TRICUSPID VALVE 

The tricuspid valve is normal in structure and function. Doppler and Color Flow revealed mild tricusp
id regurgitation. There is mild pulmonary hypertension. The PA pressure was estimated at 36 mmHg. The
re is no tricuspid valve stenosis.



 PULMONIC VALVE 

The pulmonic valve is not well visualized. Doppler and Color Flow revealed no pulmonic valvular regur
gitation. There is no pulmonic valvular stenosis.



 GREAT VESSELS 

The aortic root is normal in size. The ascending aorta is normal in size. The IVC is normal in size a
nd collapses <50% with inspiration.



 PERICARDIAL EFFUSION 

There is no evidence of significant pericardial effusion.



Critical Notification

Critical Value: No



<Conclusion>

The left ventricle is normal size.

The left ventricular systolic function is normal and the ejection fraction is within normal range.

The Ejection Fraction is 55-60%.

There is mild concentric left ventricular hypertrophy.

There is no significant aortic valvular stenosis.

Doppler and Color Flow revealed no significant aortic regurgitation.

Doppler and Color Flow revealed no mitral valve regurgitation noted.

Doppler and Color Flow revealed mild tricuspid regurgitation.

There is mild pulmonary hypertension.

The PA pressure was estimated at 36 mmHg.



Signed by : Elliott Chong MD

Electronically Approved : 12/23/2018 18:55:36

## 2018-12-23 NOTE — PDOC
PROGRESS NOTES


Subjective


Subjective


HPI - f/u of Polycythemia





ROS - no CP





Objective


Objective





Vital Signs








  Date Time  Temp Pulse Resp B/P (MAP) Pulse Ox O2 Delivery O2 Flow Rate FiO2


 


12/23/18 10:39   16     


 


12/23/18 09:39      Room Air  


 


12/23/18 08:00       2.0 


 


12/23/18 07:00 97.9 79  100/50 (67) 91   





 97.9       














Intake and Output 


 


 12/23/18





 07:01


 


Intake Total 320 ml


 


Output Total 1000 ml


 


Balance -680 ml


 


 


 


Intake Oral 320 ml


 


Output Urine Total 1000 ml











Physical Exam


Heart:  Regular rate, Normal S1, Normal S2


General:  Alert, Oriented X3


Lungs:  Clear to auscultation


Neuro:  Normal speech


Psych/Mental Status:  Mental status NL





Assessment


Assessment


Problems


Medical Problems:


(1) Cellulitis of right lower leg


Status: Acute  





(2) Right foot ulcer


Status: Acute  





IMPRESSION AND PLAN:





1.  Polycythemia.  He has had elevated hemoglobin and hematocrit since 2015. 


His erythropoietin was low at 2.5 in 2015.  However, the JAK2 mutation was


negative.  I agree to proceed with further testing with next generation


sequencing for JAK2 mutation.  It appears that this was ordered by Dr. Lupe Ulloa in 2017 and I will check with Pathology if this was completed.  In


addition, he also needs a bone marrow biopsy for diagnosis of polycythemia vera


and I discussed with the patient and he agrees to proceed with a biopsy to be


done on 12/24/2018.  The patient does understand that he had a phlebotomy on


05/08/2017, but he did not understand that it is therapeutic phlebotomy and he


felt the old bag of blood was taken for testing.  I educated him that phlebotomy


was a therapeutic procedure.  In addition, he has not been compliant with


followup appointments.  He did not keep his appointment with Dr. Lupe Ulloa and


he did not keep his appointment with Dr. Karla Atkinson.  I have advised him to


be compliant for followup and management.


Hb 18.4 on 12/23/18.





2.  Pulmonary embolism in 02/2018.  He also has a history of DVT in the past. 


He is on Eliquis and the pulmonary embolism has resolved as of CT angiogram done


on 12/22/2018.  I discussed with Dr. Atkinson and Dr. Atkinson is considering to


discontinue. No h/o DVT, he reports blood clot in rt leg in 2004, but did not 

receive anticoagulation per pt


and hence unlikely that this was DVT.





3.  Peripheral arterial disease.  Appreciate Vascular Surgery consultation.  He


will need surgery after the hemoglobin and hematocrit are controlled and after


the workup is completed.





4. Thrombocytopenia, monitor cbc








Comment


Review of Relevant


I have reviewed the following items karen (where applicable) has been applied.


Labs





Laboratory Tests








Test


 12/22/18


05:40 12/23/18


09:15


 


Creatinine


 0.9 mg/dL


(0.7-1.3) 





 


Estimated GFR


(Cockcroft-Gault) 81.6 


 





 


White Blood Count


 


 6.5 x10^3/uL


(4.0-11.0)


 


Red Blood Count


 


 5.74 x10^6/uL


(4.30-5.70)


 


Hemoglobin


 


 18.4 g/dL


(13.0-17.5)


 


Hematocrit


 


 54.6 %


(39.0-53.0)


 


Mean Corpuscular Volume  95 fL () 


 


Mean Corpuscular Hemoglobin  32 pg (25-35) 


 


Mean Corpuscular Hemoglobin


Concent 


 34 g/dL


(31-37)


 


Red Cell Distribution Width


 


 16.7 %


(11.5-14.5)


 


Platelet Count


 


 138 x10^3/uL


(140-400)


 


Neutrophils (%) (Auto)  70 % (31-73) 


 


Lymphocytes (%) (Auto)  14 % (24-48) 


 


Monocytes (%) (Auto)  11 % (0-9) 


 


Eosinophils (%) (Auto)  4 % (0-3) 


 


Basophils (%) (Auto)  1 % (0-3) 


 


Neutrophils # (Auto)


 


 4.5 x10^3uL


(1.8-7.7)


 


Lymphocytes # (Auto)


 


 0.9 x10^3/uL


(1.0-4.8)


 


Monocytes # (Auto)


 


 0.7 x10^3/uL


(0.0-1.1)


 


Eosinophils # (Auto)


 


 0.3 x10^3/uL


(0.0-0.7)


 


Basophils # (Auto)


 


 0.0 x10^3/uL


(0.0-0.2)








Laboratory Tests








Test


 12/23/18


09:15


 


White Blood Count


 6.5 x10^3/uL


(4.0-11.0)


 


Red Blood Count


 5.74 x10^6/uL


(4.30-5.70)


 


Hemoglobin


 18.4 g/dL


(13.0-17.5)


 


Hematocrit


 54.6 %


(39.0-53.0)


 


Mean Corpuscular Volume 95 fL () 


 


Mean Corpuscular Hemoglobin 32 pg (25-35) 


 


Mean Corpuscular Hemoglobin


Concent 34 g/dL


(31-37)


 


Red Cell Distribution Width


 16.7 %


(11.5-14.5)


 


Platelet Count


 138 x10^3/uL


(140-400)


 


Neutrophils (%) (Auto) 70 % (31-73) 


 


Lymphocytes (%) (Auto) 14 % (24-48) 


 


Monocytes (%) (Auto) 11 % (0-9) 


 


Eosinophils (%) (Auto) 4 % (0-3) 


 


Basophils (%) (Auto) 1 % (0-3) 


 


Neutrophils # (Auto)


 4.5 x10^3uL


(1.8-7.7)


 


Lymphocytes # (Auto)


 0.9 x10^3/uL


(1.0-4.8)


 


Monocytes # (Auto)


 0.7 x10^3/uL


(0.0-1.1)


 


Eosinophils # (Auto)


 0.3 x10^3/uL


(0.0-0.7)


 


Basophils # (Auto)


 0.0 x10^3/uL


(0.0-0.2)








Microbiology


12/19/18 Blood Culture - Preliminary, Resulted


           NO GROWTH AFTER 3 DAYS


Medications





Current Medications


Sodium Chloride 1,000 ml @  1,000 mls/hr 1X  ONCE IV  Last administered on 12/19 /18at 19:19;  Start 12/19/18 at 19:00;  Stop 12/19/18 at 19:59;  Status DC


Vancomycin HCl (Vanco Per Pharmacy) 1 each PRN DAILY  PRN MC SEE COMMENTS Last 

administered on 12/22/18at 12:34;  Start 12/19/18 at 19:45


Vancomycin HCl 2 gm/Sodium Chloride 500 ml @  250 mls/hr ONCE  ONCE IV  Last 

administered on 12/19/18at 20:00;  Start 12/19/18 at 19:45;  Stop 12/19/18 at 21

:44;  Status DC


Ondansetron HCl (Zofran) 4 mg PRN Q8HRS  PRN IV NAUSEA/VOMITING 1ST CHOICE;  

Start 12/19/18 at 21:15;  Stop 12/20/18 at 21:14;  Status DC


Sodium Chloride 1,000 ml @  125 mls/hr Q8H IV  Last administered on 12/20/18at 

20:01;  Start 12/19/18 at 21:03;  Stop 12/20/18 at 21:02;  Status DC


Fentanyl Citrate (Fentanyl 2ml Vial) 25 mcg PRN Q2HR  PRN IV SEVERE PAIN Last 

administered on 12/20/18at 13:59;  Start 12/20/18 at 01:45;  Stop 12/20/18 at 14

:39;  Status DC


Vancomycin HCl 1.25 gm/Sodium Chloride 250 ml @  167 mls/hr Q12H IV  Last 

administered on 12/23/18at 08:14;  Start 12/20/18 at 08:00


Vancomycin HCl (Vancomycin Trough Level) 1 each 1X  ONCE MC ;  Start 12/22/18 

at 07:30;  Stop 12/22/18 at 07:30;  Status DC


Lactobacillus Rhamnosus (Culturelle) 1 cap BID PO  Last administered on 12/23/ 18at 09:39;  Start 12/20/18 at 09:00


Fentanyl Citrate (Fentanyl 2ml Vial) 50 mcg PRN Q2HR  PRN IV SEVERE PAIN Last 

administered on 12/21/18at 09:05;  Start 12/20/18 at 14:45


Apixaban (Eliquis) 5 mg BID PO  Last administered on 12/23/18at 09:39;  Start 12 /20/18 at 15:00


Acetaminophen/ Hydrocodone Bitart (Lortab 7.5/325) 1 tab PRN Q4HRS  PRN PO PAIN 

Last administered on 12/23/18at 09:39;  Start 12/21/18 at 11:15


Info (Anti-Coagulation Monitoring By Pharmacy) 1 each PRN DAILY  PRN MC SEE 

COMMENTS Last administered on 12/22/18at 12:39;  Start 12/21/18 at 11:30


Magnesium Hydroxide (Milk Of Magnesia) 2,400 mg PRN DAILY  PRN PO CONSTIPATION 

Last administered on 12/22/18at 20:01;  Start 12/21/18 at 14:15


Iohexol (Omnipaque 300 Mg/ml) 100 ml 1X  ONCE IV  Last administered on 12/22/ 18at 11:45;  Start 12/22/18 at 11:45;  Stop 12/22/18 at 11:46;  Status DC


Info (CONTRAST GIVEN -- Rx MONITORING) 1 each PRN DAILY  PRN MC SEE COMMENTS;  

Start 12/22/18 at 11:45;  Stop 12/24/18 at 11:44


Piperacillin Sod/ Tazobactam Sod 3.375 gm/Sodium Chloride 50 ml @  100 mls/hr 

Q6HRS IV  Last administered on 12/23/18at 11:30;  Start 12/22/18 at 18:30





Active Scripts


Active


Reported


Eliquis (Apixaban) 5 Mg Tablet 5 Mg PO BID


Vitals/I & O





Vital Sign - Last 24 Hours








 12/22/18 12/22/18 12/22/18 12/22/18





 15:00 19:00 20:00 20:30


 


Temp 98.2 96.6  





 98.2 96.6  


 


Pulse 92 80  


 


Resp 20 20  20


 


B/P (MAP) 96/51 (66) 105/54 (71)  


 


Pulse Ox 94 90  94


 


O2 Delivery Room Air Room Air Room Air Room Air


 


    





    





 12/22/18 12/22/18 12/23/18 12/23/18





 21:30 23:00 00:43 03:00


 


Temp  98.1  97.9





  98.1  97.9


 


Pulse  75  70


 


Resp  20 20 20


 


B/P (MAP)  94/56 (69)  93/57 (69)


 


Pulse Ox 91 90 92 91


 


O2 Delivery Room Air Nasal Cannula Room Air Room Air


 


    





    





 12/23/18 12/23/18 12/23/18 12/23/18





 07:00 08:00 09:39 10:39


 


Temp 97.9   





 97.9   


 


Pulse 79   


 


Resp 20  18 16


 


B/P (MAP) 100/50 (67)   


 


Pulse Ox 91   


 


O2 Delivery Room Air Room Air Room Air 


 


O2 Flow Rate  2.0  














Intake and Output   


 


 12/22/18 12/22/18 12/23/18





 15:01 23:01 07:01


 


Intake Total  120 ml 200 ml


 


Output Total  150 ml 850 ml


 


Balance  -30 ml -650 ml

















LUCIUS GREY MD Dec 23, 2018 12:25

## 2018-12-23 NOTE — PDOC
PULMONARY PROGRESS NOTES


Subjective


PT NOT MORE SOA


Vitals





Vital Signs








  Date Time  Temp Pulse Resp B/P (MAP) Pulse Ox O2 Delivery O2 Flow Rate FiO2


 


12/23/18 09:39   18   Room Air  


 


12/23/18 07:00 97.9 79  100/50 (67) 91   





 97.9       


 


12/22/18 08:00       2.0 








ROS:  No Nausea, No Chest Pain, No Abdominal Pain, No Increase Cough


Lungs:  Clear


Cardiovascular:  S1, S2


Abdomen:  Soft


Neuro Exam:  Alert


Extremities:  No Edema


Skin:  Warm


Labs





Laboratory Tests








Test


 12/22/18


05:40


 


Creatinine


 0.9 mg/dL


(0.7-1.3)


 


Estimated GFR


(Cockcroft-Gault) 81.6 











Medications





Active Scripts








 Medications  Dose


 Route/Sig


 Max Daily Dose Days Date Category


 


 Eliquis


  (Apixaban) 5 Mg


 Tablet  5 Mg


 PO BID


   3/1/18 Reported











Impression


.


IMPRESSION:


1.  History of pulmonary embolism.


2.  Chronic obstructive pulmonary disease.


3.  Tobacco dependence.


4.  Peripheral vascular disease.


5.  Chronic obstructive pulmonary disease.


6.  Ischemic ulceration of the right foot and right shin.














CT CHEST


IMPRESSION:


1. No PE.


2. No pneumonia.





Plan


.


RESP STATUS IS COMPENSATED


D/W DR GREY YESTERDAY POSSIBLE BONE MARROW


NO PE NO NEED FOR CONTINUED ANTICOAGULATION


IF SURGERY IS NEEDED OK BY TALIA STANTON MD Dec 23, 2018 10:55

## 2018-12-23 NOTE — PDOC
PROGRESS NOTES


History of Present Illness


History of Present Illness





Assessment/Plan


Assessment/Plan


impression


1. cellulitis right foot and leg


 


2. Mild soft tissue swelling identified dorsal to the metatarsal could be 


edema or soft tissue infection.


 


3. No acute osseous findings.





4. tobacco abuse disorder





5.  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 Collateral flow


reconstitutes the right superficial femoral artery proximally but more


distally the vessel is severely tapered and probably occluded. There is no


significant flow seen in the right popliteal artery. There is two-vessel


runoff to the ankle from reconstituted collateral vessels. in 2017





6. HX PE





7. POLYCYTHEMIA





8. HYPOKALEMIA


 


 


 








plan


REPLACE K


repeat CT angiogram to more clearly delineate arterial anatomy prior to 

scheduling


CELINA-2 assay NEG 2017


PULM CONSULT


HEME CONSULT REVIEWED


iv vanc Q 12 HRS


ID CONSULT


WOUND CARE CONSULT


dvt prophylaxis


blood cult


vascular consult reviewed





Vitals


Vitals





Vital Signs








  Date Time  Temp Pulse Resp B/P (MAP) Pulse Ox O2 Delivery O2 Flow Rate FiO2


 


12/23/18 09:39   18   Room Air  


 


12/23/18 07:00 97.9 79  100/50 (67) 91   





 97.9       


 


12/22/18 08:00       2.0 











Physical Exam


Physical Exam


GENERAL:  Resting quietly 


HEENT:  Normal conjunctivae.  Oral cavity:  Pharynx pink and moist.


NECK:  Supple.


LUNGS:  Clear to auscultation.


HEART:  S1, S2.


ABDOMEN:  Nondistended, soft and nontender with bowel sounds present.


EXTREMITIES:  No gross edema or cyanosis.  Right lower extremity rubor with


wrinkles and dry flaky skin.  He has 2 ulcers located anterolateral aspect of


the shin and dorsal aspect of the right foot with bleeding.  Dorsalis pedis


pulse difficult to palpate.  His toe nails are thick and yellow.


SKIN:  Warm without rash.


NEUROLOGIC:  Alert and oriented x 3.


General:  Alert, Oriented X3, Cooperative, mild distress


Heart:  Regular rate, Normal S1


Lungs:  Clear


Abdomen:  Normal bowel sounds


Extremities:  No cyanosis, Other (ISCHEMIC ULCER RIGHT FOOT/ CELLULITIS)





Assessment and Plan


Assessmemt and Plan


Problems


Medical Problems:


(1) Cellulitis of right lower leg


Status: Acute  





(2) Right foot ulcer


Status: Acute  











Comment


Review of Relevant


I have reviewed the following items karen (where applicable) has been applied.


Labs





Laboratory Tests








Test


 12/22/18


05:40


 


Creatinine


 0.9 mg/dL


(0.7-1.3)


 


Estimated GFR


(Cockcroft-Gault) 81.6 











Microbiology


12/19/18 Blood Culture - Preliminary, Resulted


           NO GROWTH AFTER 3 DAYS


Medications





Current Medications


Sodium Chloride 1,000 ml @  1,000 mls/hr 1X  ONCE IV  Last administered on 12/19 /18at 19:19;  Start 12/19/18 at 19:00;  Stop 12/19/18 at 19:59;  Status DC


Vancomycin HCl (Vanco Per Pharmacy) 1 each PRN DAILY  PRN MC SEE COMMENTS Last 

administered on 12/22/18at 12:34;  Start 12/19/18 at 19:45


Vancomycin HCl 2 gm/Sodium Chloride 500 ml @  250 mls/hr ONCE  ONCE IV  Last 

administered on 12/19/18at 20:00;  Start 12/19/18 at 19:45;  Stop 12/19/18 at 21

:44;  Status DC


Ondansetron HCl (Zofran) 4 mg PRN Q8HRS  PRN IV NAUSEA/VOMITING 1ST CHOICE;  

Start 12/19/18 at 21:15;  Stop 12/20/18 at 21:14;  Status DC


Sodium Chloride 1,000 ml @  125 mls/hr Q8H IV  Last administered on 12/20/18at 

20:01;  Start 12/19/18 at 21:03;  Stop 12/20/18 at 21:02;  Status DC


Fentanyl Citrate (Fentanyl 2ml Vial) 25 mcg PRN Q2HR  PRN IV SEVERE PAIN Last 

administered on 12/20/18at 13:59;  Start 12/20/18 at 01:45;  Stop 12/20/18 at 14

:39;  Status DC


Vancomycin HCl 1.25 gm/Sodium Chloride 250 ml @  167 mls/hr Q12H IV  Last 

administered on 12/23/18at 08:14;  Start 12/20/18 at 08:00


Vancomycin HCl (Vancomycin Trough Level) 1 each 1X  ONCE MC ;  Start 12/22/18 

at 07:30;  Stop 12/22/18 at 07:30;  Status DC


Lactobacillus Rhamnosus (Culturelle) 1 cap BID PO  Last administered on 12/23/ 18at 09:39;  Start 12/20/18 at 09:00


Fentanyl Citrate (Fentanyl 2ml Vial) 50 mcg PRN Q2HR  PRN IV SEVERE PAIN Last 

administered on 12/21/18at 09:05;  Start 12/20/18 at 14:45


Apixaban (Eliquis) 5 mg BID PO  Last administered on 12/23/18at 09:39;  Start 12 /20/18 at 15:00


Acetaminophen/ Hydrocodone Bitart (Lortab 7.5/325) 1 tab PRN Q4HRS  PRN PO PAIN 

Last administered on 12/23/18at 09:39;  Start 12/21/18 at 11:15


Info (Anti-Coagulation Monitoring By Pharmacy) 1 each PRN DAILY  PRN MC SEE 

COMMENTS Last administered on 12/22/18at 12:39;  Start 12/21/18 at 11:30


Magnesium Hydroxide (Milk Of Magnesia) 2,400 mg PRN DAILY  PRN PO CONSTIPATION 

Last administered on 12/22/18at 20:01;  Start 12/21/18 at 14:15


Iohexol (Omnipaque 300 Mg/ml) 100 ml 1X  ONCE IV  Last administered on 12/22/ 18at 11:45;  Start 12/22/18 at 11:45;  Stop 12/22/18 at 11:46;  Status DC


Info (CONTRAST GIVEN -- Rx MONITORING) 1 each PRN DAILY  PRN MC SEE COMMENTS;  

Start 12/22/18 at 11:45;  Stop 12/24/18 at 11:44


Piperacillin Sod/ Tazobactam Sod 3.375 gm/Sodium Chloride 50 ml @  100 mls/hr 

Q6HRS IV  Last administered on 12/23/18at 05:45;  Start 12/22/18 at 18:30





Active Scripts


Active


Reported


Eliquis (Apixaban) 5 Mg Tablet 5 Mg PO BID


Vitals/I & O





Vital Sign - Last 24 Hours








 12/22/18 12/22/18 12/22/18 12/22/18





 15:00 19:00 20:00 20:30


 


Temp 98.2 96.6  





 98.2 96.6  


 


Pulse 92 80  


 


Resp 20 20  20


 


B/P (MAP) 96/51 (66) 105/54 (71)  


 


Pulse Ox 94 90  94


 


O2 Delivery Room Air Room Air Room Air Room Air


 


    





    





 12/22/18 12/22/18 12/23/18 12/23/18





 21:30 23:00 00:43 03:00


 


Temp  98.1  97.9





  98.1  97.9


 


Pulse  75  70


 


Resp 18 20 20 20


 


B/P (MAP)  94/56 (69)  93/57 (69)


 


Pulse Ox 91 90 92 91


 


O2 Delivery Room Air Nasal Cannula Room Air Room Air


 


    





    





 12/23/18 12/23/18  





 07:00 09:39  


 


Temp 97.9   





 97.9   


 


Pulse 79   


 


Resp 20 18  


 


B/P (MAP) 100/50 (67)   


 


Pulse Ox 91   


 


O2 Delivery Room Air Room Air  














Intake and Output   


 


 12/22/18 12/22/18 12/23/18





 15:01 23:01 07:01


 


Intake Total  120 ml 200 ml


 


Output Total  150 ml 850 ml


 


Balance  -30 ml -650 ml

















SHIRLEY DEMARCO MD Dec 23, 2018 11:03

## 2018-12-23 NOTE — PDOC
Provider Note


Provider Note


Vascular F/U





Consultation from cardiology, hematology and infectious disease  appreciated.





We'll obtain a repeat CT angiogram to more clearly delineate arterial anatomy 

prior to scheduling this patient for aortofemoral reconstruction











PEARL GRAF MD Dec 23, 2018 11:41

## 2018-12-23 NOTE — RAD
PQRS Compliance statement:

 

One or more of the following individualized dose reduction techniques were

utilized for this examination:

1. Automated exposure control.

2. Adjustment of the mA and/or kV according to patient size.

3. Use of iterative reconstruction technique.

 

Indication:ulcer to left foot

 

TECHNIQUE: CT angiogram of the abdomen, pelvis and bilateral extrarenal of

with IV contrast with multiplanar MIP reformats.

 

COMPARISON: 5/6/2017

 

FINDINGS:

Heart is normal in size. No pericardial or pleural effusion. Mild diffuse 

atherosclerotic disease seen of the suprarenal aorta. The celiac axis, 

splenic artery, left gastric artery, common hepatic artery, SMA, bilateral

renal arteries are patent. Moderate atherosclerotic disease is seen of the

infrarenal aorta with circumferential soft plaque and mild narrowing of 

the aortic lumen. Stable complete occlusion of the right common iliac 

artery, external iliac artery. The right internal iliac artery supplied by

lumbar collaterals. Mild narrowing seen in the proximal left common iliac 

artery which demonstrates circumferential soft plaque. There is complete 

occlusion of the left external iliac artery which is new from previous 

exam. The left internal iliac arteries patent. The bilateral common 

femoral arteries are occluded. The right superficial femoral artery and 

popliteal arteries are occluded. The right profunda femoris artery is 

patent. The right anterior tibial artery is nonvisualized which may be 

secondary to diffuse high-grade stenosis or occlusion. The right peroneal 

artery is patent up to the level of hindfoot. The right posterior tibial 

artery is patent up to the level of plantar branches. The peroneal and 

posterior tibial arteries are supplied by collaterals from genicular  

arteries.

 

The left superficial femoral and popliteal artery is occluded. The left 

profunda femoris artery is patent and is supplied by gluteal collaterals. 

The anterior tibial artery is nonopacified likely secondary to diffuse 

high-grade stenosis or occlusion. The left peroneal arteries are 

visualized. The posterior tibial artery is patent up to the level of 

plantar branches.

 

Mild bibasilar subsegmental atelectasis or scarring seen in the subpleural

region.

 

The arterial phase appearance of the liver, spleen, gallbladder, pancreas,

adrenals and kidneys is within normal limits. No enlarged retroperitoneal 

or pelvic adenopathy. No free pelvic fluid or ascites. Small bilateral 

fat-containing inguinal hernias. Prostate is enlarged measuring 5.8 x 4.8 

cm. Urinary bladder within normal limits. No bowel obstruction. No 

enlarged retroperitoneal or pelvic adenopathy. No pneumoperitoneum. Small 

omental fat-containing medical hernia. Enlarged right groin lymph node 

measuring 2.7 x 1.0 cm and left groin lymph node measuring 1.8 x 1.1 cm. 

No suspicious bony lesion.

 

IMPRESSION:

1. 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 

described above.

2. New complete occlusion of the left iliofemoral arterial system as 

described above. 

3. Nonvisualization of the bilateral anterior tibial arteries be secondary

to a diffuse high-grade stenosis or complete occlusion. Please see above 

for details.

4. Enlarged prostate. Correlate with physical exam and PSA. 

5. Moderate atherosclerotic disease of the infrarenal aorta with 

circumferential soft plaque.

6. Mildly enlarged bilateral inguinal lymph nodes, nonspecific most likely

reactive.

 

Critical findings were identified on 12/23/2018 3:39 PM, read back and 

verified with Nurse Catina on 12/23/2018 3:58 PM by Dr. Edilberto Wong DO.

 

Electronically signed by: Edilberto Wong DO (12/23/2018 3:58 PM) [facility]

## 2018-12-24 VITALS — SYSTOLIC BLOOD PRESSURE: 112 MMHG | DIASTOLIC BLOOD PRESSURE: 64 MMHG

## 2018-12-24 VITALS — SYSTOLIC BLOOD PRESSURE: 114 MMHG | DIASTOLIC BLOOD PRESSURE: 61 MMHG

## 2018-12-24 VITALS — SYSTOLIC BLOOD PRESSURE: 120 MMHG | DIASTOLIC BLOOD PRESSURE: 94 MMHG

## 2018-12-24 VITALS — DIASTOLIC BLOOD PRESSURE: 51 MMHG | SYSTOLIC BLOOD PRESSURE: 99 MMHG

## 2018-12-24 VITALS — SYSTOLIC BLOOD PRESSURE: 102 MMHG | DIASTOLIC BLOOD PRESSURE: 52 MMHG

## 2018-12-24 VITALS — DIASTOLIC BLOOD PRESSURE: 66 MMHG | SYSTOLIC BLOOD PRESSURE: 113 MMHG

## 2018-12-24 VITALS — SYSTOLIC BLOOD PRESSURE: 113 MMHG | DIASTOLIC BLOOD PRESSURE: 70 MMHG

## 2018-12-24 VITALS — SYSTOLIC BLOOD PRESSURE: 101 MMHG | DIASTOLIC BLOOD PRESSURE: 59 MMHG

## 2018-12-24 VITALS — SYSTOLIC BLOOD PRESSURE: 123 MMHG | DIASTOLIC BLOOD PRESSURE: 73 MMHG

## 2018-12-24 LAB
ANION GAP SERPL CALC-SCNC: 9 MMOL/L (ref 6–14)
BASOPHILS # BLD AUTO: 0 X10^3/UL (ref 0–0.2)
BASOPHILS NFR BLD: 1 % (ref 0–3)
BUN SERPL-MCNC: 10 MG/DL (ref 8–26)
CALCIUM SERPL-MCNC: 8.5 MG/DL (ref 8.5–10.1)
CHLORIDE SERPL-SCNC: 106 MMOL/L (ref 98–107)
CO2 SERPL-SCNC: 26 MMOL/L (ref 21–32)
CREAT SERPL-MCNC: 1 MG/DL (ref 0.7–1.3)
EOSINOPHIL NFR BLD: 0.3 X10^3/UL (ref 0–0.7)
EOSINOPHIL NFR BLD: 5 % (ref 0–3)
ERYTHROCYTE [DISTWIDTH] IN BLOOD BY AUTOMATED COUNT: 16.3 % (ref 11.5–14.5)
GFR SERPLBLD BASED ON 1.73 SQ M-ARVRAT: 72.3 ML/MIN
GLUCOSE SERPL-MCNC: 80 MG/DL (ref 70–99)
HCT VFR BLD CALC: 53.1 % (ref 39–53)
HGB BLD-MCNC: 18 G/DL (ref 13–17.5)
LYMPHOCYTES # BLD: 1.1 X10^3/UL (ref 1–4.8)
LYMPHOCYTES NFR BLD AUTO: 18 % (ref 24–48)
MCH RBC QN AUTO: 32 PG (ref 25–35)
MCHC RBC AUTO-ENTMCNC: 34 G/DL (ref 31–37)
MCV RBC AUTO: 95 FL (ref 79–100)
MONO #: 0.8 X10^3/UL (ref 0–1.1)
MONOCYTES NFR BLD: 13 % (ref 0–9)
NEUT #: 3.8 X10^3UL (ref 1.8–7.7)
NEUTROPHILS NFR BLD AUTO: 63 % (ref 31–73)
PLATELET # BLD AUTO: 145 X10^3/UL (ref 140–400)
POTASSIUM SERPL-SCNC: 4.1 MMOL/L (ref 3.5–5.1)
PROTHROMBIN TIME: 15 SEC (ref 11.7–14)
RBC # BLD AUTO: 5.59 X10^6/UL (ref 4.3–5.7)
SODIUM SERPL-SCNC: 141 MMOL/L (ref 136–145)
WBC # BLD AUTO: 6.1 X10^3/UL (ref 4–11)

## 2018-12-24 PROCEDURE — 07DR3ZX EXTRACTION OF ILIAC BONE MARROW, PERCUTANEOUS APPROACH, DIAGNOSTIC: ICD-10-PCS | Performed by: RADIOLOGY

## 2018-12-24 RX ADMIN — POTASSIUM CHLORIDE SCH MEQ: 1500 TABLET, EXTENDED RELEASE ORAL at 12:54

## 2018-12-24 RX ADMIN — MAGNESIUM HYDROXIDE PRN MG: 400 SUSPENSION ORAL at 20:09

## 2018-12-24 RX ADMIN — Medication SCH CAP: at 12:54

## 2018-12-24 RX ADMIN — ATORVASTATIN CALCIUM SCH MG: 40 TABLET, FILM COATED ORAL at 22:10

## 2018-12-24 RX ADMIN — PIPERACILLIN SODIUM AND TAZOBACTAM SODIUM SCH MLS/HR: 3; .375 INJECTION, POWDER, LYOPHILIZED, FOR SOLUTION INTRAVENOUS at 17:00

## 2018-12-24 RX ADMIN — PIPERACILLIN SODIUM AND TAZOBACTAM SODIUM SCH MLS/HR: 3; .375 INJECTION, POWDER, LYOPHILIZED, FOR SOLUTION INTRAVENOUS at 05:02

## 2018-12-24 RX ADMIN — VANCOMYCIN HYDROCHLORIDE PRN EACH: 1 INJECTION, POWDER, LYOPHILIZED, FOR SOLUTION INTRAVENOUS at 12:02

## 2018-12-24 RX ADMIN — PIPERACILLIN SODIUM AND TAZOBACTAM SODIUM SCH MLS/HR: 3; .375 INJECTION, POWDER, LYOPHILIZED, FOR SOLUTION INTRAVENOUS at 12:00

## 2018-12-24 RX ADMIN — VANCOMYCIN HYDROCHLORIDE SCH MLS/HR: 1 INJECTION, POWDER, FOR SOLUTION INTRAVENOUS at 12:58

## 2018-12-24 RX ADMIN — Medication SCH CAP: at 22:10

## 2018-12-24 RX ADMIN — VANCOMYCIN HYDROCHLORIDE SCH MLS/HR: 1 INJECTION, POWDER, FOR SOLUTION INTRAVENOUS at 20:11

## 2018-12-24 NOTE — RAD
CT-guided bone marrow biopsy.  12/24/2018 11:22 AM



Indication:   polycythemia



Discussion: The risks and benefits of the procedure, including but not limited

to, bleeding and infection were discussed patient. Informed consent was

obtained. The patient was brought to the CT scanner and placed in the prone

position. A timeout procedure was performed.  CT imaging of the pelvis

demonstrated left ilium amenable to bone marrow biopsy. The overlying soft

tissues were prepped and draped using maximum sterile barrier technique. 1%

lidocaine without epinephrine was administered for local anesthesia. Under

intermittent CT guidance, an OncControl  needle was advanced into the bone

marrow of the left iliac crest. 2 Aspirates and 1 core biopsy samples were

obtained. Samples were delivered to pathology was present at the time of

procedure. The needle was removed and manual pressure held to achieve

hemostasis. No immediate complications were identified. 



The procedure was performed under conscious sedation including continuous

cardiopulmonary monitoring via dedicated sedation nurse.



Sedation time: 20 minutes



Impression: Successful CT-guided bone marrow biopsy of the left iliac crest .





PQRS Compliance Statement:



One or more of the following individualized dose reduction techniques were

utilized for this examination:

1. Automated exposure control

2. Adjustment of the mA and/or kV according to patient size

3. Use of iterative reconstruction technique

## 2018-12-24 NOTE — PDOC
Infectious Disease Note


Subjective


Subjective


Feeling alright this morning


Denies pain/F/C/N/V/D/SOA





ROS


ROS


no n/v/d/sob





Vital Sign


Vital Signs





Vital Signs








  Date Time  Temp Pulse Resp B/P (MAP) Pulse Ox O2 Delivery O2 Flow Rate FiO2


 


12/24/18 10:28  85 16  96 Nasal Cannula 2.0 


 


12/24/18 07:00 98.1   113/70 (84)    





 98.1       











Physical Exam


PHYSICAL EXAM


GENERAL:  Resting quietly 


HEENT:  Normal conjunctivae.  Oral cavity:  Pharynx pink and moist.


NECK:  Supple.


LUNGS:  Clear to auscultation.


HEART:  S1, S2.


ABDOMEN:  Nondistended, soft and nontender with bowel sounds present.


EXTREMITIES:  No gross edema or cyanosis.  Right lower extremity rubor with


wrinkles and dry flaky skin.  He has 2 ulcers located anterolateral aspect of


the shin and dorsal aspect of the right foot with bleeding.  Dorsalis pedis


pulse difficult to palpate.  His toe nails are thick and yellow.


SKIN:  Warm without rash.


NEUROLOGIC:  Alert and oriented x 3.





Labs


Lab





Laboratory Tests








Test


 12/24/18


06:05 12/24/18


06:50


 


White Blood Count


 6.1 x10^3/uL


(4.0-11.0) 





 


Red Blood Count


 5.59 x10^6/uL


(4.30-5.70) 





 


Hemoglobin


 18.0 g/dL


(13.0-17.5) 





 


Hematocrit


 53.1 %


(39.0-53.0) 





 


Mean Corpuscular Volume 95 fL ()  


 


Mean Corpuscular Hemoglobin 32 pg (25-35)  


 


Mean Corpuscular Hemoglobin


Concent 34 g/dL


(31-37) 





 


Red Cell Distribution Width


 16.3 %


(11.5-14.5) 





 


Platelet Count


 145 x10^3/uL


(140-400) 





 


Neutrophils (%) (Auto) 63 % (31-73)  


 


Lymphocytes (%) (Auto) 18 % (24-48)  


 


Monocytes (%) (Auto) 13 % (0-9)  


 


Eosinophils (%) (Auto) 5 % (0-3)  


 


Basophils (%) (Auto) 1 % (0-3)  


 


Neutrophils # (Auto)


 3.8 x10^3uL


(1.8-7.7) 





 


Lymphocytes # (Auto)


 1.1 x10^3/uL


(1.0-4.8) 





 


Monocytes # (Auto)


 0.8 x10^3/uL


(0.0-1.1) 





 


Eosinophils # (Auto)


 0.3 x10^3/uL


(0.0-0.7) 





 


Basophils # (Auto)


 0.0 x10^3/uL


(0.0-0.2) 





 


Reticulocyte Count (auto)


 0.9 %


(0.5-2.5) 





 


Prothrombin Time


 15.0 SEC


(11.7-14.0) 





 


Prothromb Time International


Ratio 1.2 (0.8-1.1) 


 





 


Sodium Level


 


 141 mmol/L


(136-145)


 


Potassium Level


 


 4.1 mmol/L


(3.5-5.1)


 


Chloride Level


 


 106 mmol/L


()


 


Carbon Dioxide Level


 


 26 mmol/L


(21-32)


 


Anion Gap  9 (6-14) 


 


Blood Urea Nitrogen


 


 10 mg/dL


(8-26)


 


Creatinine


 


 1.0 mg/dL


(0.7-1.3)


 


Estimated GFR


(Cockcroft-Gault) 


 72.3 





 


Glucose Level


 


 80 mg/dL


(70-99)


 


Calcium Level


 


 8.5 mg/dL


(8.5-10.1)








Micro


ANAEROBIC-AEROBIC CULTURE  


                                PENDING





  ANAEROBIC RES 1  


                                PENDING





  AEROBIC CULT  


                                PENDING





  AEROBIC RES 1  


                                PENDING





  GRAM STAIN  Final  


        Final report





  GRAM STAIN RES 1  Final  


        Comment





        No white blood cells seen.





  GRAM STAIN RES 2  Final  


        Comment





        Moderate number of gram negative diplococci.


        Performed at:  [facility], [place], [place]  [id number]


        : MEME Jackson MD, Phone:  [phone]





Objective


Assessment


RLE ulcerations and cellulitis


Arterial occlusive disease, vascular following. May need aorto-fem bypass graft 

and possible right fem-pop-bypass graft 


h/o DVT/PE


Tobacco dependence 


COPD





Plan


Plan of Care


continue vanc and Zosyn


wound culture in process


Monitor labs/temp/renal function closely


Local wound care











MIKA SEGOVIA MD Dec 24, 2018 11:31

## 2018-12-24 NOTE — PDOC
MORENO POWELL APRANUP 12/24/18 1149:


CARDIO Progress Notes


Date and Time


Date of Service


12/24/18





Subjective


Subjective:  No Chest Pain, No shortness of breath, No Palpitations





Vitals


Vitals





Vital Signs








  Date Time  Temp Pulse Resp B/P (MAP) Pulse Ox O2 Delivery O2 Flow Rate FiO2


 


12/24/18 10:28  85 16  96 Nasal Cannula 2.0 


 


12/24/18 07:00 98.1   113/70 (84)    





 98.1       








Weight


Weight [ ]





Input and Output


Intake and Output











Intake and Output 


 


 12/24/18





 07:01


 


Intake Total 400 ml


 


Output Total 1300 ml


 


Balance -900 ml


 


 


 


IV Total 400 ml


 


Output Urine Total 1300 ml











Laboratory


Labs





Laboratory Tests








Test


 12/24/18


06:05 12/24/18


06:50


 


White Blood Count


 6.1 x10^3/uL


(4.0-11.0) 





 


Red Blood Count


 5.59 x10^6/uL


(4.30-5.70) 





 


Hemoglobin


 18.0 g/dL


(13.0-17.5) 





 


Hematocrit


 53.1 %


(39.0-53.0) 





 


Mean Corpuscular Volume 95 fL ()  


 


Mean Corpuscular Hemoglobin 32 pg (25-35)  


 


Mean Corpuscular Hemoglobin


Concent 34 g/dL


(31-37) 





 


Red Cell Distribution Width


 16.3 %


(11.5-14.5) 





 


Platelet Count


 145 x10^3/uL


(140-400) 





 


Neutrophils (%) (Auto) 63 % (31-73)  


 


Lymphocytes (%) (Auto) 18 % (24-48)  


 


Monocytes (%) (Auto) 13 % (0-9)  


 


Eosinophils (%) (Auto) 5 % (0-3)  


 


Basophils (%) (Auto) 1 % (0-3)  


 


Neutrophils # (Auto)


 3.8 x10^3uL


(1.8-7.7) 





 


Lymphocytes # (Auto)


 1.1 x10^3/uL


(1.0-4.8) 





 


Monocytes # (Auto)


 0.8 x10^3/uL


(0.0-1.1) 





 


Eosinophils # (Auto)


 0.3 x10^3/uL


(0.0-0.7) 





 


Basophils # (Auto)


 0.0 x10^3/uL


(0.0-0.2) 





 


Reticulocyte Count (auto)


 0.9 %


(0.5-2.5) 





 


Prothrombin Time


 15.0 SEC


(11.7-14.0) 





 


Prothromb Time International


Ratio 1.2 (0.8-1.1) 


 





 


Sodium Level


 


 141 mmol/L


(136-145)


 


Potassium Level


 


 4.1 mmol/L


(3.5-5.1)


 


Chloride Level


 


 106 mmol/L


()


 


Carbon Dioxide Level


 


 26 mmol/L


(21-32)


 


Anion Gap  9 (6-14) 


 


Blood Urea Nitrogen


 


 10 mg/dL


(8-26)


 


Creatinine


 


 1.0 mg/dL


(0.7-1.3)


 


Estimated GFR


(Cockcroft-Gault) 


 72.3 





 


Glucose Level


 


 80 mg/dL


(70-99)


 


Calcium Level


 


 8.5 mg/dL


(8.5-10.1)











Microbiology


Micro





Microbiology


12/19/18 Blood Culture - Preliminary, Resulted


           NO GROWTH AFTER 4 DAYS


12/22/18 Anaerobic/Aerobic Culture, Resulted


           Pending


12/22/18 Anaerobic Culture Result 1 (SHAMA), Resulted


           Pending


12/22/18 Aerobic Culture, Resulted


           Pending


12/22/18 Aerobic Culture Result 1 (SHAMA), Resulted


           Pending


12/22/18 Gram Stain - Final, Resulted


           


12/22/18 Gram Stain Result 1 (SHAMA) - Final, Resulted


           


12/22/18 Gram Stain Result 2 (SHAMA) - Final, Resulted





Physical Exam


HEENT:  Neck Supple W Full Motion


Chest:  Symmetric


LUNGS:  Clear to Auscultation


Heart:  S1S2, RRR, no gallops


Abdomen:  Soft N/T


Extremities:  Other (DRSg intact to bilateral LE. erythema of RLE)


Neurology:  alert, follow commands, other





Assessment


Assessment


1.  Severe bilateral PAD with RLE wound/ulceration. CTA showing extensive 

disease. Vascular surgery following; aortofemoral reconstruction planned 

pending further workup. Echo showed preserved LV systolic function. 


2.  RLE cellulitis


3.  Hypertension; low normotensive


4.  Hyperlipidemia


5.  H/o PE; tx with Eliquis. CTA with resolution


6.  Polycythemia; bone marrow biopsy today.








Recommendations





Check lipids; statin if indications


Add ASA


MPI today for further risk stratification.





SOCO JORDAN MD 12/24/18 1412:


CARDIO Progress Notes


Plan


Plan


Pt. seen and examined. Agree with above NP note. 


MPI negative. He will undergoing extensive vascular surgery, from a purely CV 

standpoint, he would be deemed low to moderate risk. 


Supportive care. Eliquis stopped. Will start asa. Statin added.











MORENO POWELL Dec 24, 2018 11:49


SOCO JORDAN MD Dec 24, 2018 14:12

## 2018-12-24 NOTE — PDOC
PROGRESS NOTES


Subjective


Subjective


HPI - f/u of  Polycythemia





ROS - no fever





Objective


Objective





Vital Signs








  Date Time  Temp Pulse Resp B/P (MAP) Pulse Ox O2 Delivery O2 Flow Rate FiO2


 


12/24/18 07:00 98.1 68 18 113/70 (84) 92 Room Air  





 98.1       


 


12/24/18 05:27       2.0 














Intake and Output 


 


 12/24/18





 07:01


 


Intake Total 400 ml


 


Output Total 1300 ml


 


Balance -900 ml


 


 


 


IV Total 400 ml


 


Output Urine Total 1300 ml











Physical Exam


General:  Alert, Oriented X3, No acute distress


Neuro:  Normal speech


Psych/Mental Status:  Mental status NL





Assessment


Assessment


Problems


Medical Problems:


(1) Cellulitis of right lower leg


Status: Acute  





(2) Right foot ulcer


Status: Acute  





IMPRESSION AND PLAN:





1.  Polycythemia.  He has had elevated hemoglobin and hematocrit since 2015. 


His erythropoietin was low at 2.5 in 2015.  However, the JAK2 mutation was


negative.  I agree to proceed with further testing with next generation


sequencing for JAK2 mutation.  It appears that this was ordered by Dr. Lupe Ulloa in 2017 and I will check with Pathology if this was completed.  In


addition, he also needs a bone marrow biopsy for diagnosis of polycythemia vera


and I discussed with the patient and he agrees to proceed with a biopsy to be


done on 12/24/2018.  The patient does understand that he had a phlebotomy on


05/08/2017, but he did not understand that it is therapeutic phlebotomy and he


felt the old bag of blood was taken for testing.  I educated him that phlebotomy


was a therapeutic procedure.  In addition, he has not been compliant with


followup appointments.  He did not keep his appointment with Dr. Lupe Ulloa and


he did not keep his appointment with Dr. Karla Atkinson.  I have advised him to


be compliant for followup and management.


Hb 18.4 on 12/23/18.


Hb 18 on 12/24/18.





Bone marrow bx 12/24/18.


f/u with me in 2 weeks.





2.  Pulmonary embolism in 02/2018.  He also has a history of DVT in the past. 


He is on Eliquis and the pulmonary embolism has resolved as of CT angiogram done


on 12/22/2018.  I discussed with Dr. Atkinson and Dr. Atkinson is considering to


discontinue. No h/o DVT, he reports blood clot in rt leg in 2004, but did not 

receive anticoagulation per pt


and hence unlikely that this was DVT.





3.  Peripheral arterial disease.  Appreciate Vascular Surgery consultation.  He


will need surgery after the hemoglobin and hematocrit are controlled and after


the workup is completed.





4. Thrombocytopenia, monitor cbc, plt better at 145 on 12/24/18.











Comment


Review of Relevant


I have reviewed the following items karen (where applicable) has been applied.


Labs





Laboratory Tests








Test


 12/23/18


09:15 12/24/18


06:05 12/24/18


06:50


 


White Blood Count


 6.5 x10^3/uL


(4.0-11.0) 6.1 x10^3/uL


(4.0-11.0) 





 


Red Blood Count


 5.74 x10^6/uL


(4.30-5.70) 5.59 x10^6/uL


(4.30-5.70) 





 


Hemoglobin


 18.4 g/dL


(13.0-17.5) 18.0 g/dL


(13.0-17.5) 





 


Hematocrit


 54.6 %


(39.0-53.0) 53.1 %


(39.0-53.0) 





 


Mean Corpuscular Volume 95 fL ()  95 fL ()  


 


Mean Corpuscular Hemoglobin 32 pg (25-35)  32 pg (25-35)  


 


Mean Corpuscular Hemoglobin


Concent 34 g/dL


(31-37) 34 g/dL


(31-37) 





 


Red Cell Distribution Width


 16.7 %


(11.5-14.5) 16.3 %


(11.5-14.5) 





 


Platelet Count


 138 x10^3/uL


(140-400) 145 x10^3/uL


(140-400) 





 


Neutrophils (%) (Auto) 70 % (31-73)  63 % (31-73)  


 


Lymphocytes (%) (Auto) 14 % (24-48)  18 % (24-48)  


 


Monocytes (%) (Auto) 11 % (0-9)  13 % (0-9)  


 


Eosinophils (%) (Auto) 4 % (0-3)  5 % (0-3)  


 


Basophils (%) (Auto) 1 % (0-3)  1 % (0-3)  


 


Neutrophils # (Auto)


 4.5 x10^3uL


(1.8-7.7) 3.8 x10^3uL


(1.8-7.7) 





 


Lymphocytes # (Auto)


 0.9 x10^3/uL


(1.0-4.8) 1.1 x10^3/uL


(1.0-4.8) 





 


Monocytes # (Auto)


 0.7 x10^3/uL


(0.0-1.1) 0.8 x10^3/uL


(0.0-1.1) 





 


Eosinophils # (Auto)


 0.3 x10^3/uL


(0.0-0.7) 0.3 x10^3/uL


(0.0-0.7) 





 


Basophils # (Auto)


 0.0 x10^3/uL


(0.0-0.2) 0.0 x10^3/uL


(0.0-0.2) 





 


Sodium Level


 140 mmol/L


(136-145) 


 141 mmol/L


(136-145)


 


Potassium Level


 3.4 mmol/L


(3.5-5.1) 


 4.1 mmol/L


(3.5-5.1)


 


Chloride Level


 104 mmol/L


() 


 106 mmol/L


()


 


Carbon Dioxide Level


 27 mmol/L


(21-32) 


 26 mmol/L


(21-32)


 


Anion Gap 9 (6-14)   9 (6-14) 


 


Blood Urea Nitrogen


 12 mg/dL


(8-26) 


 10 mg/dL


(8-26)


 


Creatinine


 0.9 mg/dL


(0.7-1.3) 


 1.0 mg/dL


(0.7-1.3)


 


Estimated GFR


(Cockcroft-Gault) 81.6 


 


 72.3 





 


BUN/Creatinine Ratio 13 (6-20)   


 


Glucose Level


 127 mg/dL


(70-99) 


 80 mg/dL


(70-99)


 


Calcium Level


 8.4 mg/dL


(8.5-10.1) 


 8.5 mg/dL


(8.5-10.1)


 


Total Bilirubin


 1.0 mg/dL


(0.2-1.0) 


 





 


Aspartate Amino Transf


(AST/SGOT) 22 U/L (15-37) 


 


 





 


Alanine Aminotransferase


(ALT/SGPT) 17 U/L (16-63) 


 


 





 


Alkaline Phosphatase


 78 U/L


() 


 





 


Total Protein


 6.3 g/dL


(6.4-8.2) 


 





 


Albumin


 2.7 g/dL


(3.4-5.0) 


 





 


Albumin/Globulin Ratio 0.8 (1.0-1.7)   


 


Prothrombin Time


 


 15.0 SEC


(11.7-14.0) 





 


Prothromb Time International


Ratio 


 1.2 (0.8-1.1) 


 











Laboratory Tests








Test


 12/23/18


09:15 12/24/18


06:05 12/24/18


06:50


 


White Blood Count


 6.5 x10^3/uL


(4.0-11.0) 6.1 x10^3/uL


(4.0-11.0) 





 


Red Blood Count


 5.74 x10^6/uL


(4.30-5.70) 5.59 x10^6/uL


(4.30-5.70) 





 


Hemoglobin


 18.4 g/dL


(13.0-17.5) 18.0 g/dL


(13.0-17.5) 





 


Hematocrit


 54.6 %


(39.0-53.0) 53.1 %


(39.0-53.0) 





 


Mean Corpuscular Volume 95 fL ()  95 fL ()  


 


Mean Corpuscular Hemoglobin 32 pg (25-35)  32 pg (25-35)  


 


Mean Corpuscular Hemoglobin


Concent 34 g/dL


(31-37) 34 g/dL


(31-37) 





 


Red Cell Distribution Width


 16.7 %


(11.5-14.5) 16.3 %


(11.5-14.5) 





 


Platelet Count


 138 x10^3/uL


(140-400) 145 x10^3/uL


(140-400) 





 


Neutrophils (%) (Auto) 70 % (31-73)  63 % (31-73)  


 


Lymphocytes (%) (Auto) 14 % (24-48)  18 % (24-48)  


 


Monocytes (%) (Auto) 11 % (0-9)  13 % (0-9)  


 


Eosinophils (%) (Auto) 4 % (0-3)  5 % (0-3)  


 


Basophils (%) (Auto) 1 % (0-3)  1 % (0-3)  


 


Neutrophils # (Auto)


 4.5 x10^3uL


(1.8-7.7) 3.8 x10^3uL


(1.8-7.7) 





 


Lymphocytes # (Auto)


 0.9 x10^3/uL


(1.0-4.8) 1.1 x10^3/uL


(1.0-4.8) 





 


Monocytes # (Auto)


 0.7 x10^3/uL


(0.0-1.1) 0.8 x10^3/uL


(0.0-1.1) 





 


Eosinophils # (Auto)


 0.3 x10^3/uL


(0.0-0.7) 0.3 x10^3/uL


(0.0-0.7) 





 


Basophils # (Auto)


 0.0 x10^3/uL


(0.0-0.2) 0.0 x10^3/uL


(0.0-0.2) 





 


Sodium Level


 140 mmol/L


(136-145) 


 141 mmol/L


(136-145)


 


Potassium Level


 3.4 mmol/L


(3.5-5.1) 


 4.1 mmol/L


(3.5-5.1)


 


Chloride Level


 104 mmol/L


() 


 106 mmol/L


()


 


Carbon Dioxide Level


 27 mmol/L


(21-32) 


 26 mmol/L


(21-32)


 


Anion Gap 9 (6-14)   9 (6-14) 


 


Blood Urea Nitrogen


 12 mg/dL


(8-26) 


 10 mg/dL


(8-26)


 


Creatinine


 0.9 mg/dL


(0.7-1.3) 


 1.0 mg/dL


(0.7-1.3)


 


Estimated GFR


(Cockcroft-Gault) 81.6 


 


 72.3 





 


BUN/Creatinine Ratio 13 (6-20)   


 


Glucose Level


 127 mg/dL


(70-99) 


 80 mg/dL


(70-99)


 


Calcium Level


 8.4 mg/dL


(8.5-10.1) 


 8.5 mg/dL


(8.5-10.1)


 


Total Bilirubin


 1.0 mg/dL


(0.2-1.0) 


 





 


Aspartate Amino Transf


(AST/SGOT) 22 U/L (15-37) 


 


 





 


Alanine Aminotransferase


(ALT/SGPT) 17 U/L (16-63) 


 


 





 


Alkaline Phosphatase


 78 U/L


() 


 





 


Total Protein


 6.3 g/dL


(6.4-8.2) 


 





 


Albumin


 2.7 g/dL


(3.4-5.0) 


 





 


Albumin/Globulin Ratio 0.8 (1.0-1.7)   


 


Prothrombin Time


 


 15.0 SEC


(11.7-14.0) 





 


Prothromb Time International


Ratio 


 1.2 (0.8-1.1) 


 











Microbiology


12/19/18 Blood Culture - Preliminary, Resulted


           NO GROWTH AFTER 4 DAYS


Medications





Current Medications


Sodium Chloride 1,000 ml @  1,000 mls/hr 1X  ONCE IV  Last administered on 12/19 /18at 19:19;  Start 12/19/18 at 19:00;  Stop 12/19/18 at 19:59;  Status DC


Vancomycin HCl (Vanco Per Pharmacy) 1 each PRN DAILY  PRN MC SEE COMMENTS Last 

administered on 12/23/18at 13:44;  Start 12/19/18 at 19:45


Vancomycin HCl 2 gm/Sodium Chloride 500 ml @  250 mls/hr ONCE  ONCE IV  Last 

administered on 12/19/18at 20:00;  Start 12/19/18 at 19:45;  Stop 12/19/18 at 21

:44;  Status DC


Ondansetron HCl (Zofran) 4 mg PRN Q8HRS  PRN IV NAUSEA/VOMITING 1ST CHOICE;  

Start 12/19/18 at 21:15;  Stop 12/20/18 at 21:14;  Status DC


Sodium Chloride 1,000 ml @  125 mls/hr Q8H IV  Last administered on 12/20/18at 

20:01;  Start 12/19/18 at 21:03;  Stop 12/20/18 at 21:02;  Status DC


Fentanyl Citrate (Fentanyl 2ml Vial) 25 mcg PRN Q2HR  PRN IV SEVERE PAIN Last 

administered on 12/20/18at 13:59;  Start 12/20/18 at 01:45;  Stop 12/20/18 at 14

:39;  Status DC


Vancomycin HCl 1.25 gm/Sodium Chloride 250 ml @  167 mls/hr Q12H IV  Last 

administered on 12/23/18at 20:04;  Start 12/20/18 at 08:00


Vancomycin HCl (Vancomycin Trough Level) 1 each 1X  ONCE MC ;  Start 12/22/18 

at 07:30;  Stop 12/22/18 at 07:30;  Status DC


Lactobacillus Rhamnosus (Culturelle) 1 cap BID PO  Last administered on 12/23/ 18at 20:05;  Start 12/20/18 at 09:00


Fentanyl Citrate (Fentanyl 2ml Vial) 50 mcg PRN Q2HR  PRN IV SEVERE PAIN Last 

administered on 12/21/18at 09:05;  Start 12/20/18 at 14:45


Apixaban (Eliquis) 5 mg BID PO  Last administered on 12/23/18at 09:39;  Start 12 /20/18 at 15:00


Acetaminophen/ Hydrocodone Bitart (Lortab 7.5/325) 1 tab PRN Q4HRS  PRN PO PAIN 

Last administered on 12/24/18at 05:27;  Start 12/21/18 at 11:15


Info (Anti-Coagulation Monitoring By Pharmacy) 1 each PRN DAILY  PRN MC SEE 

COMMENTS Last administered on 12/23/18at 13:08;  Start 12/21/18 at 11:30


Magnesium Hydroxide (Milk Of Magnesia) 2,400 mg PRN DAILY  PRN PO CONSTIPATION 

Last administered on 12/22/18at 20:01;  Start 12/21/18 at 14:15


Iohexol (Omnipaque 300 Mg/ml) 100 ml 1X  ONCE IV  Last administered on 12/22/ 18at 11:45;  Start 12/22/18 at 11:45;  Stop 12/22/18 at 11:46;  Status DC


Info (CONTRAST GIVEN -- Rx MONITORING) 1 each PRN DAILY  PRN MC SEE COMMENTS;  

Start 12/22/18 at 11:45;  Stop 12/24/18 at 11:44


Piperacillin Sod/ Tazobactam Sod 3.375 gm/Sodium Chloride 50 ml @  100 mls/hr 

Q6HRS IV  Last administered on 12/24/18at 05:02;  Start 12/22/18 at 18:30


Potassium Chloride (Klor-Con) 40 meq 1X  ONCE PO  Last administered on 12/23/ 18at 17:44;  Start 12/23/18 at 14:30;  Stop 12/23/18 at 14:31;  Status DC


Potassium Chloride (Klor-Con) 20 meq DAILYWBKFT PO ;  Start 12/24/18 at 08:00


Iohexol (Omnipaque 300 Mg/ml) 95 ml 1X  ONCE IV  Last administered on 12/23/ 18at 15:09;  Start 12/23/18 at 14:45;  Stop 12/23/18 at 14:47;  Status DC


Info (CONTRAST GIVEN -- Rx MONITORING) 1 each PRN DAILY  PRN MC SEE COMMENTS;  

Start 12/23/18 at 15:00;  Stop 12/25/18 at 14:59


Regadenoson (Lexiscan) 0.4 mg 1X  ONCE IV ;  Start 12/24/18 at 08:30;  Stop 12/ 24/18 at 08:31;  Status DC





Active Scripts


Active


Reported


Eliquis (Apixaban) 5 Mg Tablet 5 Mg PO BID


Vitals/I & O





Vital Sign - Last 24 Hours








 12/23/18 12/23/18 12/23/18 12/23/18





 09:39 11:00 15:28 19:00


 


Temp  98.1 98.2 98.1





  98.1 98.2 98.1


 


Pulse  76 75 71


 


Resp 18 18 20 17


 


B/P (MAP)  104/52 (69) 100/50 (67) 125/47 (73)


 


Pulse Ox  94 94 94


 


O2 Delivery Room Air Nasal Cannula Room Air Room Air


 


    





    





 12/23/18 12/23/18 12/23/18 12/24/18





 20:00 20:07 23:00 01:14


 


Temp   98.2 





   98.2 


 


Pulse   77 


 


Resp  18  18


 


B/P (MAP)   105/48 (67) 


 


Pulse Ox  94 93 93


 


O2 Delivery Room Air Room Air Room Air Room Air


 


O2 Flow Rate  2.0  


 


    





    





 12/24/18 12/24/18 12/24/18 12/24/18





 03:00 05:27 06:27 07:00


 


Temp 98.1   98.1





 98.1   98.1


 


Pulse 75   68


 


Resp 16 18 18 18


 


B/P (MAP) 102/52 (69)   113/70 (84)


 


Pulse Ox 93 93 93 92


 


O2 Delivery Room Air Room Air Room Air Room Air


 


O2 Flow Rate  2.0  














Intake and Output   


 


 12/23/18 12/23/18 12/24/18





 15:01 23:01 07:01


 


Intake Total  300 ml 100 ml


 


Output Total 100 ml  1200 ml


 


Balance -100 ml 300 ml -1100 ml

















LUCIUS GREY MD Dec 24, 2018 08:36

## 2018-12-24 NOTE — RAD
MR#: [id number]

Account#: [id number]

Accession#: [id number]

Date of Study: 12/24/2018

Ordering Physician: ELLIOTT ROGEL, 

Referring Physician: BRYN REGAN Tech: RT CELE Corbett) (N)





--------------- APPROVED REPORT --------------





Test Type:          Pharmacological

Stress Nurse/Tech: Jessenia Brody RN

Test Indications: CAD, preop foot wound

Cardiac History: Hypertension,smoker,COPD

Medications:     See Electronic Medical Record

Medical History: See Electronic Medical Record

Resting ECG:     SR with BBB

Resting Heart Rate: 79 bpm

Resting Blood Pressure: 108/55mmHg

Pretest Chest Pain: No chest pain



Nurse/Tech Notes

S1,S2 and lungs are clear to auscultation.

Consent: The procedure was explained to the patient in lay terms. Informed consent was witnessed. Albino
eout was entered into [employer]. History and Stress Test performed by RT Chelle (R) (N)



Pharm. Details

Pharmacologic stress testing was performed using 0.4mg per 5ml of regadenoson given intravenously ove
r 7-10 seconds.



Stress Symptoms

Dyspnea



POST EXERCISE

Reason for Termination: Infusion complete

Target HR: Yes

Max HR: 154 bpm

Max Blood Pressure: 116/50mmHg

Blood Pressure response to exercise: Normal blood pressure response during stress.

Heart Rate response to exercise: WNL

Chest Pain: No. 

Arrhythmia: No. 

ST Change: No. 



INTERPRETATION

Stress EKG Conclusion: No evidence of stress induced EKG changes. 



Imaging Protocol

IMAGE PROTOCOL: Rest Tc-99m/stress Tc-99m 1 day



Rest:            Stress:         Viability:   

Radiopharm.Tc99m [id number] Sestamibi

Dose10.4mCi            32.8mCi            

Duration    13min.           13min.           

Img Date  12/24/2018 12/24/2018      

Inj-Img [id number].           60min.           



Rest Admin Site:IV - Left AntecubitalAdministrator:RT Chelle (KIM)(N)

Stress Admin Site: IV - Left AntecubitalAdministrator: HILDA Caldwell



STRESS DATA

End Diast. Vol.90.0mlAv. Heart Rate74.0bpm

LVEDV index BSA48.0mlCardiac Output0.0L/min

End Syst. Vol.34.0mlCO Index BSA0.0L/min

LVESV index BSA18.0mlMyocardial Gezz326.0g

Eject. Imnemgpk78.0%



Stress Scores

Regional WT2.00Summed WT23.00

Regional WM0.00Summed WM0.00



The rest and stress images show normal perfusion, normal contraction and thickening.



LV Perf. Quant

17 Seg. SSS2.00

17 Seg. SRS9.00

17 Seg. SDS0.00

Stress Defect Extent (% LAD)5.00Rest Defect Extent (% LAD)10.00Rev. Defect Extent (% LAD)0.00

Stress Defect Extent (% LCX) 8.80Rest Defect Extent (% LCX)37.50Rev. Defect Extent (% LCX)0.00

Stress Defect Extent (% RCA)0.00Rest Defect Extent (% RCA)25.60Rev. Defect Extent (% RCA)0.00

Stress Defect Extent (% ROSALIND)4.80Rest Defect Extent (% ROSALIND)21.30Rev. Defect Extent (% ROSALIND)0.00



Other Information

Quality:Good

Risk Assessment: Low Risk



Conclusion

1. No evidence of EKG changes with stress testing.

2. Normal perfusion at stress/rest.

3. Low risk study.

4. EF > 60%.



Signed by : Casey Nunes, 

Electronically Approved : 12/24/2018 13:34:51

## 2018-12-24 NOTE — PDOC
PROGRESS NOTES


History of Present Illness


History of Present Illness





Assessment/Plan


Assessment/Plan


impression


1. cellulitis right foot and leg DEEP DORSAL FOOT WOUND POOR CAPILLARY REFILL


 


2. Mild soft tissue swelling identified dorsal to the metatarsal could be 


edema or soft tissue infection.


 


3. No acute osseous findings.





4. tobacco abuse disorder





5.  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 Collateral flow


reconstitutes the right superficial femoral artery proximally but more


distally the vessel is severely tapered and probably occluded. There is no


significant flow seen in the right popliteal artery. There is two-vessel


runoff to the ankle from reconstituted collateral vessels. in 2017





6. HX PE





7. POLYCYTHEMIA





8. HYPOKALEMIA





9. SEPSIS


 


 


 








plan


REPLACE K


repeat CT angiogram to more clearly delineate arterial anatomy prior to 

scheduling


CELINA-2 assay NEG 2017


PULM CONSULT


HEME CONSULT REVIEWED


iv vanc Q 12 HRS


ID CONSULT


WOUND CARE CONSULT


dvt prophylaxis


blood cult


vascular consult reviewed





Vitals


Vitals





Vital Signs








  Date Time  Temp Pulse Resp B/P (MAP) Pulse Ox O2 Delivery O2 Flow Rate FiO2


 


12/24/18 11:00 97.2 76 18 101/59 (73) 93 Room Air  





 97.2       


 


12/24/18 10:28       2.0 











Physical Exam


Physical Exam


GENERAL:  Resting quietly 


HEENT:  Normal conjunctivae.  Oral cavity:  Pharynx pink and moist.


NECK:  Supple.


LUNGS:  Clear to auscultation.


HEART:  S1, S2.


ABDOMEN:  Nondistended, soft and nontender with bowel sounds present.


EXTREMITIES:  No gross edema or cyanosis.  Right lower extremity rubor with


wrinkles and dry flaky skin.  He has 2 ulcers located anterolateral aspect of


the shin and dorsal aspect of the right foot with bleeding.  Dorsalis pedis


pulse difficult to palpate.  His toe nails are thick and yellow.


SKIN:  Warm without rash.


NEUROLOGIC:  Alert and oriented x 3.


General:  Alert, Oriented X3, No acute distress


Heart:  Regular rate, Normal S1, Normal S2, No murmurs


Lungs:  Clear


Abdomen:  Normal bowel sounds, Soft, No tenderness


Extremities:  No cyanosis, Other (ISCHEMIC ULCER RIGHT FOOT/ CELLULITIS DEEP 

DORSAL FOOT WOUND)


Skin:  Other (MARKED VASCULAR INSUFF CHANGES RIGHT LOWER LEG, SKIN DRY,FRAGILE)





Labs


LABS


                                                             TALIA JAMES MD


ORDERED:  ANAER/AEROB/GS                                                       

   


--------------------------------------------------------------------------------

------------





  Procedure                         Result                                     

           


--------------------------------------------------------------------------------

------------





  ANAEROBIC-AEROBIC CULTURE  


                                PENDING





  ANAEROBIC RES 1  


                                PENDING





  AEROBIC CULT  


                                PENDING





  AEROBIC RES 1  


                                PENDING





  GRAM STAIN  Final  


        Final report





  GRAM STAIN RES 1  Final  


        Comment





        No white blood cells seen.





  GRAM STAIN RES 2  Final  


        Comment





        Moderate number of gram negative diplococci.


        Performed at:   - [facility], [place], [place]  [id number]


        : MEME Jackson MD, Phone:  [phone]





--------------------------------------------------------------------------------

------------














Laboratory Tests








Test


 12/24/18


06:05 12/24/18


06:50


 


White Blood Count


 6.1 x10^3/uL


(4.0-11.0) 





 


Red Blood Count


 5.59 x10^6/uL


(4.30-5.70) 





 


Hemoglobin


 18.0 g/dL


(13.0-17.5) 





 


Hematocrit


 53.1 %


(39.0-53.0) 





 


Mean Corpuscular Volume 95 fL ()  


 


Mean Corpuscular Hemoglobin 32 pg (25-35)  


 


Mean Corpuscular Hemoglobin


Concent 34 g/dL


(31-37) 





 


Red Cell Distribution Width


 16.3 %


(11.5-14.5) 





 


Platelet Count


 145 x10^3/uL


(140-400) 





 


Neutrophils (%) (Auto) 63 % (31-73)  


 


Lymphocytes (%) (Auto) 18 % (24-48)  


 


Monocytes (%) (Auto) 13 % (0-9)  


 


Eosinophils (%) (Auto) 5 % (0-3)  


 


Basophils (%) (Auto) 1 % (0-3)  


 


Neutrophils # (Auto)


 3.8 x10^3uL


(1.8-7.7) 





 


Lymphocytes # (Auto)


 1.1 x10^3/uL


(1.0-4.8) 





 


Monocytes # (Auto)


 0.8 x10^3/uL


(0.0-1.1) 





 


Eosinophils # (Auto)


 0.3 x10^3/uL


(0.0-0.7) 





 


Basophils # (Auto)


 0.0 x10^3/uL


(0.0-0.2) 





 


Reticulocyte Count (auto)


 0.9 %


(0.5-2.5) 





 


Prothrombin Time


 15.0 SEC


(11.7-14.0) 





 


Prothromb Time International


Ratio 1.2 (0.8-1.1) 


 





 


Sodium Level


 


 141 mmol/L


(136-145)


 


Potassium Level


 


 4.1 mmol/L


(3.5-5.1)


 


Chloride Level


 


 106 mmol/L


()


 


Carbon Dioxide Level


 


 26 mmol/L


(21-32)


 


Anion Gap  9 (6-14) 


 


Blood Urea Nitrogen


 


 10 mg/dL


(8-26)


 


Creatinine


 


 1.0 mg/dL


(0.7-1.3)


 


Estimated GFR


(Cockcroft-Gault) 


 72.3 





 


Glucose Level


 


 80 mg/dL


(70-99)


 


Calcium Level


 


 8.5 mg/dL


(8.5-10.1)











Assessment and Plan


Assessmemt and Plan


Problems


Medical Problems:


(1) Cellulitis of right lower leg


Status: Acute  





(2) Right foot ulcer


Status: Acute  








INTERPRETATION


Stress EKG Conclusion: No evidence of stress induced EKG changes. 





Imaging Protocol


IMAGE PROTOCOL: Rest Tc-99m/stress Tc-99m 1 day





      Rest:               Stress:            Viability:            


Radiopharm.   Tc99m Sestamibi      Tc99m Sestamibi                  


Dose      10.4mCi               32.8mCi                           


Duration       13min.              13min.                          


Img Date     12/24/2018 12/24/2018                     


Inj-Img Time   60min.              60min.                          





Rest Admin Site:   IV - Left Antecubital   :   Aria Yanez, RT (R)(N

)   


Stress Admin Site:    IV - Left Antecubital   :    HILDA Caldwell   





STRESS DATA


End Diast. Vol.   90.0ml   Av. Heart Rate   74.0bpm


LVEDV index BSA   48.0ml   Cardiac Output   0.0L/min


End Syst. Vol.   34.0ml   CO Index BSA   0.0L/min


LVESV index BSA   18.0ml   Myocardial Mass   120.0g


Eject. Fraction   62.0%         





Stress Scores


Regional WT   2.00   Summed WT   23.00


Regional WM   0.00   Summed WM   0.00





The rest and stress images show normal perfusion, normal contraction and 

thickening.





LV Perf. Quant


17 Seg. SSS         2.00                              


17 Seg. SRS         9.00                              


17 Seg. SDS         0.00                              


Stress Defect Extent (% LAD)   5.00   Rest Defect Extent (% LAD)   10.00   Rev. 

Defect Extent (% LAD)   0.00


Stress Defect Extent (% LCX)    8.80   Rest Defect Extent (% LCX)   37.50   

Rev. Defect Extent (% LCX)   0.00


Stress Defect Extent (% RCA)   0.00   Rest Defect Extent (% RCA)   25.60   Rev. 

Defect Extent (% RCA)   0.00


Stress Defect Extent (% ROSALIND)   4.80   Rest Defect Extent (% ROSALIND)   21.30   Rev. 

Defect Extent (% ROSALIND)   0.00





Other Information


Quality:Good


Risk Assessment: Low Risk








Signed by : Soco Nunes, 


Electronically Approved : 12/24/2018 13:34:51














DICTATED and SIGNED BY:     SOCO NUNES MD


DATE:     12/24/18 8088





Comment


Review of Relevant


I have reviewed the following items karen (where applicable) has been applied.


Labs





Laboratory Tests








Test


 12/23/18


09:15 12/24/18


06:05 12/24/18


06:50


 


White Blood Count


 6.5 x10^3/uL


(4.0-11.0) 6.1 x10^3/uL


(4.0-11.0) 





 


Red Blood Count


 5.74 x10^6/uL


(4.30-5.70) 5.59 x10^6/uL


(4.30-5.70) 





 


Hemoglobin


 18.4 g/dL


(13.0-17.5) 18.0 g/dL


(13.0-17.5) 





 


Hematocrit


 54.6 %


(39.0-53.0) 53.1 %


(39.0-53.0) 





 


Mean Corpuscular Volume 95 fL ()  95 fL ()  


 


Mean Corpuscular Hemoglobin 32 pg (25-35)  32 pg (25-35)  


 


Mean Corpuscular Hemoglobin


Concent 34 g/dL


(31-37) 34 g/dL


(31-37) 





 


Red Cell Distribution Width


 16.7 %


(11.5-14.5) 16.3 %


(11.5-14.5) 





 


Platelet Count


 138 x10^3/uL


(140-400) 145 x10^3/uL


(140-400) 





 


Neutrophils (%) (Auto) 70 % (31-73)  63 % (31-73)  


 


Lymphocytes (%) (Auto) 14 % (24-48)  18 % (24-48)  


 


Monocytes (%) (Auto) 11 % (0-9)  13 % (0-9)  


 


Eosinophils (%) (Auto) 4 % (0-3)  5 % (0-3)  


 


Basophils (%) (Auto) 1 % (0-3)  1 % (0-3)  


 


Neutrophils # (Auto)


 4.5 x10^3uL


(1.8-7.7) 3.8 x10^3uL


(1.8-7.7) 





 


Lymphocytes # (Auto)


 0.9 x10^3/uL


(1.0-4.8) 1.1 x10^3/uL


(1.0-4.8) 





 


Monocytes # (Auto)


 0.7 x10^3/uL


(0.0-1.1) 0.8 x10^3/uL


(0.0-1.1) 





 


Eosinophils # (Auto)


 0.3 x10^3/uL


(0.0-0.7) 0.3 x10^3/uL


(0.0-0.7) 





 


Basophils # (Auto)


 0.0 x10^3/uL


(0.0-0.2) 0.0 x10^3/uL


(0.0-0.2) 





 


Sodium Level


 140 mmol/L


(136-145) 


 141 mmol/L


(136-145)


 


Potassium Level


 3.4 mmol/L


(3.5-5.1) 


 4.1 mmol/L


(3.5-5.1)


 


Chloride Level


 104 mmol/L


() 


 106 mmol/L


()


 


Carbon Dioxide Level


 27 mmol/L


(21-32) 


 26 mmol/L


(21-32)


 


Anion Gap 9 (6-14)   9 (6-14) 


 


Blood Urea Nitrogen


 12 mg/dL


(8-26) 


 10 mg/dL


(8-26)


 


Creatinine


 0.9 mg/dL


(0.7-1.3) 


 1.0 mg/dL


(0.7-1.3)


 


Estimated GFR


(Cockcroft-Gault) 81.6 


 


 72.3 





 


BUN/Creatinine Ratio 13 (6-20)   


 


Glucose Level


 127 mg/dL


(70-99) 


 80 mg/dL


(70-99)


 


Calcium Level


 8.4 mg/dL


(8.5-10.1) 


 8.5 mg/dL


(8.5-10.1)


 


Total Bilirubin


 1.0 mg/dL


(0.2-1.0) 


 





 


Aspartate Amino Transf


(AST/SGOT) 22 U/L (15-37) 


 


 





 


Alanine Aminotransferase


(ALT/SGPT) 17 U/L (16-63) 


 


 





 


Alkaline Phosphatase


 78 U/L


() 


 





 


Total Protein


 6.3 g/dL


(6.4-8.2) 


 





 


Albumin


 2.7 g/dL


(3.4-5.0) 


 





 


Albumin/Globulin Ratio 0.8 (1.0-1.7)   


 


Reticulocyte Count (auto)


 


 0.9 %


(0.5-2.5) 





 


Prothrombin Time


 


 15.0 SEC


(11.7-14.0) 





 


Prothromb Time International


Ratio 


 1.2 (0.8-1.1) 


 











Laboratory Tests








Test


 12/24/18


06:05 12/24/18


06:50


 


White Blood Count


 6.1 x10^3/uL


(4.0-11.0) 





 


Red Blood Count


 5.59 x10^6/uL


(4.30-5.70) 





 


Hemoglobin


 18.0 g/dL


(13.0-17.5) 





 


Hematocrit


 53.1 %


(39.0-53.0) 





 


Mean Corpuscular Volume 95 fL ()  


 


Mean Corpuscular Hemoglobin 32 pg (25-35)  


 


Mean Corpuscular Hemoglobin


Concent 34 g/dL


(31-37) 





 


Red Cell Distribution Width


 16.3 %


(11.5-14.5) 





 


Platelet Count


 145 x10^3/uL


(140-400) 





 


Neutrophils (%) (Auto) 63 % (31-73)  


 


Lymphocytes (%) (Auto) 18 % (24-48)  


 


Monocytes (%) (Auto) 13 % (0-9)  


 


Eosinophils (%) (Auto) 5 % (0-3)  


 


Basophils (%) (Auto) 1 % (0-3)  


 


Neutrophils # (Auto)


 3.8 x10^3uL


(1.8-7.7) 





 


Lymphocytes # (Auto)


 1.1 x10^3/uL


(1.0-4.8) 





 


Monocytes # (Auto)


 0.8 x10^3/uL


(0.0-1.1) 





 


Eosinophils # (Auto)


 0.3 x10^3/uL


(0.0-0.7) 





 


Basophils # (Auto)


 0.0 x10^3/uL


(0.0-0.2) 





 


Reticulocyte Count (auto)


 0.9 %


(0.5-2.5) 





 


Prothrombin Time


 15.0 SEC


(11.7-14.0) 





 


Prothromb Time International


Ratio 1.2 (0.8-1.1) 


 





 


Sodium Level


 


 141 mmol/L


(136-145)


 


Potassium Level


 


 4.1 mmol/L


(3.5-5.1)


 


Chloride Level


 


 106 mmol/L


()


 


Carbon Dioxide Level


 


 26 mmol/L


(21-32)


 


Anion Gap  9 (6-14) 


 


Blood Urea Nitrogen


 


 10 mg/dL


(8-26)


 


Creatinine


 


 1.0 mg/dL


(0.7-1.3)


 


Estimated GFR


(Cockcroft-Gault) 


 72.3 





 


Glucose Level


 


 80 mg/dL


(70-99)


 


Calcium Level


 


 8.5 mg/dL


(8.5-10.1)








Microbiology


12/19/18 Blood Culture - Preliminary, Resulted


           NO GROWTH AFTER 4 DAYS


12/22/18 Anaerobic/Aerobic Culture, Resulted


           Pending


12/22/18 Anaerobic Culture Result 1 ([facility]), Resulted


           Pending


12/22/18 Aerobic Culture, Resulted


           Pending


12/22/18 Aerobic Culture Result 1 ([facility]), Resulted


           Pending


12/22/18 Gram Stain - Final, Resulted


           


12/22/18 Gram Stain Result 1 ([facility]) - Final, Resulted


           


12/22/18 Gram Stain Result 2 ([facility]) - Final, Resulted


Medications





Current Medications


Sodium Chloride 1,000 ml @  1,000 mls/hr 1X  ONCE IV  Last administered on 12/19 /18at 19:19;  Start 12/19/18 at 19:00;  Stop 12/19/18 at 19:59;  Status DC


Vancomycin HCl (Vanco Per Pharmacy) 1 each PRN DAILY  PRN MC SEE COMMENTS Last 

administered on 12/24/18at 12:02;  Start 12/19/18 at 19:45


Vancomycin HCl 2 gm/Sodium Chloride 500 ml @  250 mls/hr ONCE  ONCE IV  Last 

administered on 12/19/18at 20:00;  Start 12/19/18 at 19:45;  Stop 12/19/18 at 21

:44;  Status DC


Ondansetron HCl (Zofran) 4 mg PRN Q8HRS  PRN IV NAUSEA/VOMITING 1ST CHOICE;  

Start 12/19/18 at 21:15;  Stop 12/20/18 at 21:14;  Status DC


Sodium Chloride 1,000 ml @  125 mls/hr Q8H IV  Last administered on 12/20/18at 

20:01;  Start 12/19/18 at 21:03;  Stop 12/20/18 at 21:02;  Status DC


Fentanyl Citrate (Fentanyl 2ml Vial) 25 mcg PRN Q2HR  PRN IV SEVERE PAIN Last 

administered on 12/20/18at 13:59;  Start 12/20/18 at 01:45;  Stop 12/20/18 at 14

:39;  Status DC


Vancomycin HCl 1.25 gm/Sodium Chloride 250 ml @  167 mls/hr Q12H IV  Last 

administered on 12/23/18at 20:04;  Start 12/20/18 at 08:00


Vancomycin HCl (Vancomycin Trough Level) 1 each 1X  ONCE MC ;  Start 12/22/18 

at 07:30;  Stop 12/22/18 at 07:30;  Status DC


Lactobacillus Rhamnosus (Culturelle) 1 cap BID PO  Last administered on 12/23/ 18at 20:05;  Start 12/20/18 at 09:00


Fentanyl Citrate (Fentanyl 2ml Vial) 50 mcg PRN Q2HR  PRN IV SEVERE PAIN Last 

administered on 12/21/18at 09:05;  Start 12/20/18 at 14:45


Apixaban (Eliquis) 5 mg BID PO  Last administered on 12/23/18at 09:39;  Start 12 /20/18 at 15:00;  Stop 12/24/18 at 11:45;  Status DC


Acetaminophen/ Hydrocodone Bitart (Lortab 7.5/325) 1 tab PRN Q4HRS  PRN PO PAIN 

Last administered on 12/24/18at 05:27;  Start 12/21/18 at 11:15


Info (Anti-Coagulation Monitoring By Pharmacy) 1 each PRN DAILY  PRN MC SEE 

COMMENTS Last administered on 12/23/18at 13:08;  Start 12/21/18 at 11:30;  Stop 

12/24/18 at 11:45;  Status DC


Magnesium Hydroxide (Milk Of Magnesia) 2,400 mg PRN DAILY  PRN PO CONSTIPATION 

Last administered on 12/22/18at 20:01;  Start 12/21/18 at 14:15


Iohexol (Omnipaque 300 Mg/ml) 100 ml 1X  ONCE IV  Last administered on 12/22/ 18at 11:45;  Start 12/22/18 at 11:45;  Stop 12/22/18 at 11:46;  Status DC


Info (CONTRAST GIVEN -- Rx MONITORING) 1 each PRN DAILY  PRN MC SEE COMMENTS;  

Start 12/22/18 at 11:45;  Stop 12/24/18 at 11:33;  Status DC


Piperacillin Sod/ Tazobactam Sod 3.375 gm/Sodium Chloride 50 ml @  100 mls/hr 

Q6HRS IV  Last administered on 12/24/18at 05:02;  Start 12/22/18 at 18:30


Potassium Chloride (Klor-Con) 40 meq 1X  ONCE PO  Last administered on 12/23/ 18at 17:44;  Start 12/23/18 at 14:30;  Stop 12/23/18 at 14:31;  Status DC


Potassium Chloride (Klor-Con) 20 meq DAILYWBKFT PO ;  Start 12/24/18 at 08:00


Iohexol (Omnipaque 300 Mg/ml) 95 ml 1X  ONCE IV  Last administered on 12/23/ 18at 15:09;  Start 12/23/18 at 14:45;  Stop 12/23/18 at 14:47;  Status DC


Info (CONTRAST GIVEN -- Rx MONITORING) 1 each PRN DAILY  PRN MC SEE COMMENTS;  

Start 12/23/18 at 15:00;  Stop 12/25/18 at 14:59


Regadenoson (Lexiscan) 0.4 mg 1X  ONCE IV  Last administered on 12/24/18at 08:30

;  Start 12/24/18 at 08:30;  Stop 12/24/18 at 08:31;  Status DC


Midazolam HCl (Versed) 2 mg STK-MED ONCE .ROUTE ;  Start 12/24/18 at 09:55;  

Stop 12/24/18 at 09:56;  Status DC


Fentanyl Citrate (Fentanyl 2ml Vial) 100 mcg STK-MED ONCE .ROUTE ;  Start 12/24/ 18 at 09:55;  Stop 12/24/18 at 09:56;  Status DC


Lidocaine/Sodium Bicarbonate (Buffered Lidocaine 1%) 3 ml STK-MED ONCE .ROUTE ;

  Start 12/24/18 at 09:57;  Stop 12/24/18 at 09:58;  Status DC


Lidocaine/Sodium Bicarbonate (Buffered Lidocaine 1%) 3 ml 1X  ONCE IJ  Last 

administered on 12/24/18at 10:25;  Start 12/24/18 at 10:00;  Stop 12/24/18 at 10

:12;  Status DC


Midazolam HCl (Versed) 2 mg 1X  ONCE IV  Last administered on 12/24/18at 10:26;

  Start 12/24/18 at 10:00;  Stop 12/24/18 at 10:12;  Status DC


Fentanyl Citrate (Fentanyl 2ml Vial) 100 mcg 1X  ONCE IV  Last administered on 

12/24/18at 10:27;  Start 12/24/18 at 10:00;  Stop 12/24/18 at 10:12;  Status DC





Active Scripts


Active


Reported


Eliquis (Apixaban) 5 Mg Tablet 5 Mg PO BID


Vitals/I & O





Vital Sign - Last 24 Hours








 12/23/18 12/23/18 12/23/18 12/23/18





 15:28 19:00 20:00 20:07


 


Temp 98.2 98.1  





 98.2 98.1  


 


Pulse 75 71  


 


Resp 20 17  18


 


B/P (MAP) 100/50 (67) 125/47 (73)  


 


Pulse Ox 94 94  94


 


O2 Delivery Room Air Room Air Room Air Room Air


 


O2 Flow Rate    2.0


 


    





    





 12/23/18 12/24/18 12/24/18 12/24/18





 23:00 01:14 03:00 05:27


 


Temp 98.2  98.1 





 98.2  98.1 


 


Pulse 77  75 


 


Resp  18 16 18


 


B/P (MAP) 105/48 (67)  102/52 (69) 


 


Pulse Ox 93 93 93 93


 


O2 Delivery Room Air Room Air Room Air Room Air


 


O2 Flow Rate    2.0


 


    





    





 12/24/18 12/24/18 12/24/18 12/24/18





 06:27 07:00 07:45 10:16


 


Temp  98.1  





  98.1  


 


Pulse  68  84


 


Resp 18 18  16


 


B/P (MAP)  113/70 (84)  


 


Pulse Ox 93 92  98


 


O2 Delivery Room Air Room Air Room Air Nasal Cannula


 


O2 Flow Rate   2.0 2.0


 


    





    





 12/24/18 12/24/18 12/24/18 12/24/18





 10:21 10:27 10:28 11:00


 


Temp    97.2





    97.2


 


Pulse 93  85 76


 


Resp 14 14 16 18


 


B/P (MAP)    101/59 (73)


 


Pulse Ox 97 96 96 93


 


O2 Delivery Nasal Cannula Nasal Cannula Nasal Cannula Room Air


 


O2 Flow Rate 2.0 2.0 2.0 














Intake and Output   


 


 12/23/18 12/23/18 12/24/18





 15:01 23:01 07:01


 


Intake Total  300 ml 100 ml


 


Output Total 100 ml  1200 ml


 


Balance -100 ml 300 ml -1100 ml

















SHIRLEY DEMARCO MD Dec 24, 2018 12:47

## 2018-12-25 VITALS — SYSTOLIC BLOOD PRESSURE: 106 MMHG | DIASTOLIC BLOOD PRESSURE: 78 MMHG

## 2018-12-25 VITALS — SYSTOLIC BLOOD PRESSURE: 104 MMHG | DIASTOLIC BLOOD PRESSURE: 56 MMHG

## 2018-12-25 VITALS — SYSTOLIC BLOOD PRESSURE: 111 MMHG | DIASTOLIC BLOOD PRESSURE: 66 MMHG

## 2018-12-25 VITALS — DIASTOLIC BLOOD PRESSURE: 64 MMHG | SYSTOLIC BLOOD PRESSURE: 133 MMHG

## 2018-12-25 VITALS — DIASTOLIC BLOOD PRESSURE: 59 MMHG | SYSTOLIC BLOOD PRESSURE: 120 MMHG

## 2018-12-25 VITALS — SYSTOLIC BLOOD PRESSURE: 113 MMHG | DIASTOLIC BLOOD PRESSURE: 72 MMHG

## 2018-12-25 LAB
BASOPHILS # BLD AUTO: 0 X10^3/UL (ref 0–0.2)
BASOPHILS NFR BLD: 1 % (ref 0–3)
EOSINOPHIL NFR BLD: 0.3 X10^3/UL (ref 0–0.7)
EOSINOPHIL NFR BLD: 6 % (ref 0–3)
ERYTHROCYTE [DISTWIDTH] IN BLOOD BY AUTOMATED COUNT: 16.8 % (ref 11.5–14.5)
HCT VFR BLD CALC: 53.8 % (ref 39–53)
HGB BLD-MCNC: 18.4 G/DL (ref 13–17.5)
LYMPHOCYTES # BLD: 1 X10^3/UL (ref 1–4.8)
LYMPHOCYTES NFR BLD AUTO: 17 % (ref 24–48)
MCH RBC QN AUTO: 32 PG (ref 25–35)
MCHC RBC AUTO-ENTMCNC: 34 G/DL (ref 31–37)
MCV RBC AUTO: 95 FL (ref 79–100)
MONO #: 0.8 X10^3/UL (ref 0–1.1)
MONOCYTES NFR BLD: 14 % (ref 0–9)
NEUT #: 3.8 X10^3UL (ref 1.8–7.7)
NEUTROPHILS NFR BLD AUTO: 63 % (ref 31–73)
PLATELET # BLD AUTO: 146 X10^3/UL (ref 140–400)
RBC # BLD AUTO: 5.69 X10^6/UL (ref 4.3–5.7)
WBC # BLD AUTO: 6 X10^3/UL (ref 4–11)

## 2018-12-25 RX ADMIN — PIPERACILLIN SODIUM AND TAZOBACTAM SODIUM SCH MLS/HR: 3; .375 INJECTION, POWDER, LYOPHILIZED, FOR SOLUTION INTRAVENOUS at 00:06

## 2018-12-25 RX ADMIN — Medication SCH CAP: at 08:50

## 2018-12-25 RX ADMIN — POTASSIUM CHLORIDE SCH MEQ: 1500 TABLET, EXTENDED RELEASE ORAL at 08:50

## 2018-12-25 RX ADMIN — PIPERACILLIN SODIUM AND TAZOBACTAM SODIUM SCH MLS/HR: 3; .375 INJECTION, POWDER, LYOPHILIZED, FOR SOLUTION INTRAVENOUS at 23:40

## 2018-12-25 RX ADMIN — PIPERACILLIN SODIUM AND TAZOBACTAM SODIUM SCH MLS/HR: 3; .375 INJECTION, POWDER, LYOPHILIZED, FOR SOLUTION INTRAVENOUS at 06:36

## 2018-12-25 RX ADMIN — MAGNESIUM HYDROXIDE PRN MG: 400 SUSPENSION ORAL at 21:09

## 2018-12-25 RX ADMIN — VANCOMYCIN HYDROCHLORIDE SCH MLS/HR: 1 INJECTION, POWDER, FOR SOLUTION INTRAVENOUS at 21:02

## 2018-12-25 RX ADMIN — PIPERACILLIN SODIUM AND TAZOBACTAM SODIUM SCH MLS/HR: 3; .375 INJECTION, POWDER, LYOPHILIZED, FOR SOLUTION INTRAVENOUS at 12:27

## 2018-12-25 RX ADMIN — Medication SCH CAP: at 21:03

## 2018-12-25 RX ADMIN — VANCOMYCIN HYDROCHLORIDE SCH MLS/HR: 1 INJECTION, POWDER, FOR SOLUTION INTRAVENOUS at 08:49

## 2018-12-25 RX ADMIN — ASPIRIN SCH MG: 81 TABLET, COATED ORAL at 08:49

## 2018-12-25 RX ADMIN — VANCOMYCIN HYDROCHLORIDE PRN EACH: 1 INJECTION, POWDER, LYOPHILIZED, FOR SOLUTION INTRAVENOUS at 09:44

## 2018-12-25 RX ADMIN — PIPERACILLIN SODIUM AND TAZOBACTAM SODIUM SCH MLS/HR: 3; .375 INJECTION, POWDER, LYOPHILIZED, FOR SOLUTION INTRAVENOUS at 17:15

## 2018-12-25 RX ADMIN — ATORVASTATIN CALCIUM SCH MG: 40 TABLET, FILM COATED ORAL at 21:02

## 2018-12-25 NOTE — PDOC
PROGRESS NOTES


Chief Complaint


Chief Complaint


1.  Severe bilateral PAD with RLE wound/ulceration. CTA showing extensive 

disease. Vascular surgery following; aortofemoral reconstruction planned 

pending further workup. Echo showed preserved LV systolic function. 


2.  RLE cellulitis


3.  Hypertension; low normotensive


4.  Hyperlipidemia


5.  H/o PE; tx with Eliquis. CTA with resolution


6.  Polycythemia; bone marrow biopsy 12/24


7. Dry skin





History of Present Illness


History of Present Illness


Extensive specialists notes reviewed


Had phlebotomy by Dr. Lupe Ulloa in the past


Just had bone marrow biopsy yesterday 12/24/18 for polycythemia workup


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








VAs surgery has plans of PAD reconstruction/recirculation for extensive 

arterial occlusion on both legs. Patient does have claudication pain


Otherwise patient has no complaints


Patient lives alone at home with no assistive device of ambulation





Plan: CPM


Await vas surgery plans of revascularization of bilateral arterial occlusion


Cardiac wise doing well, has preserved EF


Mentions a polycythemia vera on note-hemoglobin is now normal, from hematocrit 

of 20 on admission


dw RN at bedside


Did well with PT - no PT needs so far


[place] of eucerin for severe dry skin





Vitals


Vitals





Vital Signs








  Date Time  Temp Pulse Resp B/P (MAP) Pulse Ox O2 Delivery O2 Flow Rate FiO2


 


12/25/18 07:00 98.3 67 18 104/56 (72) 92 Room Air  





 98.3       


 


12/24/18 11:00       2.0 











Physical Exam


Physical Exam


GENERAL:  Resting quietly 


HEENT:  Normal conjunctivae.  Oral cavity:  Pharynx pink and moist.


NECK:  Supple.


LUNGS:  Clear to auscultation.


HEART:  S1, S2.


ABDOMEN:  Nondistended, soft and nontender with bowel sounds present.


EXTREMITIES:  No gross edema or cyanosis.  Right lower extremity rubor with


wrinkles and dry flaky skin.  He has 2 ulcers located anterolateral aspect of


the shin and dorsal aspect of the right foot with bleeding.  Dorsalis pedis


pulse difficult to palpate.  His toe nails are thick and yellow.


SKIN:  Warm without rash.


NEUROLOGIC:  Alert and oriented x 3.


General:  Alert, Oriented X3, No acute distress


Heart:  Regular rate, Normal S1, Normal S2, No murmurs


Lungs:  Clear


Abdomen:  Normal bowel sounds, Soft, No tenderness


Extremities:  No cyanosis, Other (ISCHEMIC ULCER RIGHT FOOT/ CELLULITIS DEEP 

DORSAL FOOT WOUND)


Skin:  Other (MARKED VASCULAR INSUFF CHANGES RIGHT LOWER LEG, SKIN DRY,FRAGILE)





Labs


LABS





Laboratory Tests








Test


 12/25/18


02:40


 


White Blood Count


 6.0 x10^3/uL


(4.0-11.0)


 


Red Blood Count


 5.69 x10^6/uL


(4.30-5.70)


 


Hemoglobin


 18.4 g/dL


(13.0-17.5)


 


Hematocrit


 53.8 %


(39.0-53.0)


 


Mean Corpuscular Volume 95 fL () 


 


Mean Corpuscular Hemoglobin 32 pg (25-35) 


 


Mean Corpuscular Hemoglobin


Concent 34 g/dL


(31-37)


 


Red Cell Distribution Width


 16.8 %


(11.5-14.5)


 


Platelet Count


 146 x10^3/uL


(140-400)


 


Neutrophils (%) (Auto) 63 % (31-73) 


 


Lymphocytes (%) (Auto) 17 % (24-48) 


 


Monocytes (%) (Auto) 14 % (0-9) 


 


Eosinophils (%) (Auto) 6 % (0-3) 


 


Basophils (%) (Auto) 1 % (0-3) 


 


Neutrophils # (Auto)


 3.8 x10^3uL


(1.8-7.7)


 


Lymphocytes # (Auto)


 1.0 x10^3/uL


(1.0-4.8)


 


Monocytes # (Auto)


 0.8 x10^3/uL


(0.0-1.1)


 


Eosinophils # (Auto)


 0.3 x10^3/uL


(0.0-0.7)


 


Basophils # (Auto)


 0.0 x10^3/uL


(0.0-0.2)











Review of Systems


Review of Systems


claudication, otherwise the rest of ROS 14 point negative





Assessment and Plan


Assessmemt and Plan


Problems


Medical Problems:


(1) Cellulitis of right lower leg


Status: Acute  





(2) Right foot ulcer


Status: Acute  











Comment


Review of Relevant


I have reviewed the following items karen (where applicable) has been applied.


Labs





Laboratory Tests








Test


 12/23/18


09:15 12/24/18


06:05 12/24/18


06:50 12/25/18


02:40


 


White Blood Count


 6.5 x10^3/uL


(4.0-11.0) 6.1 x10^3/uL


(4.0-11.0) 


 6.0 x10^3/uL


(4.0-11.0)


 


Red Blood Count


 5.74 x10^6/uL


(4.30-5.70) 5.59 x10^6/uL


(4.30-5.70) 


 5.69 x10^6/uL


(4.30-5.70)


 


Hemoglobin


 18.4 g/dL


(13.0-17.5) 18.0 g/dL


(13.0-17.5) 


 18.4 g/dL


(13.0-17.5)


 


Hematocrit


 54.6 %


(39.0-53.0) 53.1 %


(39.0-53.0) 


 53.8 %


(39.0-53.0)


 


Mean Corpuscular Volume 95 fL ()  95 fL ()   95 fL () 


 


Mean Corpuscular Hemoglobin 32 pg (25-35)  32 pg (25-35)   32 pg (25-35) 


 


Mean Corpuscular Hemoglobin


Concent 34 g/dL


(31-37) 34 g/dL


(31-37) 


 34 g/dL


(31-37)


 


Red Cell Distribution Width


 16.7 %


(11.5-14.5) 16.3 %


(11.5-14.5) 


 16.8 %


(11.5-14.5)


 


Platelet Count


 138 x10^3/uL


(140-400) 145 x10^3/uL


(140-400) 


 146 x10^3/uL


(140-400)


 


Neutrophils (%) (Auto) 70 % (31-73)  63 % (31-73)   63 % (31-73) 


 


Lymphocytes (%) (Auto) 14 % (24-48)  18 % (24-48)   17 % (24-48) 


 


Monocytes (%) (Auto) 11 % (0-9)  13 % (0-9)   14 % (0-9) 


 


Eosinophils (%) (Auto) 4 % (0-3)  5 % (0-3)   6 % (0-3) 


 


Basophils (%) (Auto) 1 % (0-3)  1 % (0-3)   1 % (0-3) 


 


Neutrophils # (Auto)


 4.5 x10^3uL


(1.8-7.7) 3.8 x10^3uL


(1.8-7.7) 


 3.8 x10^3uL


(1.8-7.7)


 


Lymphocytes # (Auto)


 0.9 x10^3/uL


(1.0-4.8) 1.1 x10^3/uL


(1.0-4.8) 


 1.0 x10^3/uL


(1.0-4.8)


 


Monocytes # (Auto)


 0.7 x10^3/uL


(0.0-1.1) 0.8 x10^3/uL


(0.0-1.1) 


 0.8 x10^3/uL


(0.0-1.1)


 


Eosinophils # (Auto)


 0.3 x10^3/uL


(0.0-0.7) 0.3 x10^3/uL


(0.0-0.7) 


 0.3 x10^3/uL


(0.0-0.7)


 


Basophils # (Auto)


 0.0 x10^3/uL


(0.0-0.2) 0.0 x10^3/uL


(0.0-0.2) 


 0.0 x10^3/uL


(0.0-0.2)


 


Sodium Level


 140 mmol/L


(136-145) 


 141 mmol/L


(136-145) 





 


Potassium Level


 3.4 mmol/L


(3.5-5.1) 


 4.1 mmol/L


(3.5-5.1) 





 


Chloride Level


 104 mmol/L


() 


 106 mmol/L


() 





 


Carbon Dioxide Level


 27 mmol/L


(21-32) 


 26 mmol/L


(21-32) 





 


Anion Gap 9 (6-14)   9 (6-14)  


 


Blood Urea Nitrogen


 12 mg/dL


(8-26) 


 10 mg/dL


(8-26) 





 


Creatinine


 0.9 mg/dL


(0.7-1.3) 


 1.0 mg/dL


(0.7-1.3) 





 


Estimated GFR


(Cockcroft-Gault) 81.6 


 


 72.3 


 





 


BUN/Creatinine Ratio 13 (6-20)    


 


Glucose Level


 127 mg/dL


(70-99) 


 80 mg/dL


(70-99) 





 


Calcium Level


 8.4 mg/dL


(8.5-10.1) 


 8.5 mg/dL


(8.5-10.1) 





 


Total Bilirubin


 1.0 mg/dL


(0.2-1.0) 


 


 





 


Aspartate Amino Transf


(AST/SGOT) 22 U/L (15-37) 


 


 


 





 


Alanine Aminotransferase


(ALT/SGPT) 17 U/L (16-63) 


 


 


 





 


Alkaline Phosphatase


 78 U/L


() 


 


 





 


Total Protein


 6.3 g/dL


(6.4-8.2) 


 


 





 


Albumin


 2.7 g/dL


(3.4-5.0) 


 


 





 


Albumin/Globulin Ratio 0.8 (1.0-1.7)    


 


Reticulocyte Count (auto)


 


 0.9 %


(0.5-2.5) 


 





 


Prothrombin Time


 


 15.0 SEC


(11.7-14.0) 


 





 


Prothromb Time International


Ratio 


 1.2 (0.8-1.1) 


 


 











Laboratory Tests








Test


 12/25/18


02:40


 


White Blood Count


 6.0 x10^3/uL


(4.0-11.0)


 


Red Blood Count


 5.69 x10^6/uL


(4.30-5.70)


 


Hemoglobin


 18.4 g/dL


(13.0-17.5)


 


Hematocrit


 53.8 %


(39.0-53.0)


 


Mean Corpuscular Volume 95 fL () 


 


Mean Corpuscular Hemoglobin 32 pg (25-35) 


 


Mean Corpuscular Hemoglobin


Concent 34 g/dL


(31-37)


 


Red Cell Distribution Width


 16.8 %


(11.5-14.5)


 


Platelet Count


 146 x10^3/uL


(140-400)


 


Neutrophils (%) (Auto) 63 % (31-73) 


 


Lymphocytes (%) (Auto) 17 % (24-48) 


 


Monocytes (%) (Auto) 14 % (0-9) 


 


Eosinophils (%) (Auto) 6 % (0-3) 


 


Basophils (%) (Auto) 1 % (0-3) 


 


Neutrophils # (Auto)


 3.8 x10^3uL


(1.8-7.7)


 


Lymphocytes # (Auto)


 1.0 x10^3/uL


(1.0-4.8)


 


Monocytes # (Auto)


 0.8 x10^3/uL


(0.0-1.1)


 


Eosinophils # (Auto)


 0.3 x10^3/uL


(0.0-0.7)


 


Basophils # (Auto)


 0.0 x10^3/uL


(0.0-0.2)








Microbiology


12/19/18 Blood Culture - Final, Complete


           NO GROWTH AFTER 5 DAYS


12/22/18 Anaerobic/Aerobic Culture, Resulted


           Pending


12/22/18 Anaerobic Culture Result 1 ([facility]), Resulted


           Pending


12/22/18 Aerobic Culture, Resulted


           Pending


12/22/18 Aerobic Culture Result 1 ([facility]), Resulted


           Pending


12/22/18 Gram Stain - Final, Resulted


           


12/22/18 Gram Stain Result 1 ([facility]) - Final, Resulted


           


12/22/18 Gram Stain Result 2 ([facility]) - Final, Resulted


Medications





Current Medications


Sodium Chloride 1,000 ml @  1,000 mls/hr 1X  ONCE IV  Last administered on 12/19 /18at 19:19;  Start 12/19/18 at 19:00;  Stop 12/19/18 at 19:59;  Status DC


Vancomycin HCl (Vanco Per Pharmacy) 1 each PRN DAILY  PRN MC SEE COMMENTS Last 

administered on 12/24/18at 12:02;  Start 12/19/18 at 19:45


Vancomycin HCl 2 gm/Sodium Chloride 500 ml @  250 mls/hr ONCE  ONCE IV  Last 

administered on 12/19/18at 20:00;  Start 12/19/18 at 19:45;  Stop 12/19/18 at 21

:44;  Status DC


Ondansetron HCl (Zofran) 4 mg PRN Q8HRS  PRN IV NAUSEA/VOMITING 1ST CHOICE;  

Start 12/19/18 at 21:15;  Stop 12/20/18 at 21:14;  Status DC


Sodium Chloride 1,000 ml @  125 mls/hr Q8H IV  Last administered on 12/20/18at 

20:01;  Start 12/19/18 at 21:03;  Stop 12/20/18 at 21:02;  Status DC


Fentanyl Citrate (Fentanyl 2ml Vial) 25 mcg PRN Q2HR  PRN IV SEVERE PAIN Last 

administered on 12/20/18at 13:59;  Start 12/20/18 at 01:45;  Stop 12/20/18 at 14

:39;  Status DC


Vancomycin HCl 1.25 gm/Sodium Chloride 250 ml @  167 mls/hr Q12H IV  Last 

administered on 12/24/18at 20:11;  Start 12/20/18 at 08:00


Vancomycin HCl (Vancomycin Trough Level) 1 each 1X  ONCE MC ;  Start 12/22/18 

at 07:30;  Stop 12/22/18 at 07:30;  Status DC


Lactobacillus Rhamnosus (Culturelle) 1 cap BID PO  Last administered on 12/24/ 18at 22:10;  Start 12/20/18 at 09:00


Fentanyl Citrate (Fentanyl 2ml Vial) 50 mcg PRN Q2HR  PRN IV SEVERE PAIN Last 

administered on 12/21/18at 09:05;  Start 12/20/18 at 14:45


Apixaban (Eliquis) 5 mg BID PO  Last administered on 12/23/18at 09:39;  Start 12 /20/18 at 15:00;  Stop 12/24/18 at 11:45;  Status DC


Acetaminophen/ Hydrocodone Bitart (Lortab 7.5/325) 1 tab PRN Q4HRS  PRN PO PAIN 

Last administered on 12/24/18at 05:27;  Start 12/21/18 at 11:15


Info (Anti-Coagulation Monitoring By Pharmacy) 1 each PRN DAILY  PRN MC SEE 

COMMENTS Last administered on 12/23/18at 13:08;  Start 12/21/18 at 11:30;  Stop 

12/24/18 at 11:45;  Status DC


Magnesium Hydroxide (Milk Of Magnesia) 2,400 mg PRN DAILY  PRN PO CONSTIPATION 

Last administered on 12/24/18at 20:09;  Start 12/21/18 at 14:15


Iohexol (Omnipaque 300 Mg/ml) 100 ml 1X  ONCE IV  Last administered on 12/22/ 18at 11:45;  Start 12/22/18 at 11:45;  Stop 12/22/18 at 11:46;  Status DC


Info (CONTRAST GIVEN -- Rx MONITORING) 1 each PRN DAILY  PRN MC SEE COMMENTS;  

Start 12/22/18 at 11:45;  Stop 12/24/18 at 11:33;  Status DC


Piperacillin Sod/ Tazobactam Sod 3.375 gm/Sodium Chloride 50 ml @  100 mls/hr 

Q6HRS IV  Last administered on 12/25/18at 06:36;  Start 12/22/18 at 18:30


Potassium Chloride (Klor-Con) 40 meq 1X  ONCE PO  Last administered on 12/23/ 18at 17:44;  Start 12/23/18 at 14:30;  Stop 12/23/18 at 14:31;  Status DC


Potassium Chloride (Klor-Con) 20 meq DAILYWBKFT PO  Last administered on 12/24/ 18at 12:54;  Start 12/24/18 at 08:00


Iohexol (Omnipaque 300 Mg/ml) 95 ml 1X  ONCE IV  Last administered on 12/23/ 18at 15:09;  Start 12/23/18 at 14:45;  Stop 12/23/18 at 14:47;  Status DC


Info (CONTRAST GIVEN -- Rx MONITORING) 1 each PRN DAILY  PRN MC SEE COMMENTS;  

Start 12/23/18 at 15:00;  Stop 12/25/18 at 14:59


Regadenoson (Lexiscan) 0.4 mg 1X  ONCE IV  Last administered on 12/24/18at 08:30

;  Start 12/24/18 at 08:30;  Stop 12/24/18 at 08:31;  Status DC


Midazolam HCl (Versed) 2 mg STK-MED ONCE .ROUTE ;  Start 12/24/18 at 09:55;  

Stop 12/24/18 at 09:56;  Status DC


Fentanyl Citrate (Fentanyl 2ml Vial) 100 mcg STK-MED ONCE .ROUTE ;  Start 12/24/ 18 at 09:55;  Stop 12/24/18 at 09:56;  Status DC


Lidocaine/Sodium Bicarbonate (Buffered Lidocaine 1%) 3 ml STK-MED ONCE .ROUTE ;

  Start 12/24/18 at 09:57;  Stop 12/24/18 at 09:58;  Status DC


Lidocaine/Sodium Bicarbonate (Buffered Lidocaine 1%) 3 ml 1X  ONCE IJ  Last 

administered on 12/24/18at 10:25;  Start 12/24/18 at 10:00;  Stop 12/24/18 at 10

:12;  Status DC


Midazolam HCl (Versed) 2 mg 1X  ONCE IV  Last administered on 12/24/18at 10:26;

  Start 12/24/18 at 10:00;  Stop 12/24/18 at 10:12;  Status DC


Fentanyl Citrate (Fentanyl 2ml Vial) 100 mcg 1X  ONCE IV  Last administered on 

12/24/18at 10:27;  Start 12/24/18 at 10:00;  Stop 12/24/18 at 10:12;  Status DC


Atorvastatin Calcium (Lipitor) 40 mg QHS PO  Last administered on 12/24/18at 22:

10;  Start 12/24/18 at 21:00


Aspirin (Ecotrin) 81 mg DAILYWBKFT PO ;  Start 12/25/18 at 08:00


Aspirin (Ecotrin) 325 mg 1X  ONCE PO  Last administered on 12/24/18at 14:15;  

Start 12/24/18 at 14:15;  Stop 12/24/18 at 14:16;  Status DC





Active Scripts


Active


Reported


Eliquis (Apixaban) 5 Mg Tablet 5 Mg PO BID


Vitals/I & O





Vital Sign - Last 24 Hours








 12/24/18 12/24/18 12/24/18 12/24/18





 10:16 10:21 10:27 10:28


 


Pulse 84 93  85


 


Resp 16 14 14 16


 


Pulse Ox 98 97 96 96


 


O2 Delivery Nasal Cannula Nasal Cannula Nasal Cannula Nasal Cannula


 


O2 Flow Rate 2.0 2.0 2.0 2.0





 12/24/18 12/24/18 12/24/18 12/24/18





 11:00 11:00 15:00 19:00


 


Temp  97.2  98.4





  97.2  98.4


 


Pulse  76 87 83


 


Resp  18 18 18


 


B/P (MAP)  101/59 (73) 120/94 (103) 114/61 (78)


 


Pulse Ox 93 93 92 93


 


O2 Delivery Room Air Room Air  


 


O2 Flow Rate 2.0   


 


    





    





 12/24/18 12/24/18 12/25/18 12/25/18





 20:30 23:00 03:00 07:00


 


Temp  98.1 98.1 98.3





  98.1 98.1 98.3


 


Pulse  63 73 67


 


Resp  20 20 18


 


B/P (MAP)  99/51 (67) 113/72 (86) 104/56 (72)


 


Pulse Ox  92 94 92


 


O2 Delivery Room Air   Room Air














Intake and Output   


 


 12/24/18 12/24/18 12/25/18





 15:01 23:01 07:01


 


Intake Total 0 ml  


 


Output Total 650 ml  200 ml


 


Balance -650 ml  -200 ml

















GONZALO MEDLEY MD Dec 25, 2018 08:54

## 2018-12-25 NOTE — PDOC
PULMONARY PROGRESS NOTES


Subjective


PT NOT MORE SOA


Vitals





Vital Signs








  Date Time  Temp Pulse Resp B/P (MAP) Pulse Ox O2 Delivery O2 Flow Rate FiO2


 


12/25/18 07:00 98.3 67 18 104/56 (72) 92 Room Air  





 98.3       


 


12/24/18 11:00       2.0 








ROS:  No Nausea, No Chest Pain, No Abdominal Pain, No Increase Cough


Lungs:  Clear


Cardiovascular:  S1, S2


Abdomen:  Soft


Neuro Exam:  Alert


Extremities:  No Edema


Skin:  Warm


Labs





Laboratory Tests








Test


 12/24/18


06:05 12/24/18


06:50 12/25/18


02:40


 


White Blood Count


 6.1 x10^3/uL


(4.0-11.0) 


 6.0 x10^3/uL


(4.0-11.0)


 


Red Blood Count


 5.59 x10^6/uL


(4.30-5.70) 


 5.69 x10^6/uL


(4.30-5.70)


 


Hemoglobin


 18.0 g/dL


(13.0-17.5) 


 18.4 g/dL


(13.0-17.5)


 


Hematocrit


 53.1 %


(39.0-53.0) 


 53.8 %


(39.0-53.0)


 


Mean Corpuscular Volume 95 fL ()   95 fL () 


 


Mean Corpuscular Hemoglobin 32 pg (25-35)   32 pg (25-35) 


 


Mean Corpuscular Hemoglobin


Concent 34 g/dL


(31-37) 


 34 g/dL


(31-37)


 


Red Cell Distribution Width


 16.3 %


(11.5-14.5) 


 16.8 %


(11.5-14.5)


 


Platelet Count


 145 x10^3/uL


(140-400) 


 146 x10^3/uL


(140-400)


 


Neutrophils (%) (Auto) 63 % (31-73)   63 % (31-73) 


 


Lymphocytes (%) (Auto) 18 % (24-48)   17 % (24-48) 


 


Monocytes (%) (Auto) 13 % (0-9)   14 % (0-9) 


 


Eosinophils (%) (Auto) 5 % (0-3)   6 % (0-3) 


 


Basophils (%) (Auto) 1 % (0-3)   1 % (0-3) 


 


Neutrophils # (Auto)


 3.8 x10^3uL


(1.8-7.7) 


 3.8 x10^3uL


(1.8-7.7)


 


Lymphocytes # (Auto)


 1.1 x10^3/uL


(1.0-4.8) 


 1.0 x10^3/uL


(1.0-4.8)


 


Monocytes # (Auto)


 0.8 x10^3/uL


(0.0-1.1) 


 0.8 x10^3/uL


(0.0-1.1)


 


Eosinophils # (Auto)


 0.3 x10^3/uL


(0.0-0.7) 


 0.3 x10^3/uL


(0.0-0.7)


 


Basophils # (Auto)


 0.0 x10^3/uL


(0.0-0.2) 


 0.0 x10^3/uL


(0.0-0.2)


 


Reticulocyte Count (auto)


 0.9 %


(0.5-2.5) 


 





 


Prothrombin Time


 15.0 SEC


(11.7-14.0) 


 





 


Prothromb Time International


Ratio 1.2 (0.8-1.1) 


 


 





 


Sodium Level


 


 141 mmol/L


(136-145) 





 


Potassium Level


 


 4.1 mmol/L


(3.5-5.1) 





 


Chloride Level


 


 106 mmol/L


() 





 


Carbon Dioxide Level


 


 26 mmol/L


(21-32) 





 


Anion Gap  9 (6-14)  


 


Blood Urea Nitrogen


 


 10 mg/dL


(8-26) 





 


Creatinine


 


 1.0 mg/dL


(0.7-1.3) 





 


Estimated GFR


(Cockcroft-Gault) 


 72.3 


 





 


Glucose Level


 


 80 mg/dL


(70-99) 





 


Calcium Level


 


 8.5 mg/dL


(8.5-10.1) 











Laboratory Tests








Test


 12/25/18


02:40


 


White Blood Count


 6.0 x10^3/uL


(4.0-11.0)


 


Red Blood Count


 5.69 x10^6/uL


(4.30-5.70)


 


Hemoglobin


 18.4 g/dL


(13.0-17.5)


 


Hematocrit


 53.8 %


(39.0-53.0)


 


Mean Corpuscular Volume 95 fL () 


 


Mean Corpuscular Hemoglobin 32 pg (25-35) 


 


Mean Corpuscular Hemoglobin


Concent 34 g/dL


(31-37)


 


Red Cell Distribution Width


 16.8 %


(11.5-14.5)


 


Platelet Count


 146 x10^3/uL


(140-400)


 


Neutrophils (%) (Auto) 63 % (31-73) 


 


Lymphocytes (%) (Auto) 17 % (24-48) 


 


Monocytes (%) (Auto) 14 % (0-9) 


 


Eosinophils (%) (Auto) 6 % (0-3) 


 


Basophils (%) (Auto) 1 % (0-3) 


 


Neutrophils # (Auto)


 3.8 x10^3uL


(1.8-7.7)


 


Lymphocytes # (Auto)


 1.0 x10^3/uL


(1.0-4.8)


 


Monocytes # (Auto)


 0.8 x10^3/uL


(0.0-1.1)


 


Eosinophils # (Auto)


 0.3 x10^3/uL


(0.0-0.7)


 


Basophils # (Auto)


 0.0 x10^3/uL


(0.0-0.2)








Medications





Active Scripts








 Medications  Dose


 Route/Sig


 Max Daily Dose Days Date Category


 


 Eliquis


  (Apixaban) 5 Mg


 Tablet  5 Mg


 PO BID


   3/1/18 Reported











Impression


.


IMPRESSION:


1.  History of pulmonary embolism.


2.  Chronic obstructive pulmonary disease.


3.  Tobacco dependence.


4.  Peripheral vascular disease.


5.  Chronic obstructive pulmonary disease.


6.  Ischemic ulceration of the right foot and right shin.














CT CHEST


IMPRESSION:


1. No PE.


2. No pneumonia.





Plan


.


RESP STATUS IS COMPENSATED


D/W DR GREY YESTERDAY POSSIBLE BONE MARROW


NO PE NO NEED FOR CONTINUED ANTICOAGULATION


IF SURGERY IS NEEDED OK BY TALIA STANTON MD Dec 25, 2018 11:27

## 2018-12-25 NOTE — PDOC
Provider Note


Provider Note


Pt will require aortobilateral femoral bypass, possible right fem pop bypass 

after


hematology evaluation completed. Hgb still 18.4. Bone marrow biopsy pending.











JACOB CAMARILLO II, MD Dec 25, 2018 11:41

## 2018-12-25 NOTE — PDOC
Infectious Disease Note


Subjective


Subjective


Feeling alright this morning


Denies pain/F/C/N/V/D/SOA





ROS


ROS


no n/v/d/





Vital Sign


Vital Signs





Vital Signs








  Date Time  Temp Pulse Resp B/P (MAP) Pulse Ox O2 Delivery O2 Flow Rate FiO2


 


12/25/18 07:00 98.3 67 18 104/56 (72) 92 Room Air  





 98.3       


 


12/24/18 11:00       2.0 











Physical Exam


PHYSICAL EXAM


GENERAL:  Resting quietly 


HEENT:  Normal conjunctivae.  Oral cavity:  Pharynx pink and moist.


NECK:  Supple.


LUNGS:  Clear to auscultation.


HEART:  S1, S2.


ABDOMEN:  Nondistended, soft and nontender with bowel sounds present.


EXTREMITIES:  No gross edema or cyanosis.  Right lower extremity rubor with


wrinkles and dry flaky skin.  He has 2 ulcers located anterolateral aspect of


the shin and dorsal aspect of the right foot with bleeding.  Dorsalis pedis


pulse difficult to palpate.  His toe nails are thick and yellow.


SKIN:  Warm without rash.


NEUROLOGIC:  Alert and oriented x 3.





Labs


Lab





Laboratory Tests








Test


 12/25/18


02:40


 


White Blood Count


 6.0 x10^3/uL


(4.0-11.0)


 


Red Blood Count


 5.69 x10^6/uL


(4.30-5.70)


 


Hemoglobin


 18.4 g/dL


(13.0-17.5)


 


Hematocrit


 53.8 %


(39.0-53.0)


 


Mean Corpuscular Volume 95 fL () 


 


Mean Corpuscular Hemoglobin 32 pg (25-35) 


 


Mean Corpuscular Hemoglobin


Concent 34 g/dL


(31-37)


 


Red Cell Distribution Width


 16.8 %


(11.5-14.5)


 


Platelet Count


 146 x10^3/uL


(140-400)


 


Neutrophils (%) (Auto) 63 % (31-73) 


 


Lymphocytes (%) (Auto) 17 % (24-48) 


 


Monocytes (%) (Auto) 14 % (0-9) 


 


Eosinophils (%) (Auto) 6 % (0-3) 


 


Basophils (%) (Auto) 1 % (0-3) 


 


Neutrophils # (Auto)


 3.8 x10^3uL


(1.8-7.7)


 


Lymphocytes # (Auto)


 1.0 x10^3/uL


(1.0-4.8)


 


Monocytes # (Auto)


 0.8 x10^3/uL


(0.0-1.1)


 


Eosinophils # (Auto)


 0.3 x10^3/uL


(0.0-0.7)


 


Basophils # (Auto)


 0.0 x10^3/uL


(0.0-0.2)








Micro


ANAEROBIC-AEROBIC CULTURE  


                                PENDING





  ANAEROBIC RES 1  


                                PENDING





  AEROBIC CULT  


                                PENDING





  AEROBIC RES 1  


                                PENDING





  GRAM STAIN  Final  


        Final report





  GRAM STAIN RES 1  Final  


        Comment





        No white blood cells seen.





  GRAM STAIN RES 2  Final  


        Comment





        Moderate number of gram negative diplococci.


        Performed at:  [facility] [address], [place], [place]  [id number]


        : MEME Jackson MD, Phone:  [phone]





Objective


Assessment


RLE ulcerations and cellulitis


Arterial occlusive disease, vascular following. May need aorto-fem bypass graft 

and possible right fem-pop-bypass graft 


h/o DVT/PE


Tobacco dependence 


COPD





Plan


Plan of Care


continue vanc and Zosyn


wound culture in process


Monitor labs/temp/renal function closely


Local wound care





surgery planned











MIKA SEGOVIA MD Dec 25, 2018 08:54

## 2018-12-26 VITALS — DIASTOLIC BLOOD PRESSURE: 57 MMHG | SYSTOLIC BLOOD PRESSURE: 98 MMHG

## 2018-12-26 VITALS — SYSTOLIC BLOOD PRESSURE: 116 MMHG | DIASTOLIC BLOOD PRESSURE: 60 MMHG

## 2018-12-26 VITALS — DIASTOLIC BLOOD PRESSURE: 70 MMHG | SYSTOLIC BLOOD PRESSURE: 111 MMHG

## 2018-12-26 VITALS — DIASTOLIC BLOOD PRESSURE: 68 MMHG | SYSTOLIC BLOOD PRESSURE: 118 MMHG

## 2018-12-26 VITALS — DIASTOLIC BLOOD PRESSURE: 59 MMHG | SYSTOLIC BLOOD PRESSURE: 111 MMHG

## 2018-12-26 VITALS — DIASTOLIC BLOOD PRESSURE: 68 MMHG | SYSTOLIC BLOOD PRESSURE: 120 MMHG

## 2018-12-26 LAB — VANC TR: 20.1 MCG/ML (ref 10–20)

## 2018-12-26 RX ADMIN — VANCOMYCIN HYDROCHLORIDE PRN EACH: 1 INJECTION, POWDER, LYOPHILIZED, FOR SOLUTION INTRAVENOUS at 08:29

## 2018-12-26 RX ADMIN — PIPERACILLIN SODIUM AND TAZOBACTAM SODIUM SCH MLS/HR: 3; .375 INJECTION, POWDER, LYOPHILIZED, FOR SOLUTION INTRAVENOUS at 23:50

## 2018-12-26 RX ADMIN — ASPIRIN SCH MG: 81 TABLET, COATED ORAL at 10:21

## 2018-12-26 RX ADMIN — PIPERACILLIN SODIUM AND TAZOBACTAM SODIUM SCH MLS/HR: 3; .375 INJECTION, POWDER, LYOPHILIZED, FOR SOLUTION INTRAVENOUS at 05:48

## 2018-12-26 RX ADMIN — VANCOMYCIN HYDROCHLORIDE PRN EACH: 1 INJECTION, POWDER, LYOPHILIZED, FOR SOLUTION INTRAVENOUS at 12:35

## 2018-12-26 RX ADMIN — ATORVASTATIN CALCIUM SCH MG: 40 TABLET, FILM COATED ORAL at 20:11

## 2018-12-26 RX ADMIN — Medication SCH CAP: at 20:11

## 2018-12-26 RX ADMIN — PIPERACILLIN SODIUM AND TAZOBACTAM SODIUM SCH MLS/HR: 3; .375 INJECTION, POWDER, LYOPHILIZED, FOR SOLUTION INTRAVENOUS at 12:24

## 2018-12-26 RX ADMIN — POTASSIUM CHLORIDE SCH MEQ: 1500 TABLET, EXTENDED RELEASE ORAL at 10:21

## 2018-12-26 RX ADMIN — VANCOMYCIN HYDROCHLORIDE SCH MLS/HR: 1 INJECTION, POWDER, FOR SOLUTION INTRAVENOUS at 20:10

## 2018-12-26 RX ADMIN — Medication SCH CAP: at 10:21

## 2018-12-26 RX ADMIN — VANCOMYCIN HYDROCHLORIDE SCH MLS/HR: 1 INJECTION, POWDER, FOR SOLUTION INTRAVENOUS at 10:22

## 2018-12-26 RX ADMIN — PIPERACILLIN SODIUM AND TAZOBACTAM SODIUM SCH MLS/HR: 3; .375 INJECTION, POWDER, LYOPHILIZED, FOR SOLUTION INTRAVENOUS at 18:00

## 2018-12-26 NOTE — PDOC
Infectious Disease Note


Subjective


Subjective


Feeling alright this morning


Denies pain/F/C/N/V/D/SOA





Vital Sign


Vital Signs





Vital Signs








  Date Time  Temp Pulse Resp B/P (MAP) Pulse Ox O2 Delivery O2 Flow Rate FiO2


 


12/26/18 07:00 97.6 76 14 111/70 (84) 94 Room Air  





 97.6       











Physical Exam


PHYSICAL EXAM


GENERAL:  Resting quietly 


HEENT:  Normal conjunctivae.  Oral cavity:  Pharynx pink and moist.


NECK:  Supple.


LUNGS:  Clear to auscultation.


HEART:  S1, S2.


ABDOMEN:  Nondistended, soft and nontender with bowel sounds present.


EXTREMITIES:  No gross edema or cyanosis.  Right lower extremity rubor with


wrinkles and dry flaky skin.  He has 2 ulcers located anterolateral aspect of


the shin and dorsal aspect of the right foot with bleeding.  Dorsalis pedis


pulse difficult to palpate.  His toe nails are thick and yellow.


SKIN:  Warm without rash.


NEUROLOGIC:  Alert and oriented x 3.





Labs


Lab





Laboratory Tests








Test


 12/26/18


07:35


 


Vancomycin Level Trough


 20.1 mcg/mL


(10.0-20.0)


 


Vancomycin Last Dose Date 12/25/18 


 


Vancomycin Last Dose Time 2000 








Micro


ANAEROBIC-AEROBIC CULTURE  


                                PENDING





  ANAEROBIC RES 1  


                                PENDING





  AEROBIC CULT  


                                PENDING





  AEROBIC RES 1  


                                PENDING





  GRAM STAIN  Final  


        Final report





  GRAM STAIN RES 1  Final  


        Comment





        No white blood cells seen.





  GRAM STAIN RES 2  Final  


        Comment





        Moderate number of gram negative diplococci.


        Performed at:   - [facility], [place], [place]  [id number]


        : MEME Jackson MD, Phone:  [phone]





Objective


Assessment


RLE ulcerations and cellulitis


Arterial occlusive disease, vascular following. May need aorto-fem bypass graft 

and possible right fem-pop-bypass graft 


h/o DVT/PE


Tobacco dependence 


COPD





Plan


Plan of Care


continue vanc and Zosyn


wound culture in process


Monitor labs/temp/renal function closely


Local wound care





if surgery not planned for this admission then change antibiotics to po 

augmentin and doxy for 7 days











MIKA SEGOVIA MD Dec 26, 2018 10:11

## 2018-12-26 NOTE — PDOC
PROGRESS NOTES


Chief Complaint


Chief Complaint


1.  Severe bilateral PAD with RLE wound/ulceration. CTA showing extensive 

disease. Vascular surgery following; aortofemoral reconstruction planned 

pending further workup. Echo showed preserved LV systolic function. 


2.  RLE cellulitis


3.  Hypertension; low normotensive


4.  Hyperlipidemia


5.  H/o PE; tx with Eliquis. CTA with resolution


6.  Polycythemia; bone marrow biopsy 12/24


7. Dry skin





History of Present Illness


History of Present Illness


Pt seen and examined, spoke with nursing staff. Pt resting with NAD, was 

awakened with examination of R. Lower extremity. Bandaging examined and is CDI. 

After more direct questioning, pt reluctantly reports tobacco dependency.





Vitals


Vitals





Vital Signs








  Date Time  Temp Pulse Resp B/P (MAP) Pulse Ox O2 Delivery O2 Flow Rate FiO2


 


12/26/18 11:00 97.9 68 16 118/68 (85) 93 Room Air  





 97.9       











Physical Exam


Physical Exam


GENERAL:  Resting quietly 


HEENT:  Normal conjunctivae.  Oral cavity:  Pharynx pink and moist.


NECK:  Supple.


LUNGS:  Clear to auscultation.


HEART:  S1, S2.


ABDOMEN:  Nondistended, soft and nontender with bowel sounds present.


EXTREMITIES:  No gross edema or cyanosis.  Right lower extremity rubor with


wrinkles and dry flaky skin.  He has 2 ulcers located anterolateral aspect of


the shin and dorsal aspect of the right foot with bleeding.  Dorsalis pedis


pulse difficult to palpate.  His toe nails are thick and yellow.


SKIN:  Warm without rash.


NEUROLOGIC:  Alert and oriented x 3.


General:  Alert, Oriented X3, No acute distress


Heart:  Regular rate, Normal S1, Normal S2, No murmurs


Lungs:  Clear


Abdomen:  Normal bowel sounds, Soft, No tenderness


Extremities:  No cyanosis, Other (ISCHEMIC ULCER RIGHT FOOT/ CELLULITIS DEEP 

DORSAL FOOT WOUND)


Skin:  Other (MARKED VASCULAR INSUFF CHANGES RIGHT LOWER LEG, SKIN DRY,FRAGILE)





Labs


LABS





Laboratory Tests








Test


 12/26/18


07:35


 


Vancomycin Level Trough


 20.1 mcg/mL


(10.0-20.0)


 


Vancomycin Last Dose Date 12/25/18 


 


Vancomycin Last Dose Time 2000 











Review of Systems


Review of Systems


Denies CP


Denies SOB


Denies N/V





Assessment and Plan


Assessmemt and Plan


Assessment:


1.  Severe bilateral PAD with RLE wound/ulceration. CTA showing extensive 

disease. Vascular surgery following; aortofemoral reconstruction planned 

pending further workup. Echo showed preserved LV systolic function. 


2.  RLE cellulitis


3.  Hypertension; low normotensive


4.  Hyperlipidemia


5.  H/o PE; tx with Eliquis. CTA with resolution


6.  Polycythemia; bone marrow biopsy 12/24


7. Dry skin





Plan:


Vascular Surgeon consultation pending


Wound care


Continue IV antibiotics


PTOT


Home meds


Labs


D/C disposition pending





Comment


Review of Relevant


I have reviewed the following items karen (where applicable) has been applied.


Labs





Laboratory Tests








Test


 12/25/18


02:40 12/26/18


07:35


 


White Blood Count


 6.0 x10^3/uL


(4.0-11.0) 





 


Red Blood Count


 5.69 x10^6/uL


(4.30-5.70) 





 


Hemoglobin


 18.4 g/dL


(13.0-17.5) 





 


Hematocrit


 53.8 %


(39.0-53.0) 





 


Mean Corpuscular Volume 95 fL ()  


 


Mean Corpuscular Hemoglobin 32 pg (25-35)  


 


Mean Corpuscular Hemoglobin


Concent 34 g/dL


(31-37) 





 


Red Cell Distribution Width


 16.8 %


(11.5-14.5) 





 


Platelet Count


 146 x10^3/uL


(140-400) 





 


Neutrophils (%) (Auto) 63 % (31-73)  


 


Lymphocytes (%) (Auto) 17 % (24-48)  


 


Monocytes (%) (Auto) 14 % (0-9)  


 


Eosinophils (%) (Auto) 6 % (0-3)  


 


Basophils (%) (Auto) 1 % (0-3)  


 


Neutrophils # (Auto)


 3.8 x10^3uL


(1.8-7.7) 





 


Lymphocytes # (Auto)


 1.0 x10^3/uL


(1.0-4.8) 





 


Monocytes # (Auto)


 0.8 x10^3/uL


(0.0-1.1) 





 


Eosinophils # (Auto)


 0.3 x10^3/uL


(0.0-0.7) 





 


Basophils # (Auto)


 0.0 x10^3/uL


(0.0-0.2) 





 


Vancomycin Level Trough


 


 20.1 mcg/mL


(10.0-20.0)


 


Vancomycin Last Dose Date  12/25/18 


 


Vancomycin Last Dose Time  2000 








Laboratory Tests








Test


 12/26/18


07:35


 


Vancomycin Level Trough


 20.1 mcg/mL


(10.0-20.0)


 


Vancomycin Last Dose Date 12/25/18 


 


Vancomycin Last Dose Time 2000 








Microbiology


12/19/18 Blood Culture - Final, Complete


           NO GROWTH AFTER 5 DAYS


12/22/18 Anaerobic/Aerobic Culture, Resulted


           Pending


12/22/18 Anaerobic Culture Result 1 ([facility]), Resulted


           Pending


12/22/18 Aerobic Culture - Preliminary, Resulted


           


12/22/18 Aerobic Culture Result 1 ([facility]) - Preliminary, Resulted


           


12/22/18 Aerobic Culture Result 2 ([facility]) - Preliminary, Resulted


           


12/22/18 Aerobic Culture Result 3 ([facility]) - Preliminary, Resulted


           


12/22/18 Gram Stain - Final, Resulted


           


12/22/18 Gram Stain Result 1 ([facility]) - Final, Resulted


           


12/22/18 Gram Stain Result 2 ([facility]) - Final, Resulted


Medications





Current Medications


Sodium Chloride 1,000 ml @  1,000 mls/hr 1X  ONCE IV  Last administered on 12/19 /18at 19:19;  Start 12/19/18 at 19:00;  Stop 12/19/18 at 19:59;  Status DC


Vancomycin HCl (Vanco Per Pharmacy) 1 each PRN DAILY  PRN MC SEE COMMENTS Last 

administered on 12/26/18at 08:29;  Start 12/19/18 at 19:45


Vancomycin HCl 2 gm/Sodium Chloride 500 ml @  250 mls/hr ONCE  ONCE IV  Last 

administered on 12/19/18at 20:00;  Start 12/19/18 at 19:45;  Stop 12/19/18 at 21

:44;  Status DC


Ondansetron HCl (Zofran) 4 mg PRN Q8HRS  PRN IV NAUSEA/VOMITING 1ST CHOICE;  

Start 12/19/18 at 21:15;  Stop 12/20/18 at 21:14;  Status DC


Sodium Chloride 1,000 ml @  125 mls/hr Q8H IV  Last administered on 12/20/18at 

20:01;  Start 12/19/18 at 21:03;  Stop 12/20/18 at 21:02;  Status DC


Fentanyl Citrate (Fentanyl 2ml Vial) 25 mcg PRN Q2HR  PRN IV SEVERE PAIN Last 

administered on 12/20/18at 13:59;  Start 12/20/18 at 01:45;  Stop 12/20/18 at 14

:39;  Status DC


Vancomycin HCl 1.25 gm/Sodium Chloride 250 ml @  167 mls/hr Q12H IV  Last 

administered on 12/26/18at 10:22;  Start 12/20/18 at 08:00


Vancomycin HCl (Vancomycin Trough Level) 1 each 1X  ONCE MC ;  Start 12/22/18 

at 07:30;  Stop 12/22/18 at 07:30;  Status DC


Lactobacillus Rhamnosus (Culturelle) 1 cap BID PO  Last administered on 12/26/ 18at 10:21;  Start 12/20/18 at 09:00


Fentanyl Citrate (Fentanyl 2ml Vial) 50 mcg PRN Q2HR  PRN IV SEVERE PAIN Last 

administered on 12/21/18at 09:05;  Start 12/20/18 at 14:45


Apixaban (Eliquis) 5 mg BID PO  Last administered on 12/23/18at 09:39;  Start 12 /20/18 at 15:00;  Stop 12/24/18 at 11:45;  Status DC


Acetaminophen/ Hydrocodone Bitart (Lortab 7.5/325) 1 tab PRN Q4HRS  PRN PO PAIN 

Last administered on 12/24/18at 05:27;  Start 12/21/18 at 11:15


Info (Anti-Coagulation Monitoring By Pharmacy) 1 each PRN DAILY  PRN MC SEE 

COMMENTS Last administered on 12/23/18at 13:08;  Start 12/21/18 at 11:30;  Stop 

12/24/18 at 11:45;  Status DC


Magnesium Hydroxide (Milk Of Magnesia) 2,400 mg PRN DAILY  PRN PO CONSTIPATION 

Last administered on 12/25/18at 21:09;  Start 12/21/18 at 14:15


Iohexol (Omnipaque 300 Mg/ml) 100 ml 1X  ONCE IV  Last administered on 12/22/ 18at 11:45;  Start 12/22/18 at 11:45;  Stop 12/22/18 at 11:46;  Status DC


Info (CONTRAST GIVEN -- Rx MONITORING) 1 each PRN DAILY  PRN MC SEE COMMENTS;  

Start 12/22/18 at 11:45;  Stop 12/24/18 at 11:33;  Status DC


Piperacillin Sod/ Tazobactam Sod 3.375 gm/Sodium Chloride 50 ml @  100 mls/hr 

Q6HRS IV  Last administered on 12/26/18at 05:48;  Start 12/22/18 at 18:30


Potassium Chloride (Klor-Con) 40 meq 1X  ONCE PO  Last administered on 12/23/ 18at 17:44;  Start 12/23/18 at 14:30;  Stop 12/23/18 at 14:31;  Status DC


Potassium Chloride (Klor-Con) 20 meq DAILYWBKFT PO  Last administered on 12/26/ 18at 10:21;  Start 12/24/18 at 08:00


Iohexol (Omnipaque 300 Mg/ml) 95 ml 1X  ONCE IV  Last administered on 12/23/ 18at 15:09;  Start 12/23/18 at 14:45;  Stop 12/23/18 at 14:47;  Status DC


Info (CONTRAST GIVEN -- Rx MONITORING) 1 each PRN DAILY  PRN MC SEE COMMENTS;  

Start 12/23/18 at 15:00;  Stop 12/25/18 at 14:59;  Status DC


Regadenoson (Lexiscan) 0.4 mg 1X  ONCE IV  Last administered on 12/24/18at 08:30

;  Start 12/24/18 at 08:30;  Stop 12/24/18 at 08:31;  Status DC


Midazolam HCl (Versed) 2 mg STK-MED ONCE .ROUTE ;  Start 12/24/18 at 09:55;  

Stop 12/24/18 at 09:56;  Status DC


Fentanyl Citrate (Fentanyl 2ml Vial) 100 mcg STK-MED ONCE .ROUTE ;  Start 12/24/ 18 at 09:55;  Stop 12/24/18 at 09:56;  Status DC


Lidocaine/Sodium Bicarbonate (Buffered Lidocaine 1%) 3 ml STK-MED ONCE .ROUTE ;

  Start 12/24/18 at 09:57;  Stop 12/24/18 at 09:58;  Status DC


Lidocaine/Sodium Bicarbonate (Buffered Lidocaine 1%) 3 ml 1X  ONCE IJ  Last 

administered on 12/24/18at 10:25;  Start 12/24/18 at 10:00;  Stop 12/24/18 at 10

:12;  Status DC


Midazolam HCl (Versed) 2 mg 1X  ONCE IV  Last administered on 12/24/18at 10:26;

  Start 12/24/18 at 10:00;  Stop 12/24/18 at 10:12;  Status DC


Fentanyl Citrate (Fentanyl 2ml Vial) 100 mcg 1X  ONCE IV  Last administered on 

12/24/18at 10:27;  Start 12/24/18 at 10:00;  Stop 12/24/18 at 10:12;  Status DC


Atorvastatin Calcium (Lipitor) 40 mg QHS PO  Last administered on 12/25/18at 21:

02;  Start 12/24/18 at 21:00


Aspirin (Ecotrin) 81 mg DAILYWBKFT PO  Last administered on 12/26/18at 10:21;  

Start 12/25/18 at 08:00


Aspirin (Ecotrin) 325 mg 1X  ONCE PO  Last administered on 12/24/18at 14:15;  

Start 12/24/18 at 14:15;  Stop 12/24/18 at 14:16;  Status DC


Multi-Ingred Cream/Lotion/Oil/ Oint (Hydrocerin Cream) 1 aurelia PRN Q1HR  PRN TP 

DRY SKIN / SCALING;  Start 12/25/18 at 09:00


Vancomycin HCl (Vancomycin Trough Level) 1 each 1X  ONCE MC ;  Start 12/26/18 

at 07:30;  Stop 12/26/18 at 07:31;  Status DC





Active Scripts


Active


Reported


Eliquis (Apixaban) 5 Mg Tablet 5 Mg PO BID


Vitals/I & O





Vital Sign - Last 24 Hours








 12/25/18 12/25/18 12/25/18 12/25/18





 15:00 19:00 20:20 22:49


 


Temp 98.3 98.7  98.5





 98.3 98.7  98.5


 


Pulse 72 73  76


 


Resp 20 18  18


 


B/P (MAP) 120/59 (79) 106/78 (87)  111/66 (81)


 


Pulse Ox 93 93  94


 


O2 Delivery Room Air Room Air Room Air Room Air


 


    





    





 12/26/18 12/26/18 12/26/18 





 02:55 07:00 11:00 


 


Temp 98.5 97.6 97.9 





 98.5 97.6 97.9 


 


Pulse 74 76 68 


 


Resp 18 14 16 


 


B/P (MAP) 120/68 (85) 111/70 (84) 118/68 (85) 


 


Pulse Ox 93 94 93 


 


O2 Delivery Room Air Room Air Room Air 














Intake and Output   


 


 12/25/18 12/25/18 12/26/18





 15:01 23:01 07:01


 


Intake Total 540 ml 550 ml 250 ml


 


Output Total 950 ml 400 ml 1625 ml


 


Balance -410 ml 150 ml -1375 ml

















REGLA WALLACE K III DO Dec 26, 2018 11:38

## 2018-12-26 NOTE — PDOC
PULMONARY PROGRESS NOTES


Subjective


PT NOT MORE SOA


Vitals





Vital Signs








  Date Time  Temp Pulse Resp B/P (MAP) Pulse Ox O2 Delivery O2 Flow Rate FiO2


 


12/26/18 07:00 97.6 76 14 111/70 (84) 94 Room Air  





 97.6       








ROS:  No Nausea, No Chest Pain, No Abdominal Pain, No Increase Cough


Lungs:  Clear


Cardiovascular:  S1, S2


Abdomen:  Soft


Neuro Exam:  Alert


Extremities:  No Edema


Skin:  Warm


Labs





Laboratory Tests








Test


 12/25/18


02:40 12/26/18


07:35


 


White Blood Count


 6.0 x10^3/uL


(4.0-11.0) 





 


Red Blood Count


 5.69 x10^6/uL


(4.30-5.70) 





 


Hemoglobin


 18.4 g/dL


(13.0-17.5) 





 


Hematocrit


 53.8 %


(39.0-53.0) 





 


Mean Corpuscular Volume 95 fL ()  


 


Mean Corpuscular Hemoglobin 32 pg (25-35)  


 


Mean Corpuscular Hemoglobin


Concent 34 g/dL


(31-37) 





 


Red Cell Distribution Width


 16.8 %


(11.5-14.5) 





 


Platelet Count


 146 x10^3/uL


(140-400) 





 


Neutrophils (%) (Auto) 63 % (31-73)  


 


Lymphocytes (%) (Auto) 17 % (24-48)  


 


Monocytes (%) (Auto) 14 % (0-9)  


 


Eosinophils (%) (Auto) 6 % (0-3)  


 


Basophils (%) (Auto) 1 % (0-3)  


 


Neutrophils # (Auto)


 3.8 x10^3uL


(1.8-7.7) 





 


Lymphocytes # (Auto)


 1.0 x10^3/uL


(1.0-4.8) 





 


Monocytes # (Auto)


 0.8 x10^3/uL


(0.0-1.1) 





 


Eosinophils # (Auto)


 0.3 x10^3/uL


(0.0-0.7) 





 


Basophils # (Auto)


 0.0 x10^3/uL


(0.0-0.2) 





 


Vancomycin Level Trough


 


 20.1 mcg/mL


(10.0-20.0)


 


Vancomycin Last Dose Date  12/25/18 


 


Vancomycin Last Dose Time  2000 








Laboratory Tests








Test


 12/26/18


07:35


 


Vancomycin Level Trough


 20.1 mcg/mL


(10.0-20.0)


 


Vancomycin Last Dose Date 12/25/18 


 


Vancomycin Last Dose Time 2000 








Medications





Active Scripts








 Medications  Dose


 Route/Sig


 Max Daily Dose Days Date Category


 


 Eliquis


  (Apixaban) 5 Mg


 Tablet  5 Mg


 PO BID


   3/1/18 Reported











Impression


.


IMPRESSION:


1.  History of pulmonary embolism.


2.  Chronic obstructive pulmonary disease.


3.  Tobacco dependence.


4.  Peripheral vascular disease.


5.  Chronic obstructive pulmonary disease.


6.  Ischemic ulceration of the right foot and right shin.














CT CHEST


IMPRESSION:


1. No PE.


2. No pneumonia.





Plan


.


RESP STATUS IS COMPENSATED





NO PE NO NEED FOR CONTINUED ANTICOAGULATION


IF SURGERY IS NEEDED OK BY TALIA STANTON MD Dec 26, 2018 09:34

## 2018-12-26 NOTE — PDOC
Provider Note


Provider Note


Vascular F/U


F/U PVD, S/P remote rt fem-pop bypass 2008 by Dr Lola Medeiros lower leg wounds


CTA juan antonio common and external iliac occlusions


Imp: Severe PVD


Plan: Aorto bi femoral bypass when cleared for surgery ? Friday ?











FELIPE SALEH MD Dec 26, 2018 18:23

## 2018-12-27 VITALS — SYSTOLIC BLOOD PRESSURE: 121 MMHG | DIASTOLIC BLOOD PRESSURE: 72 MMHG

## 2018-12-27 VITALS — DIASTOLIC BLOOD PRESSURE: 69 MMHG | SYSTOLIC BLOOD PRESSURE: 111 MMHG

## 2018-12-27 VITALS — DIASTOLIC BLOOD PRESSURE: 62 MMHG | SYSTOLIC BLOOD PRESSURE: 122 MMHG

## 2018-12-27 VITALS — DIASTOLIC BLOOD PRESSURE: 75 MMHG | SYSTOLIC BLOOD PRESSURE: 133 MMHG

## 2018-12-27 VITALS — SYSTOLIC BLOOD PRESSURE: 118 MMHG | DIASTOLIC BLOOD PRESSURE: 70 MMHG

## 2018-12-27 LAB
ANION GAP SERPL CALC-SCNC: 12 MMOL/L (ref 6–14)
BASOPHILS # BLD AUTO: 0 X10^3/UL (ref 0–0.2)
BASOPHILS NFR BLD: 1 % (ref 0–3)
BUN SERPL-MCNC: 20 MG/DL (ref 8–26)
CALCIUM SERPL-MCNC: 8.9 MG/DL (ref 8.5–10.1)
CHLORIDE SERPL-SCNC: 105 MMOL/L (ref 98–107)
CO2 SERPL-SCNC: 24 MMOL/L (ref 21–32)
CREAT SERPL-MCNC: 1 MG/DL (ref 0.7–1.3)
EOSINOPHIL NFR BLD: 0.5 X10^3/UL (ref 0–0.7)
EOSINOPHIL NFR BLD: 8 % (ref 0–3)
ERYTHROCYTE [DISTWIDTH] IN BLOOD BY AUTOMATED COUNT: 16.5 % (ref 11.5–14.5)
GFR SERPLBLD BASED ON 1.73 SQ M-ARVRAT: 72.3 ML/MIN
GLUCOSE SERPL-MCNC: 101 MG/DL (ref 70–99)
HCT VFR BLD CALC: 54.9 % (ref 39–53)
HGB BLD-MCNC: 18.9 G/DL (ref 13–17.5)
LYMPHOCYTES # BLD: 0.8 X10^3/UL (ref 1–4.8)
LYMPHOCYTES NFR BLD AUTO: 14 % (ref 24–48)
MCH RBC QN AUTO: 32 PG (ref 25–35)
MCHC RBC AUTO-ENTMCNC: 34 G/DL (ref 31–37)
MCV RBC AUTO: 94 FL (ref 79–100)
MONO #: 0.9 X10^3/UL (ref 0–1.1)
MONOCYTES NFR BLD: 14 % (ref 0–9)
NEUT #: 4 X10^3UL (ref 1.8–7.7)
NEUTROPHILS NFR BLD AUTO: 64 % (ref 31–73)
PLATELET # BLD AUTO: 158 X10^3/UL (ref 140–400)
POTASSIUM SERPL-SCNC: 4.3 MMOL/L (ref 3.5–5.1)
RBC # BLD AUTO: 5.84 X10^6/UL (ref 4.3–5.7)
SODIUM SERPL-SCNC: 141 MMOL/L (ref 136–145)
WBC # BLD AUTO: 6.2 X10^3/UL (ref 4–11)

## 2018-12-27 RX ADMIN — PIPERACILLIN SODIUM AND TAZOBACTAM SODIUM SCH MLS/HR: 3; .375 INJECTION, POWDER, LYOPHILIZED, FOR SOLUTION INTRAVENOUS at 12:14

## 2018-12-27 RX ADMIN — PIPERACILLIN SODIUM AND TAZOBACTAM SODIUM SCH MLS/HR: 3; .375 INJECTION, POWDER, LYOPHILIZED, FOR SOLUTION INTRAVENOUS at 17:21

## 2018-12-27 RX ADMIN — PIPERACILLIN SODIUM AND TAZOBACTAM SODIUM SCH MLS/HR: 3; .375 INJECTION, POWDER, LYOPHILIZED, FOR SOLUTION INTRAVENOUS at 06:06

## 2018-12-27 RX ADMIN — Medication SCH CAP: at 09:26

## 2018-12-27 RX ADMIN — BACITRACIN SCH MLS/HR: 5000 INJECTION, POWDER, FOR SOLUTION INTRAMUSCULAR at 16:17

## 2018-12-27 RX ADMIN — Medication SCH CAP: at 21:22

## 2018-12-27 RX ADMIN — ASPIRIN SCH MG: 81 TABLET, COATED ORAL at 09:25

## 2018-12-27 RX ADMIN — ATORVASTATIN CALCIUM SCH MG: 40 TABLET, FILM COATED ORAL at 21:22

## 2018-12-27 RX ADMIN — POTASSIUM CHLORIDE SCH MEQ: 1500 TABLET, EXTENDED RELEASE ORAL at 09:26

## 2018-12-27 NOTE — RAD
Bilateral lower extremity vein mapping, 12/27/2018:

 

Duplex evaluation of the greater saphenous and lesser saphenous veins in 

both lower extremities was performed including grayscale, color-flow and 

spectral Doppler analysis.

 

On the right, the greater saphenous vein has reportedly been previously 

harvested for graft purposes.

 

The right lesser saphenous vein is patent measuring 1.6 to 6.1 mm in the 

lower leg.

 

The left greater saphenous vein is patent measuring 2.9 to 5.9 mm in the 

thigh and 2.0 to 2.6 mm in the lower leg.

 

The left lesser saphenous vein is patent measuring 2.3 to 2.7 mm in the 

lower leg.

 

Incidental note is made of a mildly prominent left inguinal lymph node.

 

 

IMPRESSION: Patent left greater saphenous and bilateral lesser saphenous 

veins with measurements as described above and fully delineated on the 

technologist worksheet available in the [email or web address] PACs system.

 

Electronically signed by: Rick Moritz, MD (12/27/2018 12:07 PM) [facility]

## 2018-12-27 NOTE — PDOC
PROGRESS NOTES


Subjective


Subjective


HPI - f/u of Polycythemia





ROS - no CP





Objective


Objective





Vital Signs








  Date Time  Temp Pulse Resp B/P (MAP) Pulse Ox O2 Delivery O2 Flow Rate FiO2


 


12/27/18 07:00 98.0 79 20 118/70 (86) 95 Room Air  





 98.0       


 


12/26/18 08:00       2.0 














Intake and Output 


 


 12/27/18





 07:01


 


Intake Total 880 ml


 


Output Total 2650 ml


 


Balance -1770 ml


 


 


 


Intake Oral 580 ml


 


IV Total 300 ml


 


Output Urine Total 2650 ml


 


# Voids 1


 


# Bowel Movements 1











Physical Exam


Heart:  Normal S1, Normal S2


General:  Alert, Oriented X3


Lungs:  Clear to auscultation


Neuro:  Normal speech


Psych/Mental Status:  Mental status NL


Skin:  No rashes





Assessment


Assessment


Problems


Medical Problems:


(1) Cellulitis of right lower leg


Status: Acute  





(2) Right foot ulcer


Status: Acute  





IMPRESSION AND PLAN:





1.  Polycythemia.  He has had elevated hemoglobin and hematocrit since 2015. 


His erythropoietin was low at 2.5 in 2015.  However, the JAK2 mutation was


negative.  I agree to proceed with further testing with next generation


sequencing for JAK2 mutation.  It appears that this was ordered by Dr. Lupe Ulloa in 2017 and I will check with Pathology if this was completed.  In


addition, he also needs a bone marrow biopsy for diagnosis of polycythemia vera


and I discussed with the patient and he agrees to proceed with a biopsy to be


done on 12/24/2018.  The patient does understand that he had a phlebotomy on


05/08/2017, but he did not understand that it is therapeutic phlebotomy and he


felt the old bag of blood was taken for testing.  I educated him that phlebotomy


was a therapeutic procedure.  In addition, he has not been compliant with


followup appointments.  He did not keep his appointment with Dr. Lupe Ulloa and


he did not keep his appointment with Dr. Karla Atkinson.  I have advised him to


be compliant for followup and management.


Hb 18.4 on 12/23/18.


Hb 18 on 12/24/18.


Hb worse at 18.9 on 12/27/18.





Bone marrow bx 12/24/18. I d/w pathology, results pending.


Proceed with phlebotomy 500 cc to bring the Hb.Hct down prior to surgery.


I d/w RN.





2.  Pulmonary embolism in 02/2018.  He also has a history of DVT in the past. 


He is on Eliquis and the pulmonary embolism has resolved as of CT angiogram done


on 12/22/2018.  I discussed with Dr. Atkinson and Dr. Atkinson is considering to


discontinue. No h/o DVT, he reports blood clot in rt leg in 2004, but did not 

receive anticoagulation per pt


and hence unlikely that this was DVT.





3.  Peripheral arterial disease.  Appreciate Vascular Surgery consultation.  He


will need surgery after the hemoglobin and hematocrit are controlled and after


the workup is completed.





4. Thrombocytopenia, monitor cbc, plt better at 158 on 12/27/18.








Comment


Review of Relevant


I have reviewed the following items karen (where applicable) has been applied.


Labs





Laboratory Tests








Test


 12/26/18


07:35 12/27/18


04:35


 


Vancomycin Level Trough


 20.1 mcg/mL


(10.0-20.0) 





 


Vancomycin Last Dose Date 12/25/18  


 


Vancomycin Last Dose Time 2000  


 


White Blood Count


 


 6.2 x10^3/uL


(4.0-11.0)


 


Red Blood Count


 


 5.84 x10^6/uL


(4.30-5.70)


 


Hemoglobin


 


 18.9 g/dL


(13.0-17.5)


 


Hematocrit


 


 54.9 %


(39.0-53.0)


 


Mean Corpuscular Volume  94 fL () 


 


Mean Corpuscular Hemoglobin  32 pg (25-35) 


 


Mean Corpuscular Hemoglobin


Concent 


 34 g/dL


(31-37)


 


Red Cell Distribution Width


 


 16.5 %


(11.5-14.5)


 


Platelet Count


 


 158 x10^3/uL


(140-400)


 


Neutrophils (%) (Auto)  64 % (31-73) 


 


Lymphocytes (%) (Auto)  14 % (24-48) 


 


Monocytes (%) (Auto)  14 % (0-9) 


 


Eosinophils (%) (Auto)  8 % (0-3) 


 


Basophils (%) (Auto)  1 % (0-3) 


 


Neutrophils # (Auto)


 


 4.0 x10^3uL


(1.8-7.7)


 


Lymphocytes # (Auto)


 


 0.8 x10^3/uL


(1.0-4.8)


 


Monocytes # (Auto)


 


 0.9 x10^3/uL


(0.0-1.1)


 


Eosinophils # (Auto)


 


 0.5 x10^3/uL


(0.0-0.7)


 


Basophils # (Auto)


 


 0.0 x10^3/uL


(0.0-0.2)


 


Sodium Level


 


 141 mmol/L


(136-145)


 


Potassium Level


 


 4.3 mmol/L


(3.5-5.1)


 


Chloride Level


 


 105 mmol/L


()


 


Carbon Dioxide Level


 


 24 mmol/L


(21-32)


 


Anion Gap  12 (6-14) 


 


Blood Urea Nitrogen


 


 20 mg/dL


(8-26)


 


Creatinine


 


 1.0 mg/dL


(0.7-1.3)


 


Estimated GFR


(Cockcroft-Gault) 


 72.3 





 


Glucose Level


 


 101 mg/dL


(70-99)


 


Calcium Level


 


 8.9 mg/dL


(8.5-10.1)








Laboratory Tests








Test


 12/27/18


04:35


 


White Blood Count


 6.2 x10^3/uL


(4.0-11.0)


 


Red Blood Count


 5.84 x10^6/uL


(4.30-5.70)


 


Hemoglobin


 18.9 g/dL


(13.0-17.5)


 


Hematocrit


 54.9 %


(39.0-53.0)


 


Mean Corpuscular Volume 94 fL () 


 


Mean Corpuscular Hemoglobin 32 pg (25-35) 


 


Mean Corpuscular Hemoglobin


Concent 34 g/dL


(31-37)


 


Red Cell Distribution Width


 16.5 %


(11.5-14.5)


 


Platelet Count


 158 x10^3/uL


(140-400)


 


Neutrophils (%) (Auto) 64 % (31-73) 


 


Lymphocytes (%) (Auto) 14 % (24-48) 


 


Monocytes (%) (Auto) 14 % (0-9) 


 


Eosinophils (%) (Auto) 8 % (0-3) 


 


Basophils (%) (Auto) 1 % (0-3) 


 


Neutrophils # (Auto)


 4.0 x10^3uL


(1.8-7.7)


 


Lymphocytes # (Auto)


 0.8 x10^3/uL


(1.0-4.8)


 


Monocytes # (Auto)


 0.9 x10^3/uL


(0.0-1.1)


 


Eosinophils # (Auto)


 0.5 x10^3/uL


(0.0-0.7)


 


Basophils # (Auto)


 0.0 x10^3/uL


(0.0-0.2)


 


Sodium Level


 141 mmol/L


(136-145)


 


Potassium Level


 4.3 mmol/L


(3.5-5.1)


 


Chloride Level


 105 mmol/L


()


 


Carbon Dioxide Level


 24 mmol/L


(21-32)


 


Anion Gap 12 (6-14) 


 


Blood Urea Nitrogen


 20 mg/dL


(8-26)


 


Creatinine


 1.0 mg/dL


(0.7-1.3)


 


Estimated GFR


(Cockcroft-Gault) 72.3 





 


Glucose Level


 101 mg/dL


(70-99)


 


Calcium Level


 8.9 mg/dL


(8.5-10.1)








Microbiology


12/19/18 Blood Culture - Final, Complete


           NO GROWTH AFTER 5 DAYS


12/22/18 Anaerobic/Aerobic Culture - Preliminary, Resulted


           


12/22/18 Anaerobic Culture Result 1 ([facility]) - Preliminary, Resulted


           


12/22/18 Aerobic Culture - Final, Resulted


           


12/22/18 Aerobic Culture Result 1 ([facility]) - Final, Resulted


           


12/22/18 Aerobic Culture Result 2 ([facility]) - Final, Resulted


           


12/22/18 Aerobic Culture Result 3 ([facility]) - Final, Resulted


           


12/22/18 Antimicrobic Susceptibility - Final, Resulted


           


12/22/18 Gram Stain - Final, Resulted


           


12/22/18 Gram Stain Result 1 ([facility]) - Final, Resulted


           


12/22/18 Gram Stain Result 2 ([facility]) - Final, Resulted


Medications





Current Medications


Sodium Chloride 1,000 ml @  1,000 mls/hr 1X  ONCE IV  Last administered on 12/19 /18at 19:19;  Start 12/19/18 at 19:00;  Stop 12/19/18 at 19:59;  Status DC


Vancomycin HCl (Vanco Per Pharmacy) 1 each PRN DAILY  PRN MC SEE COMMENTS Last 

administered on 12/26/18at 12:35;  Start 12/19/18 at 19:45;  Stop 12/27/18 at 09

:15;  Status DC


Vancomycin HCl 2 gm/Sodium Chloride 500 ml @  250 mls/hr ONCE  ONCE IV  Last 

administered on 12/19/18at 20:00;  Start 12/19/18 at 19:45;  Stop 12/19/18 at 21

:44;  Status DC


Ondansetron HCl (Zofran) 4 mg PRN Q8HRS  PRN IV NAUSEA/VOMITING 1ST CHOICE;  

Start 12/19/18 at 21:15;  Stop 12/20/18 at 21:14;  Status DC


Sodium Chloride 1,000 ml @  125 mls/hr Q8H IV  Last administered on 12/20/18at 

20:01;  Start 12/19/18 at 21:03;  Stop 12/20/18 at 21:02;  Status DC


Fentanyl Citrate (Fentanyl 2ml Vial) 25 mcg PRN Q2HR  PRN IV SEVERE PAIN Last 

administered on 12/20/18at 13:59;  Start 12/20/18 at 01:45;  Stop 12/20/18 at 14

:39;  Status DC


Vancomycin HCl 1.25 gm/Sodium Chloride 250 ml @  167 mls/hr Q12H IV  Last 

administered on 12/26/18at 20:10;  Start 12/20/18 at 08:00;  Stop 12/27/18 at 09

:15;  Status DC


Vancomycin HCl (Vancomycin Trough Level) 1 each 1X  ONCE MC ;  Start 12/22/18 

at 07:30;  Stop 12/22/18 at 07:30;  Status DC


Lactobacillus Rhamnosus (Culturelle) 1 cap BID PO  Last administered on 12/27/ 18at 09:26;  Start 12/20/18 at 09:00


Fentanyl Citrate (Fentanyl 2ml Vial) 50 mcg PRN Q2HR  PRN IV SEVERE PAIN Last 

administered on 12/21/18at 09:05;  Start 12/20/18 at 14:45


Apixaban (Eliquis) 5 mg BID PO  Last administered on 12/23/18at 09:39;  Start 12 /20/18 at 15:00;  Stop 12/24/18 at 11:45;  Status DC


Acetaminophen/ Hydrocodone Bitart (Lortab 7.5/325) 1 tab PRN Q4HRS  PRN PO PAIN 

Last administered on 12/24/18at 05:27;  Start 12/21/18 at 11:15


Info (Anti-Coagulation Monitoring By Pharmacy) 1 each PRN DAILY  PRN MC SEE 

COMMENTS Last administered on 12/23/18at 13:08;  Start 12/21/18 at 11:30;  Stop 

12/24/18 at 11:45;  Status DC


Magnesium Hydroxide (Milk Of Magnesia) 2,400 mg PRN DAILY  PRN PO CONSTIPATION 

Last administered on 12/25/18at 21:09;  Start 12/21/18 at 14:15


Iohexol (Omnipaque 300 Mg/ml) 100 ml 1X  ONCE IV  Last administered on 12/22/ 18at 11:45;  Start 12/22/18 at 11:45;  Stop 12/22/18 at 11:46;  Status DC


Info (CONTRAST GIVEN -- Rx MONITORING) 1 each PRN DAILY  PRN MC SEE COMMENTS;  

Start 12/22/18 at 11:45;  Stop 12/24/18 at 11:33;  Status DC


Piperacillin Sod/ Tazobactam Sod 3.375 gm/Sodium Chloride 50 ml @  100 mls/hr 

Q6HRS IV  Last administered on 12/27/18at 06:06;  Start 12/22/18 at 18:30


Potassium Chloride (Klor-Con) 40 meq 1X  ONCE PO  Last administered on 12/23/ 18at 17:44;  Start 12/23/18 at 14:30;  Stop 12/23/18 at 14:31;  Status DC


Potassium Chloride (Klor-Con) 20 meq DAILYWBKFT PO  Last administered on 12/27/ 18at 09:26;  Start 12/24/18 at 08:00


Iohexol (Omnipaque 300 Mg/ml) 95 ml 1X  ONCE IV  Last administered on 12/23/ 18at 15:09;  Start 12/23/18 at 14:45;  Stop 12/23/18 at 14:47;  Status DC


Info (CONTRAST GIVEN -- Rx MONITORING) 1 each PRN DAILY  PRN MC SEE COMMENTS;  

Start 12/23/18 at 15:00;  Stop 12/25/18 at 14:59;  Status DC


Regadenoson (Lexiscan) 0.4 mg 1X  ONCE IV  Last administered on 12/24/18at 08:30

;  Start 12/24/18 at 08:30;  Stop 12/24/18 at 08:31;  Status DC


Midazolam HCl (Versed) 2 mg STK-MED ONCE .ROUTE ;  Start 12/24/18 at 09:55;  

Stop 12/24/18 at 09:56;  Status DC


Fentanyl Citrate (Fentanyl 2ml Vial) 100 mcg STK-MED ONCE .ROUTE ;  Start 12/24/ 18 at 09:55;  Stop 12/24/18 at 09:56;  Status DC


Lidocaine/Sodium Bicarbonate (Buffered Lidocaine 1%) 3 ml STK-MED ONCE .ROUTE ;

  Start 12/24/18 at 09:57;  Stop 12/24/18 at 09:58;  Status DC


Lidocaine/Sodium Bicarbonate (Buffered Lidocaine 1%) 3 ml 1X  ONCE IJ  Last 

administered on 12/24/18at 10:25;  Start 12/24/18 at 10:00;  Stop 12/24/18 at 10

:12;  Status DC


Midazolam HCl (Versed) 2 mg 1X  ONCE IV  Last administered on 12/24/18at 10:26;

  Start 12/24/18 at 10:00;  Stop 12/24/18 at 10:12;  Status DC


Fentanyl Citrate (Fentanyl 2ml Vial) 100 mcg 1X  ONCE IV  Last administered on 

12/24/18at 10:27;  Start 12/24/18 at 10:00;  Stop 12/24/18 at 10:12;  Status DC


Atorvastatin Calcium (Lipitor) 40 mg QHS PO  Last administered on 12/26/18at 20:

11;  Start 12/24/18 at 21:00


Aspirin (Ecotrin) 81 mg DAILYWBKFT PO  Last administered on 12/27/18at 09:25;  

Start 12/25/18 at 08:00


Aspirin (Ecotrin) 325 mg 1X  ONCE PO  Last administered on 12/24/18at 14:15;  

Start 12/24/18 at 14:15;  Stop 12/24/18 at 14:16;  Status DC


Multi-Ingred Cream/Lotion/Oil/ Oint (Hydrocerin Cream) 1 aurelia PRN Q1HR  PRN TP 

DRY SKIN / SCALING;  Start 12/25/18 at 09:00


Vancomycin HCl (Vancomycin Trough Level) 1 each 1X  ONCE MC ;  Start 12/26/18 

at 07:30;  Stop 12/26/18 at 07:31;  Status DC


Polyethylene Glycol (miraLAX PACKET) 17 gm 1X  ONCE PO ;  Start 12/30/18 at 09:

00;  Stop 12/30/18 at 09:01





Active Scripts


Active


Reported


Eliquis (Apixaban) 5 Mg Tablet 5 Mg PO BID


Vitals/I & O





Vital Sign - Last 24 Hours








 12/26/18 12/26/18 12/26/18 12/26/18





 15:00 19:00 20:00 22:54


 


Temp 98.6 97.8  98.6





 98.6 97.8  98.6


 


Pulse 70 78  82


 


Resp 16 18  18


 


B/P (MAP) 98/57 (71) 111/59 (76)  116/60 (78)


 


Pulse Ox 95 92  94


 


O2 Delivery Room Air Room Air Room Air Room Air


 


    





    





 12/27/18 12/27/18  





 03:00 07:00  


 


Temp 98.1 98.0  





 98.1 98.0  


 


Pulse 60 79  


 


Resp 18 20  


 


B/P (MAP) 121/72 (88) 118/70 (86)  


 


Pulse Ox 93 95  


 


O2 Delivery Room Air Room Air  














Intake and Output   


 


 12/26/18 12/26/18 12/27/18





 15:01 23:01 07:01


 


Intake Total 360 ml 370 ml 150 ml


 


Output Total 825 ml 275 ml 1550 ml


 


Balance -465 ml 95 ml -1400 ml

















LUCIUS GREY MD Dec 27, 2018 12:16

## 2018-12-27 NOTE — PDOC
PULMONARY PROGRESS NOTES


Subjective


PT NOT MORE SOA


Vitals





Vital Signs








  Date Time  Temp Pulse Resp B/P (MAP) Pulse Ox O2 Delivery O2 Flow Rate FiO2


 


12/27/18 07:00 98.0 79 20 118/70 (86) 95 Room Air  





 98.0       


 


12/26/18 08:00       2.0 








ROS:  No Nausea, No Chest Pain, No Abdominal Pain, No Increase Cough


Lungs:  Clear


Cardiovascular:  S1, S2


Abdomen:  Soft


Neuro Exam:  Alert


Extremities:  No Edema


Skin:  Warm


Labs





Laboratory Tests








Test


 12/26/18


07:35 12/27/18


04:35


 


Vancomycin Level Trough


 20.1 mcg/mL


(10.0-20.0) 





 


Vancomycin Last Dose Date 12/25/18  


 


Vancomycin Last Dose Time 2000  


 


White Blood Count


 


 6.2 x10^3/uL


(4.0-11.0)


 


Red Blood Count


 


 5.84 x10^6/uL


(4.30-5.70)


 


Hemoglobin


 


 18.9 g/dL


(13.0-17.5)


 


Hematocrit


 


 54.9 %


(39.0-53.0)


 


Mean Corpuscular Volume  94 fL () 


 


Mean Corpuscular Hemoglobin  32 pg (25-35) 


 


Mean Corpuscular Hemoglobin


Concent 


 34 g/dL


(31-37)


 


Red Cell Distribution Width


 


 16.5 %


(11.5-14.5)


 


Platelet Count


 


 158 x10^3/uL


(140-400)


 


Neutrophils (%) (Auto)  64 % (31-73) 


 


Lymphocytes (%) (Auto)  14 % (24-48) 


 


Monocytes (%) (Auto)  14 % (0-9) 


 


Eosinophils (%) (Auto)  8 % (0-3) 


 


Basophils (%) (Auto)  1 % (0-3) 


 


Neutrophils # (Auto)


 


 4.0 x10^3uL


(1.8-7.7)


 


Lymphocytes # (Auto)


 


 0.8 x10^3/uL


(1.0-4.8)


 


Monocytes # (Auto)


 


 0.9 x10^3/uL


(0.0-1.1)


 


Eosinophils # (Auto)


 


 0.5 x10^3/uL


(0.0-0.7)


 


Basophils # (Auto)


 


 0.0 x10^3/uL


(0.0-0.2)


 


Sodium Level


 


 141 mmol/L


(136-145)


 


Potassium Level


 


 4.3 mmol/L


(3.5-5.1)


 


Chloride Level


 


 105 mmol/L


()


 


Carbon Dioxide Level


 


 24 mmol/L


(21-32)


 


Anion Gap  12 (6-14) 


 


Blood Urea Nitrogen


 


 20 mg/dL


(8-26)


 


Creatinine


 


 1.0 mg/dL


(0.7-1.3)


 


Estimated GFR


(Cockcroft-Gault) 


 72.3 





 


Glucose Level


 


 101 mg/dL


(70-99)


 


Calcium Level


 


 8.9 mg/dL


(8.5-10.1)








Laboratory Tests








Test


 12/27/18


04:35


 


White Blood Count


 6.2 x10^3/uL


(4.0-11.0)


 


Red Blood Count


 5.84 x10^6/uL


(4.30-5.70)


 


Hemoglobin


 18.9 g/dL


(13.0-17.5)


 


Hematocrit


 54.9 %


(39.0-53.0)


 


Mean Corpuscular Volume 94 fL () 


 


Mean Corpuscular Hemoglobin 32 pg (25-35) 


 


Mean Corpuscular Hemoglobin


Concent 34 g/dL


(31-37)


 


Red Cell Distribution Width


 16.5 %


(11.5-14.5)


 


Platelet Count


 158 x10^3/uL


(140-400)


 


Neutrophils (%) (Auto) 64 % (31-73) 


 


Lymphocytes (%) (Auto) 14 % (24-48) 


 


Monocytes (%) (Auto) 14 % (0-9) 


 


Eosinophils (%) (Auto) 8 % (0-3) 


 


Basophils (%) (Auto) 1 % (0-3) 


 


Neutrophils # (Auto)


 4.0 x10^3uL


(1.8-7.7)


 


Lymphocytes # (Auto)


 0.8 x10^3/uL


(1.0-4.8)


 


Monocytes # (Auto)


 0.9 x10^3/uL


(0.0-1.1)


 


Eosinophils # (Auto)


 0.5 x10^3/uL


(0.0-0.7)


 


Basophils # (Auto)


 0.0 x10^3/uL


(0.0-0.2)


 


Sodium Level


 141 mmol/L


(136-145)


 


Potassium Level


 4.3 mmol/L


(3.5-5.1)


 


Chloride Level


 105 mmol/L


()


 


Carbon Dioxide Level


 24 mmol/L


(21-32)


 


Anion Gap 12 (6-14) 


 


Blood Urea Nitrogen


 20 mg/dL


(8-26)


 


Creatinine


 1.0 mg/dL


(0.7-1.3)


 


Estimated GFR


(Cockcroft-Gault) 72.3 





 


Glucose Level


 101 mg/dL


(70-99)


 


Calcium Level


 8.9 mg/dL


(8.5-10.1)








Medications





Active Scripts








 Medications  Dose


 Route/Sig


 Max Daily Dose Days Date Category


 


 Eliquis


  (Apixaban) 5 Mg


 Tablet  5 Mg


 PO BID


   3/1/18 Reported











Impression


.


IMPRESSION:


1.  History of pulmonary embolism.


2.  Chronic obstructive pulmonary disease.


3.  Tobacco dependence.


4.  Peripheral vascular disease.


5.  Chronic obstructive pulmonary disease.


6.  Ischemic ulceration of the right foot and right shin.














CT CHEST


IMPRESSION:


1. No PE.


2. No pneumonia.





Plan


.


RESP STATUS IS COMPENSATED


FOLLOW UP ON BONE MARROW





NO PE NO NEED FOR CONTINUED ANTICOAGULATION


IF SURGERY IS NEEDED OK BY TALIA STANTON MD Dec 27, 2018 09:16

## 2018-12-27 NOTE — RAD
Bilateral upper extremity vein mapping, 12/27/2018:

 

HISTORY: Bypass graft planning

 

Grayscale, color-flow and spectral Doppler analysis was utilized in 

examination of the basilic and cephalic veins in both upper extremities.

 

On the right, the cephalic vein in the upper arm is patent but quite 

small, measuring 0.7 to 2.1 mm. In the forearm it is thrombosed.

 

The basilic vein in the right upper arm is patent measuring 2.0 to 2.9 mm.

In the forearm it is tiny and incompletely visualized.

 

On the left, the cephalic vein is small, measuring 0.9 to 1.6 mm in the 

upper arm and 1.7 to 2.8 mm in the forearm.

 

The basilic vein in the left upper arm measures 4.2 to 4.9 mm. It is 

predominantly thrombosed in the forearm.

 

IMPRESSION: The largest patent superficial upper extremity vein is the 

left basilic vein in the upper arm as described above.

 

Electronically signed by: Rick Moritz, MD (12/27/2018 12:03 PM) [facility]

## 2018-12-27 NOTE — PDOC
Infectious Disease Note


Subjective


Subjective


Feeling alright this morning


Denies pain/F/C/N/V/D/SOA





Vital Sign


Vital Signs





Vital Signs








  Date Time  Temp Pulse Resp B/P (MAP) Pulse Ox O2 Delivery O2 Flow Rate FiO2


 


12/27/18 07:00 98.0 79 20 118/70 (86) 95 Room Air  





 98.0       


 


12/26/18 08:00       2.0 











Physical Exam


PHYSICAL EXAM


GENERAL:  Resting quietly 


HEENT:  Normal conjunctivae.  Oral cavity:  Pharynx pink and moist.


NECK:  Supple.


LUNGS:  Clear to auscultation.


HEART:  S1, S2.


ABDOMEN:  Nondistended, soft and nontender with bowel sounds present.


EXTREMITIES:  No gross edema or cyanosis.  Right lower extremity rubor with


wrinkles and dry flaky skin.  He has 2 ulcers located anterolateral aspect of


the shin and dorsal aspect of the right foot with bleeding.  Dorsalis pedis


pulse difficult to palpate.  His toe nails are thick and yellow.


SKIN:  Warm without rash.


NEUROLOGIC:  Alert and oriented x 3.





Labs


Lab





Laboratory Tests








Test


 12/27/18


04:35


 


White Blood Count


 6.2 x10^3/uL


(4.0-11.0)


 


Red Blood Count


 5.84 x10^6/uL


(4.30-5.70)


 


Hemoglobin


 18.9 g/dL


(13.0-17.5)


 


Hematocrit


 54.9 %


(39.0-53.0)


 


Mean Corpuscular Volume 94 fL () 


 


Mean Corpuscular Hemoglobin 32 pg (25-35) 


 


Mean Corpuscular Hemoglobin


Concent 34 g/dL


(31-37)


 


Red Cell Distribution Width


 16.5 %


(11.5-14.5)


 


Platelet Count


 158 x10^3/uL


(140-400)


 


Neutrophils (%) (Auto) 64 % (31-73) 


 


Lymphocytes (%) (Auto) 14 % (24-48) 


 


Monocytes (%) (Auto) 14 % (0-9) 


 


Eosinophils (%) (Auto) 8 % (0-3) 


 


Basophils (%) (Auto) 1 % (0-3) 


 


Neutrophils # (Auto)


 4.0 x10^3uL


(1.8-7.7)


 


Lymphocytes # (Auto)


 0.8 x10^3/uL


(1.0-4.8)


 


Monocytes # (Auto)


 0.9 x10^3/uL


(0.0-1.1)


 


Eosinophils # (Auto)


 0.5 x10^3/uL


(0.0-0.7)


 


Basophils # (Auto)


 0.0 x10^3/uL


(0.0-0.2)


 


Sodium Level


 141 mmol/L


(136-145)


 


Potassium Level


 4.3 mmol/L


(3.5-5.1)


 


Chloride Level


 105 mmol/L


()


 


Carbon Dioxide Level


 24 mmol/L


(21-32)


 


Anion Gap 12 (6-14) 


 


Blood Urea Nitrogen


 20 mg/dL


(8-26)


 


Creatinine


 1.0 mg/dL


(0.7-1.3)


 


Estimated GFR


(Cockcroft-Gault) 72.3 





 


Glucose Level


 101 mg/dL


(70-99)


 


Calcium Level


 8.9 mg/dL


(8.5-10.1)








Micro





  ANAEROBIC-AEROBIC CULTURE  Preliminary  


        Preliminary report





  ANAEROBIC RES 1  Preliminary  


        Comment





        No anaerobes recovered in 48 hours.





  AEROBIC CULT  Final  


        Final report





  AEROBIC RES 1  Final  


        Klebsiella oxytoca





        4+





  AEROBIC RES 2  Final  


        Escherichia coli





        4+





  AEROBIC RES 3  Final  


        Staphylococcus aureus





        1+


        Based on susceptibility to oxacillin this isolate would be


        susceptible to:


        *Penicillinase-stable penicillins, such as:


          Cloxacillin, Dicloxacillin, Nafcillin


        *Beta-lactam combination agents, such as:


          Amoxicillin-clavulanic acid, Ampicillin-sulbactam,


          Piperacillin-tazobactam


        *Oral cephems, such as:














                               ** CONTINUED ON NEXT PAGE **


 RUN DATE: 12/26/18                                                            

     PAGE 2   


RUN TIME: 2013


                          [facility]


                       [address] [place] [address]


                        Cayden Pompa M.D., Medical Director





--------------------------------------------------------------------------------

------------





SPEC: [id number]    PATIENT: ABAD VIDALES                  [id number]  (

Continued)


--------------------------------------------------------------------------------

------------








--------------------------------------------------------------------------------

------------





  Procedure                         Result                                     

           


--------------------------------------------------------------------------------

------------





  AEROBIC RES 3  Final   (continued)


          Cefaclor, Cefdinir, Cefpodoxime, Cefprozil, Cefuroxime,


          Cephalexin, Loracarbef


        *Parenteral cephems, such as:


          Cefazolin, Cefepime, Cefotaxime, Cefotetan, Ceftaroline,


          Ceftizoxime, Ceftriaxone, Cefuroxime


        *Carbapenems, such as:


          Doripenem, Ertapenem, Imipenem, Meropenem





  ANTIMICROBIAL SUSCEPTIBILITY  Final  


        Comment





              ** S = Susceptible; I = Intermediate; R = Resistant **


                           P = Positive; N = Negative


                    MICS are expressed in micrograms per mL


           Antibiotic                 RSLT#1    RSLT#2    RSLT#3    RSLT#4


        Amoxicillin/Clavulanic Acid    S<=2      S =8


        Ampicillin                     R =R      R>=32


        Cefazolin                      R =8


        Cefepime                       S<=0.12   S<=0.12


        Ceftriaxone                    S<=0.25   S<=0.25


        Cefuroxime                     S =4      S =4


        Ciprofloxacin                  S<=0.25   S<=0.25   S<=0.5


        Clindamycin                                        S<=0.25


        Ertapenem                      S<=0.12   S<=0.12


        Erythromycin                                       S<=0.25


        Gentamicin                     S<=1      S<=1      S<=0.5


        Imipenem                       S<=0.25   S<=0.25


        Levofloxacin                   S<=0.12   S<=0.12   S<=0.12


        Linezolid                                          S =2


        Meropenem                      S<=0.25   S<=0.25


        Moxifloxacin                                       S<=0.25


        Oxacillin                                          S<=0.25


        Penicillin                                         R>=0.5


        Piperacillin/Tazobactam                  S<=4


        Quinupristin/Dalfopristin                          S<=0.25


        Rifampin                                           S<=0.5


        Tetracycline                   S<=1      S<=1      S<=1


        Tobramycin                     S<=1      S<=1


        Trimethoprim/Sulfa             S<=20     R>=320    S<=10


        Vancomycin                                         S<=0.5





  GRAM STAIN  Final  


        Final report

















                               ** CONTINUED ON NEXT PAGE **


 RUN DATE: 12/26/18                                                            

     PAGE 3   


RUN TIME: 2013


                          [facility]


                       [address] [place] [address]


                        Cayden Pompa M.D., Medical Director





--------------------------------------------------------------------------------

------------





SPEC: [id number]    PATIENT: ABAD VIDALES                  [id number]  (

Continued)


--------------------------------------------------------------------------------

------------








--------------------------------------------------------------------------------

------------





  Procedure                         Result                                     

           


--------------------------------------------------------------------------------

------------





  GRAM STAIN RES 1  Final  


        Comment





        No white blood cells seen.





  GRAM STAIN RES 2  Final  


        Comment





        Moderate number of gram negative diplococci.


        Performed at:   - [facility]


        [address], [place], [place]  [id number]


        : MEME Jackson MD, Phone:  [phone]





--------------------------------------------------------------------------------

------------





Objective


Assessment


RLE ulcerations and cellulitis


Arterial occlusive disease, vascular following. May need aorto-fem bypass graft 

and possible right fem-pop-bypass graft 


h/o DVT/PE


Tobacco dependence 


COPD





Plan


Plan of Care


continue  Zosyn d/c vanc


wound culture in process


Monitor labs/temp/renal function closely


Local wound care





if surgery not planned for this admission then change antibiotics to po 

augmentin  for 7 days











MIKA SEGOVIA MD Dec 27, 2018 09:10

## 2018-12-27 NOTE — PDOC
PROGRESS NOTES


Chief Complaint


Chief Complaint


1.  Severe bilateral PAD with RLE wound/ulceration. CTA showing extensive 

disease. Vascular surgery following; aortofemoral reconstruction planned 

pending further workup. Echo showed preserved LV systolic function. 


2.  RLE cellulitis


3.  Hypertension; low normotensive


4.  Hyperlipidemia


5.  H/o PE; tx with Eliquis. CTA with resolution


6.  Polycythemia; bone marrow biopsy 12/24


7. Dry skin





History of Present Illness


History of Present Illness


Pt seen and examined, spoke with nursing staff. Pt was awake when prior to 

interview today. Pt has no complaints today, and has an increased appetite. 

Wound Bandages are ELIESER. Surgery is proposed for Friday, if the pt's Hgb comes 

down to at least 15.  Will consider Phlebotomy towards this end.





Vitals


Vitals





Vital Signs








  Date Time  Temp Pulse Resp B/P (MAP) Pulse Ox O2 Delivery O2 Flow Rate FiO2


 


12/27/18 07:00 98.0 79 20 118/70 (86) 95 Room Air  





 98.0       


 


12/26/18 08:00       2.0 











Physical Exam


Physical Exam


GENERAL:  Resting quietly 


HEENT:  Normal conjunctivae.  Oral cavity:  Pharynx pink and moist.


NECK:  Supple.


LUNGS:  Clear to auscultation.


HEART:  S1, S2.


ABDOMEN:  Nondistended, soft and nontender with bowel sounds present.


EXTREMITIES:  No gross edema or cyanosis.  Right lower extremity rubor with


wrinkles and dry flaky skin.  He has 2 ulcers located anterolateral aspect of


the shin and dorsal aspect of the right foot with bleeding.  Dorsalis pedis


pulse difficult to palpate.  His toe nails are thick and yellow.


SKIN:  Warm without rash.


NEUROLOGIC:  Alert and oriented x 3.


General:  Alert, Oriented X3, No acute distress


Heart:  Regular rate, Normal S1, Normal S2, No murmurs


Lungs:  Clear


Abdomen:  Normal bowel sounds, Soft, No tenderness


Extremities:  No cyanosis, Other (ISCHEMIC ULCER RIGHT FOOT/ CELLULITIS DEEP 

DORSAL FOOT WOUND)


Skin:  Other (MARKED VASCULAR INSUFF CHANGES RIGHT LOWER LEG, SKIN DRY,FRAGILE)





Labs


LABS





Laboratory Tests








Test


 12/27/18


04:35


 


White Blood Count


 6.2 x10^3/uL


(4.0-11.0)


 


Red Blood Count


 5.84 x10^6/uL


(4.30-5.70)


 


Hemoglobin


 18.9 g/dL


(13.0-17.5)


 


Hematocrit


 54.9 %


(39.0-53.0)


 


Mean Corpuscular Volume 94 fL () 


 


Mean Corpuscular Hemoglobin 32 pg (25-35) 


 


Mean Corpuscular Hemoglobin


Concent 34 g/dL


(31-37)


 


Red Cell Distribution Width


 16.5 %


(11.5-14.5)


 


Platelet Count


 158 x10^3/uL


(140-400)


 


Neutrophils (%) (Auto) 64 % (31-73) 


 


Lymphocytes (%) (Auto) 14 % (24-48) 


 


Monocytes (%) (Auto) 14 % (0-9) 


 


Eosinophils (%) (Auto) 8 % (0-3) 


 


Basophils (%) (Auto) 1 % (0-3) 


 


Neutrophils # (Auto)


 4.0 x10^3uL


(1.8-7.7)


 


Lymphocytes # (Auto)


 0.8 x10^3/uL


(1.0-4.8)


 


Monocytes # (Auto)


 0.9 x10^3/uL


(0.0-1.1)


 


Eosinophils # (Auto)


 0.5 x10^3/uL


(0.0-0.7)


 


Basophils # (Auto)


 0.0 x10^3/uL


(0.0-0.2)


 


Sodium Level


 141 mmol/L


(136-145)


 


Potassium Level


 4.3 mmol/L


(3.5-5.1)


 


Chloride Level


 105 mmol/L


()


 


Carbon Dioxide Level


 24 mmol/L


(21-32)


 


Anion Gap 12 (6-14) 


 


Blood Urea Nitrogen


 20 mg/dL


(8-26)


 


Creatinine


 1.0 mg/dL


(0.7-1.3)


 


Estimated GFR


(Cockcroft-Gault) 72.3 





 


Glucose Level


 101 mg/dL


(70-99)


 


Calcium Level


 8.9 mg/dL


(8.5-10.1)











Review of Systems


Review of Systems


Denies CP


Denies SOB


Denies N/V





Assessment and Plan


Assessmemt and Plan


Assessment:


1.  Severe bilateral PAD with RLE wound/ulceration. CTA showing extensive 

disease. Vascular surgery following; aortofemoral reconstruction planned 

pending further workup. Echo showed preserved LV systolic function. 


2.  RLE cellulitis


3.  Hypertension; low normotensive


4.  Hyperlipidemia


5.  H/o PE; tx with Eliquis. CTA with resolution


6.  Polycythemia; bone marrow biopsy 12/24


7. Dry skin





Plan:


Wound care


PTOT


Home meds


Labs


Surgery proposed for Friday [Aorto Bi Femoral bypass] if Hgb comes down to 15.


D/C Disposition Pending sometime next week





Comment


Review of Relevant


I have reviewed the following items karen (where applicable) has been applied.


Labs





Laboratory Tests








Test


 12/26/18


07:35 12/27/18


04:35


 


Vancomycin Level Trough


 20.1 mcg/mL


(10.0-20.0) 





 


Vancomycin Last Dose Date 12/25/18  


 


Vancomycin Last Dose Time 2000  


 


White Blood Count


 


 6.2 x10^3/uL


(4.0-11.0)


 


Red Blood Count


 


 5.84 x10^6/uL


(4.30-5.70)


 


Hemoglobin


 


 18.9 g/dL


(13.0-17.5)


 


Hematocrit


 


 54.9 %


(39.0-53.0)


 


Mean Corpuscular Volume  94 fL () 


 


Mean Corpuscular Hemoglobin  32 pg (25-35) 


 


Mean Corpuscular Hemoglobin


Concent 


 34 g/dL


(31-37)


 


Red Cell Distribution Width


 


 16.5 %


(11.5-14.5)


 


Platelet Count


 


 158 x10^3/uL


(140-400)


 


Neutrophils (%) (Auto)  64 % (31-73) 


 


Lymphocytes (%) (Auto)  14 % (24-48) 


 


Monocytes (%) (Auto)  14 % (0-9) 


 


Eosinophils (%) (Auto)  8 % (0-3) 


 


Basophils (%) (Auto)  1 % (0-3) 


 


Neutrophils # (Auto)


 


 4.0 x10^3uL


(1.8-7.7)


 


Lymphocytes # (Auto)


 


 0.8 x10^3/uL


(1.0-4.8)


 


Monocytes # (Auto)


 


 0.9 x10^3/uL


(0.0-1.1)


 


Eosinophils # (Auto)


 


 0.5 x10^3/uL


(0.0-0.7)


 


Basophils # (Auto)


 


 0.0 x10^3/uL


(0.0-0.2)


 


Sodium Level


 


 141 mmol/L


(136-145)


 


Potassium Level


 


 4.3 mmol/L


(3.5-5.1)


 


Chloride Level


 


 105 mmol/L


()


 


Carbon Dioxide Level


 


 24 mmol/L


(21-32)


 


Anion Gap  12 (6-14) 


 


Blood Urea Nitrogen


 


 20 mg/dL


(8-26)


 


Creatinine


 


 1.0 mg/dL


(0.7-1.3)


 


Estimated GFR


(Cockcroft-Gault) 


 72.3 





 


Glucose Level


 


 101 mg/dL


(70-99)


 


Calcium Level


 


 8.9 mg/dL


(8.5-10.1)








Laboratory Tests








Test


 12/27/18


04:35


 


White Blood Count


 6.2 x10^3/uL


(4.0-11.0)


 


Red Blood Count


 5.84 x10^6/uL


(4.30-5.70)


 


Hemoglobin


 18.9 g/dL


(13.0-17.5)


 


Hematocrit


 54.9 %


(39.0-53.0)


 


Mean Corpuscular Volume 94 fL () 


 


Mean Corpuscular Hemoglobin 32 pg (25-35) 


 


Mean Corpuscular Hemoglobin


Concent 34 g/dL


(31-37)


 


Red Cell Distribution Width


 16.5 %


(11.5-14.5)


 


Platelet Count


 158 x10^3/uL


(140-400)


 


Neutrophils (%) (Auto) 64 % (31-73) 


 


Lymphocytes (%) (Auto) 14 % (24-48) 


 


Monocytes (%) (Auto) 14 % (0-9) 


 


Eosinophils (%) (Auto) 8 % (0-3) 


 


Basophils (%) (Auto) 1 % (0-3) 


 


Neutrophils # (Auto)


 4.0 x10^3uL


(1.8-7.7)


 


Lymphocytes # (Auto)


 0.8 x10^3/uL


(1.0-4.8)


 


Monocytes # (Auto)


 0.9 x10^3/uL


(0.0-1.1)


 


Eosinophils # (Auto)


 0.5 x10^3/uL


(0.0-0.7)


 


Basophils # (Auto)


 0.0 x10^3/uL


(0.0-0.2)


 


Sodium Level


 141 mmol/L


(136-145)


 


Potassium Level


 4.3 mmol/L


(3.5-5.1)


 


Chloride Level


 105 mmol/L


()


 


Carbon Dioxide Level


 24 mmol/L


(21-32)


 


Anion Gap 12 (6-14) 


 


Blood Urea Nitrogen


 20 mg/dL


(8-26)


 


Creatinine


 1.0 mg/dL


(0.7-1.3)


 


Estimated GFR


(Cockcroft-Gault) 72.3 





 


Glucose Level


 101 mg/dL


(70-99)


 


Calcium Level


 8.9 mg/dL


(8.5-10.1)








Microbiology


12/19/18 Blood Culture - Final, Complete


           NO GROWTH AFTER 5 DAYS


12/22/18 Anaerobic/Aerobic Culture - Preliminary, Resulted


           


12/22/18 Anaerobic Culture Result 1 ([facility]) - Preliminary, Resulted


           


12/22/18 Aerobic Culture - Final, Resulted


           


12/22/18 Aerobic Culture Result 1 ([facility]) - Final, Resulted


           


12/22/18 Aerobic Culture Result 2 ([facility]) - Final, Resulted


           


12/22/18 Aerobic Culture Result 3 ([facility]) - Final, Resulted


           


12/22/18 Antimicrobic Susceptibility - Final, Resulted


           


12/22/18 Gram Stain - Final, Resulted


           


12/22/18 Gram Stain Result 1 ([facility]) - Final, Resulted


           


12/22/18 Gram Stain Result 2 ([facility]) - Final, Resulted


Medications





Current Medications


Sodium Chloride 1,000 ml @  1,000 mls/hr 1X  ONCE IV  Last administered on 12/19 /18at 19:19;  Start 12/19/18 at 19:00;  Stop 12/19/18 at 19:59;  Status DC


Vancomycin HCl (Vanco Per Pharmacy) 1 each PRN DAILY  PRN MC SEE COMMENTS Last 

administered on 12/26/18at 12:35;  Start 12/19/18 at 19:45;  Stop 12/27/18 at 09

:15;  Status DC


Vancomycin HCl 2 gm/Sodium Chloride 500 ml @  250 mls/hr ONCE  ONCE IV  Last 

administered on 12/19/18at 20:00;  Start 12/19/18 at 19:45;  Stop 12/19/18 at 21

:44;  Status DC


Ondansetron HCl (Zofran) 4 mg PRN Q8HRS  PRN IV NAUSEA/VOMITING 1ST CHOICE;  

Start 12/19/18 at 21:15;  Stop 12/20/18 at 21:14;  Status DC


Sodium Chloride 1,000 ml @  125 mls/hr Q8H IV  Last administered on 12/20/18at 

20:01;  Start 12/19/18 at 21:03;  Stop 12/20/18 at 21:02;  Status DC


Fentanyl Citrate (Fentanyl 2ml Vial) 25 mcg PRN Q2HR  PRN IV SEVERE PAIN Last 

administered on 12/20/18at 13:59;  Start 12/20/18 at 01:45;  Stop 12/20/18 at 14

:39;  Status DC


Vancomycin HCl 1.25 gm/Sodium Chloride 250 ml @  167 mls/hr Q12H IV  Last 

administered on 12/26/18at 20:10;  Start 12/20/18 at 08:00;  Stop 12/27/18 at 09

:15;  Status DC


Vancomycin HCl (Vancomycin Trough Level) 1 each 1X  ONCE MC ;  Start 12/22/18 

at 07:30;  Stop 12/22/18 at 07:30;  Status DC


Lactobacillus Rhamnosus (Culturelle) 1 cap BID PO  Last administered on 12/27/ 18at 09:26;  Start 12/20/18 at 09:00


Fentanyl Citrate (Fentanyl 2ml Vial) 50 mcg PRN Q2HR  PRN IV SEVERE PAIN Last 

administered on 12/21/18at 09:05;  Start 12/20/18 at 14:45


Apixaban (Eliquis) 5 mg BID PO  Last administered on 12/23/18at 09:39;  Start 12 /20/18 at 15:00;  Stop 12/24/18 at 11:45;  Status DC


Acetaminophen/ Hydrocodone Bitart (Lortab 7.5/325) 1 tab PRN Q4HRS  PRN PO PAIN 

Last administered on 12/24/18at 05:27;  Start 12/21/18 at 11:15


Info (Anti-Coagulation Monitoring By Pharmacy) 1 each PRN DAILY  PRN MC SEE 

COMMENTS Last administered on 12/23/18at 13:08;  Start 12/21/18 at 11:30;  Stop 

12/24/18 at 11:45;  Status DC


Magnesium Hydroxide (Milk Of Magnesia) 2,400 mg PRN DAILY  PRN PO CONSTIPATION 

Last administered on 12/25/18at 21:09;  Start 12/21/18 at 14:15


Iohexol (Omnipaque 300 Mg/ml) 100 ml 1X  ONCE IV  Last administered on 12/22/ 18at 11:45;  Start 12/22/18 at 11:45;  Stop 12/22/18 at 11:46;  Status DC


Info (CONTRAST GIVEN -- Rx MONITORING) 1 each PRN DAILY  PRN MC SEE COMMENTS;  

Start 12/22/18 at 11:45;  Stop 12/24/18 at 11:33;  Status DC


Piperacillin Sod/ Tazobactam Sod 3.375 gm/Sodium Chloride 50 ml @  100 mls/hr 

Q6HRS IV  Last administered on 12/27/18at 06:06;  Start 12/22/18 at 18:30


Potassium Chloride (Klor-Con) 40 meq 1X  ONCE PO  Last administered on 12/23/ 18at 17:44;  Start 12/23/18 at 14:30;  Stop 12/23/18 at 14:31;  Status DC


Potassium Chloride (Klor-Con) 20 meq DAILYWBKFT PO  Last administered on 12/27/ 18at 09:26;  Start 12/24/18 at 08:00


Iohexol (Omnipaque 300 Mg/ml) 95 ml 1X  ONCE IV  Last administered on 12/23/ 18at 15:09;  Start 12/23/18 at 14:45;  Stop 12/23/18 at 14:47;  Status DC


Info (CONTRAST GIVEN -- Rx MONITORING) 1 each PRN DAILY  PRN MC SEE COMMENTS;  

Start 12/23/18 at 15:00;  Stop 12/25/18 at 14:59;  Status DC


Regadenoson (Lexiscan) 0.4 mg 1X  ONCE IV  Last administered on 12/24/18at 08:30

;  Start 12/24/18 at 08:30;  Stop 12/24/18 at 08:31;  Status DC


Midazolam HCl (Versed) 2 mg STK-MED ONCE .ROUTE ;  Start 12/24/18 at 09:55;  

Stop 12/24/18 at 09:56;  Status DC


Fentanyl Citrate (Fentanyl 2ml Vial) 100 mcg STK-MED ONCE .ROUTE ;  Start 12/24/ 18 at 09:55;  Stop 12/24/18 at 09:56;  Status DC


Lidocaine/Sodium Bicarbonate (Buffered Lidocaine 1%) 3 ml STK-MED ONCE .ROUTE ;

  Start 12/24/18 at 09:57;  Stop 12/24/18 at 09:58;  Status DC


Lidocaine/Sodium Bicarbonate (Buffered Lidocaine 1%) 3 ml 1X  ONCE IJ  Last 

administered on 12/24/18at 10:25;  Start 12/24/18 at 10:00;  Stop 12/24/18 at 10

:12;  Status DC


Midazolam HCl (Versed) 2 mg 1X  ONCE IV  Last administered on 12/24/18at 10:26;

  Start 12/24/18 at 10:00;  Stop 12/24/18 at 10:12;  Status DC


Fentanyl Citrate (Fentanyl 2ml Vial) 100 mcg 1X  ONCE IV  Last administered on 

12/24/18at 10:27;  Start 12/24/18 at 10:00;  Stop 12/24/18 at 10:12;  Status DC


Atorvastatin Calcium (Lipitor) 40 mg QHS PO  Last administered on 12/26/18at 20:

11;  Start 12/24/18 at 21:00


Aspirin (Ecotrin) 81 mg DAILYWBKFT PO  Last administered on 12/27/18at 09:25;  

Start 12/25/18 at 08:00


Aspirin (Ecotrin) 325 mg 1X  ONCE PO  Last administered on 12/24/18at 14:15;  

Start 12/24/18 at 14:15;  Stop 12/24/18 at 14:16;  Status DC


Multi-Ingred Cream/Lotion/Oil/ Oint (Hydrocerin Cream) 1 aurelia PRN Q1HR  PRN TP 

DRY SKIN / SCALING;  Start 12/25/18 at 09:00


Vancomycin HCl (Vancomycin Trough Level) 1 each 1X  ONCE MC ;  Start 12/26/18 

at 07:30;  Stop 12/26/18 at 07:31;  Status DC


Polyethylene Glycol (miraLAX PACKET) 17 gm 1X  ONCE PO ;  Start 12/30/18 at 09:

00;  Stop 12/30/18 at 09:01





Active Scripts


Active


Reported


Eliquis (Apixaban) 5 Mg Tablet 5 Mg PO BID


Vitals/I & O





Vital Sign - Last 24 Hours








 12/26/18 12/26/18 12/26/18 12/26/18





 15:00 19:00 20:00 22:54


 


Temp 98.6 97.8  98.6





 98.6 97.8  98.6


 


Pulse 70 78  82


 


Resp 16 18  18


 


B/P (MAP) 98/57 (71) 111/59 (76)  116/60 (78)


 


Pulse Ox 95 92  94


 


O2 Delivery Room Air Room Air Room Air Room Air


 


    





    





 12/27/18 12/27/18  





 03:00 07:00  


 


Temp 98.1 98.0  





 98.1 98.0  


 


Pulse 60 79  


 


Resp 18 20  


 


B/P (MAP) 121/72 (88) 118/70 (86)  


 


Pulse Ox 93 95  


 


O2 Delivery Room Air Room Air  














Intake and Output   


 


 12/26/18 12/26/18 12/27/18





 15:01 23:01 07:01


 


Intake Total 360 ml 370 ml 150 ml


 


Output Total 825 ml 275 ml 1550 ml


 


Balance -465 ml 95 ml -1400 ml

















REGLA WALLACE III DO Dec 27, 2018 11:43

## 2018-12-27 NOTE — PDOC
Provider Note


Provider Note


Vascular


S: Patient complains of dry skin, some tenderness right leg.


O: Awake and alert


HRR


Non-labored respirations


Abdomen soft, NTND, umbilical hernia present


Right leg with foot and shin ulcer, superficial and unchanged, extremely dry 

skin


A/P: 


F/U PVD, S/P remote rt fem-pop bypass [address] Dr Lola Medeiros lower leg wounds


CTA juan antonio common and external iliac occlusions


Imp: Severe PVD


Thrombocytopenia-Hemoglobin remains at 18


Plan: Aorto bi femoral bypass when cleared for surgery, discussed with nurse 

she has discussed with Dr. Saldana, please see his plan of care.


Will order bilateral lower and upper extremity vein mapping in anticipation 

that right leg bypass is needed.


Will likely be next week for surgical intervention. Will schedule when patient'

s Hemogram improved.











JORGE ELKINS APRN Dec 27, 2018 09:30

## 2018-12-28 VITALS — SYSTOLIC BLOOD PRESSURE: 120 MMHG | DIASTOLIC BLOOD PRESSURE: 60 MMHG

## 2018-12-28 VITALS — DIASTOLIC BLOOD PRESSURE: 59 MMHG | SYSTOLIC BLOOD PRESSURE: 116 MMHG

## 2018-12-28 VITALS — DIASTOLIC BLOOD PRESSURE: 77 MMHG | SYSTOLIC BLOOD PRESSURE: 114 MMHG

## 2018-12-28 LAB
ANION GAP SERPL CALC-SCNC: 11 MMOL/L (ref 6–14)
BASOPHILS # BLD AUTO: 0 X10^3/UL (ref 0–0.2)
BASOPHILS NFR BLD: 0 % (ref 0–3)
BUN SERPL-MCNC: 17 MG/DL (ref 8–26)
CALCIUM SERPL-MCNC: 9 MG/DL (ref 8.5–10.1)
CHLORIDE SERPL-SCNC: 106 MMOL/L (ref 98–107)
CO2 SERPL-SCNC: 23 MMOL/L (ref 21–32)
CREAT SERPL-MCNC: 1.1 MG/DL (ref 0.7–1.3)
EOSINOPHIL NFR BLD: 0.5 X10^3/UL (ref 0–0.7)
EOSINOPHIL NFR BLD: 8 % (ref 0–3)
ERYTHROCYTE [DISTWIDTH] IN BLOOD BY AUTOMATED COUNT: 16.3 % (ref 11.5–14.5)
GFR SERPLBLD BASED ON 1.73 SQ M-ARVRAT: 64.7 ML/MIN
GLUCOSE SERPL-MCNC: 104 MG/DL (ref 70–99)
HCT VFR BLD CALC: 58.7 % (ref 39–53)
HGB BLD-MCNC: 20.2 G/DL (ref 13–17.5)
LYMPHOCYTES # BLD: 1.3 X10^3/UL (ref 1–4.8)
LYMPHOCYTES NFR BLD AUTO: 21 % (ref 24–48)
MCH RBC QN AUTO: 33 PG (ref 25–35)
MCHC RBC AUTO-ENTMCNC: 35 G/DL (ref 31–37)
MCV RBC AUTO: 95 FL (ref 79–100)
MONO #: 0.9 X10^3/UL (ref 0–1.1)
MONOCYTES NFR BLD: 15 % (ref 0–9)
NEUT #: 3.4 X10^3UL (ref 1.8–7.7)
NEUTROPHILS NFR BLD AUTO: 56 % (ref 31–73)
PLATELET # BLD AUTO: 170 X10^3/UL (ref 140–400)
POTASSIUM SERPL-SCNC: 4.3 MMOL/L (ref 3.5–5.1)
RBC # BLD AUTO: 6.21 X10^6/UL (ref 4.3–5.7)
SODIUM SERPL-SCNC: 140 MMOL/L (ref 136–145)
WBC # BLD AUTO: 6.1 X10^3/UL (ref 4–11)

## 2018-12-28 RX ADMIN — PIPERACILLIN SODIUM AND TAZOBACTAM SODIUM SCH MLS/HR: 3; .375 INJECTION, POWDER, LYOPHILIZED, FOR SOLUTION INTRAVENOUS at 06:08

## 2018-12-28 RX ADMIN — Medication SCH CAP: at 08:33

## 2018-12-28 RX ADMIN — POTASSIUM CHLORIDE SCH MEQ: 1500 TABLET, EXTENDED RELEASE ORAL at 08:33

## 2018-12-28 RX ADMIN — PIPERACILLIN SODIUM AND TAZOBACTAM SODIUM SCH MLS/HR: 3; .375 INJECTION, POWDER, LYOPHILIZED, FOR SOLUTION INTRAVENOUS at 00:01

## 2018-12-28 RX ADMIN — ASPIRIN SCH MG: 81 TABLET, COATED ORAL at 08:32

## 2018-12-28 RX ADMIN — BACITRACIN SCH MLS/HR: 5000 INJECTION, POWDER, FOR SOLUTION INTRAMUSCULAR at 06:07

## 2018-12-28 NOTE — PDOC
PROGRESS NOTES


Subjective


Subjective


HPI - f/u of Polycythemia





ROS - no CP





Objective


Objective





Vital Signs








  Date Time  Temp Pulse Resp B/P (MAP) Pulse Ox O2 Delivery O2 Flow Rate FiO2


 


12/28/18 07:00 97.9 80  116/59 (78) 94   





 97.9       


 


12/28/18 03:00   18   Room Air 2.0 














Intake and Output 


 


 12/28/18





 07:01


 


Intake Total 540 ml


 


Output Total 1152 ml


 


Balance -612 ml


 


 


 


Intake Oral 540 ml


 


Output Urine Total 1150 ml


 


Stool Total 2 ml











Physical Exam


Heart:  Normal S1, Normal S2


General:  Alert, Oriented X3


Lungs:  Clear to auscultation


Neuro:  Normal speech


Psych/Mental Status:  Mental status NL





Assessment


Assessment


Problems


Medical Problems:


(1) Cellulitis of right lower leg


Status: Acute  





(2) Right foot ulcer


Status: Acute  





IMPRESSION AND PLAN:





1.  Polycythemia.  He has had elevated hemoglobin and hematocrit since 2015. 


His erythropoietin was low at 2.5 in 2015.  However, the JAK2 mutation was


negative.  I agree to proceed with further testing with next generation


sequencing for JAK2 mutation.  It appears that this was ordered by Dr. Lupe Ulloa in 2017 and I will check with Pathology if this was completed.  In


addition, he also needs a bone marrow biopsy for diagnosis of polycythemia vera


and I discussed with the patient and he agrees to proceed with a biopsy to be


done on 12/24/2018.  The patient does understand that he had a phlebotomy on


05/08/2017, but he did not understand that it is therapeutic phlebotomy and he


felt the old bag of blood was taken for testing.  I educated him that phlebotomy


was a therapeutic procedure.  In addition, he has not been compliant with


followup appointments.  He did not keep his appointment with Dr. Lupe Ulloa and


he did not keep his appointment with Dr. Karla Atkinson.  I have advised him to


be compliant for followup and management.


Hb 18.4 on 12/23/18.


Hb 18 on 12/24/18.


Hb worse at 18.9 on 12/27/18.





Bone marrow bx 12/24/18. I d/w pathology, results pending.


Proceed with phlebotomy 500 cc to bring the Hb.Hct down prior to surgery.


[facility] is not cetified for phlebotomy per RN and pathologist.


Pt declined transfer to [facility] for phlebotomy.


Plan outpatient phlebotomy if ok to discharge home and return for surgery at a 

later date.


I d/w RN.





2.  Pulmonary embolism in 02/2018.  He also has a history of DVT in the past. 


He is on Eliquis and the pulmonary embolism has resolved as of CT angiogram done


on 12/22/2018.  I discussed with Dr. Atkinson and Dr. Atkinson is considering to


discontinue. No h/o DVT, he reports blood clot in rt leg in 2004, but did not 

receive anticoagulation per pt


and hence unlikely that this was DVT.





3.  Peripheral arterial disease.  Appreciate Vascular Surgery consultation.  He


will need surgery after the hemoglobin and hematocrit are controlled and after


the workup is completed.





4. Thrombocytopenia, monitor cbc, plt better at 158 on 12/27/18.


5. Cellulitis





Comment


Review of Relevant


I have reviewed the following items karen (where applicable) has been applied.


Labs





Laboratory Tests








Test


 12/27/18


04:35 12/28/18


06:01 12/28/18


06:07


 


White Blood Count


 6.2 x10^3/uL


(4.0-11.0) 6.1 x10^3/uL


(4.0-11.0) 





 


Red Blood Count


 5.84 x10^6/uL


(4.30-5.70) 6.21 x10^6/uL


(4.30-5.70) 





 


Hemoglobin


 18.9 g/dL


(13.0-17.5) 20.2 g/dL


(13.0-17.5) 





 


Hematocrit


 54.9 %


(39.0-53.0) 58.7 %


(39.0-53.0) 





 


Mean Corpuscular Volume 94 fL ()  95 fL ()  


 


Mean Corpuscular Hemoglobin 32 pg (25-35)  33 pg (25-35)  


 


Mean Corpuscular Hemoglobin


Concent 34 g/dL


(31-37) 35 g/dL


(31-37) 





 


Red Cell Distribution Width


 16.5 %


(11.5-14.5) 16.3 %


(11.5-14.5) 





 


Platelet Count


 158 x10^3/uL


(140-400) 170 x10^3/uL


(140-400) 





 


Neutrophils (%) (Auto) 64 % (31-73)  56 % (31-73)  


 


Lymphocytes (%) (Auto) 14 % (24-48)  21 % (24-48)  


 


Monocytes (%) (Auto) 14 % (0-9)  15 % (0-9)  


 


Eosinophils (%) (Auto) 8 % (0-3)  8 % (0-3)  


 


Basophils (%) (Auto) 1 % (0-3)  0 % (0-3)  


 


Neutrophils # (Auto)


 4.0 x10^3uL


(1.8-7.7) 3.4 x10^3uL


(1.8-7.7) 





 


Lymphocytes # (Auto)


 0.8 x10^3/uL


(1.0-4.8) 1.3 x10^3/uL


(1.0-4.8) 





 


Monocytes # (Auto)


 0.9 x10^3/uL


(0.0-1.1) 0.9 x10^3/uL


(0.0-1.1) 





 


Eosinophils # (Auto)


 0.5 x10^3/uL


(0.0-0.7) 0.5 x10^3/uL


(0.0-0.7) 





 


Basophils # (Auto)


 0.0 x10^3/uL


(0.0-0.2) 0.0 x10^3/uL


(0.0-0.2) 





 


Sodium Level


 141 mmol/L


(136-145) 


 140 mmol/L


(136-145)


 


Potassium Level


 4.3 mmol/L


(3.5-5.1) 


 4.3 mmol/L


(3.5-5.1)


 


Chloride Level


 105 mmol/L


() 


 106 mmol/L


()


 


Carbon Dioxide Level


 24 mmol/L


(21-32) 


 23 mmol/L


(21-32)


 


Anion Gap 12 (6-14)   11 (6-14) 


 


Blood Urea Nitrogen


 20 mg/dL


(8-26) 


 17 mg/dL


(8-26)


 


Creatinine


 1.0 mg/dL


(0.7-1.3) 


 1.1 mg/dL


(0.7-1.3)


 


Estimated GFR


(Cockcroft-Gault) 72.3 


 


 64.7 





 


Glucose Level


 101 mg/dL


(70-99) 


 104 mg/dL


(70-99)


 


Calcium Level


 8.9 mg/dL


(8.5-10.1) 


 9.0 mg/dL


(8.5-10.1)








Laboratory Tests








Test


 12/28/18


06:01 12/28/18


06:07


 


White Blood Count


 6.1 x10^3/uL


(4.0-11.0) 





 


Red Blood Count


 6.21 x10^6/uL


(4.30-5.70) 





 


Hemoglobin


 20.2 g/dL


(13.0-17.5) 





 


Hematocrit


 58.7 %


(39.0-53.0) 





 


Mean Corpuscular Volume 95 fL ()  


 


Mean Corpuscular Hemoglobin 33 pg (25-35)  


 


Mean Corpuscular Hemoglobin


Concent 35 g/dL


(31-37) 





 


Red Cell Distribution Width


 16.3 %


(11.5-14.5) 





 


Platelet Count


 170 x10^3/uL


(140-400) 





 


Neutrophils (%) (Auto) 56 % (31-73)  


 


Lymphocytes (%) (Auto) 21 % (24-48)  


 


Monocytes (%) (Auto) 15 % (0-9)  


 


Eosinophils (%) (Auto) 8 % (0-3)  


 


Basophils (%) (Auto) 0 % (0-3)  


 


Neutrophils # (Auto)


 3.4 x10^3uL


(1.8-7.7) 





 


Lymphocytes # (Auto)


 1.3 x10^3/uL


(1.0-4.8) 





 


Monocytes # (Auto)


 0.9 x10^3/uL


(0.0-1.1) 





 


Eosinophils # (Auto)


 0.5 x10^3/uL


(0.0-0.7) 





 


Basophils # (Auto)


 0.0 x10^3/uL


(0.0-0.2) 





 


Sodium Level


 


 140 mmol/L


(136-145)


 


Potassium Level


 


 4.3 mmol/L


(3.5-5.1)


 


Chloride Level


 


 106 mmol/L


()


 


Carbon Dioxide Level


 


 23 mmol/L


(21-32)


 


Anion Gap  11 (6-14) 


 


Blood Urea Nitrogen


 


 17 mg/dL


(8-26)


 


Creatinine


 


 1.1 mg/dL


(0.7-1.3)


 


Estimated GFR


(Cockcroft-Gault) 


 64.7 





 


Glucose Level


 


 104 mg/dL


(70-99)


 


Calcium Level


 


 9.0 mg/dL


(8.5-10.1)








Microbiology


12/19/18 Blood Culture - Final, Complete


           NO GROWTH AFTER 5 DAYS


12/22/18 Anaerobic/Aerobic Culture - Final, Complete


           


12/22/18 Anaerobic Culture Result 1 ([facility]) - Final, Complete


           


12/22/18 Aerobic Culture - Final, Complete


           


12/22/18 Aerobic Culture Result 1 ([facility]) - Final, Complete


           


12/22/18 Aerobic Culture Result 2 ([facility]) - Final, Complete


           


12/22/18 Aerobic Culture Result 3 ([facility]) - Final, Complete


           


12/22/18 Antimicrobic Susceptibility - Final, Complete


           


12/22/18 Gram Stain - Final, Complete


           


12/22/18 Gram Stain Result 1 (SHAMA) - Final, Complete


           


12/22/18 Gram Stain Result 2 ([facility]) - Final, Complete


Medications





Current Medications


Sodium Chloride 1,000 ml @  1,000 mls/hr 1X  ONCE IV  Last administered on 12/19 /18at 19:19;  Start 12/19/18 at 19:00;  Stop 12/19/18 at 19:59;  Status DC


Vancomycin HCl (Vanco Per Pharmacy) 1 each PRN DAILY  PRN MC SEE COMMENTS Last 

administered on 12/26/18at 12:35;  Start 12/19/18 at 19:45;  Stop 12/27/18 at 09

:15;  Status DC


Vancomycin HCl 2 gm/Sodium Chloride 500 ml @  250 mls/hr ONCE  ONCE IV  Last 

administered on 12/19/18at 20:00;  Start 12/19/18 at 19:45;  Stop 12/19/18 at 21

:44;  Status DC


Ondansetron HCl (Zofran) 4 mg PRN Q8HRS  PRN IV NAUSEA/VOMITING 1ST CHOICE;  

Start 12/19/18 at 21:15;  Stop 12/20/18 at 21:14;  Status DC


Sodium Chloride 1,000 ml @  125 mls/hr Q8H IV  Last administered on 12/20/18at 

20:01;  Start 12/19/18 at 21:03;  Stop 12/20/18 at 21:02;  Status DC


Fentanyl Citrate (Fentanyl 2ml Vial) 25 mcg PRN Q2HR  PRN IV SEVERE PAIN Last 

administered on 12/20/18at 13:59;  Start 12/20/18 at 01:45;  Stop 12/20/18 at 14

:39;  Status DC


Vancomycin HCl 1.25 gm/Sodium Chloride 250 ml @  167 mls/hr Q12H IV  Last 

administered on 12/26/18at 20:10;  Start 12/20/18 at 08:00;  Stop 12/27/18 at 09

:15;  Status DC


Vancomycin HCl (Vancomycin Trough Level) 1 each 1X  ONCE MC ;  Start 12/22/18 

at 07:30;  Stop 12/22/18 at 07:30;  Status DC


Lactobacillus Rhamnosus (Culturelle) 1 cap BID PO  Last administered on 12/28/ 18at 08:33;  Start 12/20/18 at 09:00


Fentanyl Citrate (Fentanyl 2ml Vial) 50 mcg PRN Q2HR  PRN IV SEVERE PAIN Last 

administered on 12/21/18at 09:05;  Start 12/20/18 at 14:45


Apixaban (Eliquis) 5 mg BID PO  Last administered on 12/23/18at 09:39;  Start 12 /20/18 at 15:00;  Stop 12/24/18 at 11:45;  Status DC


Acetaminophen/ Hydrocodone Bitart (Lortab 7.5/325) 1 tab PRN Q4HRS  PRN PO PAIN 

Last administered on 12/24/18at 05:27;  Start 12/21/18 at 11:15


Info (Anti-Coagulation Monitoring By Pharmacy) 1 each PRN DAILY  PRN MC SEE 

COMMENTS Last administered on 12/23/18at 13:08;  Start 12/21/18 at 11:30;  Stop 

12/24/18 at 11:45;  Status DC


Magnesium Hydroxide (Milk Of Magnesia) 2,400 mg PRN DAILY  PRN PO CONSTIPATION 

Last administered on 12/25/18at 21:09;  Start 12/21/18 at 14:15


Iohexol (Omnipaque 300 Mg/ml) 100 ml 1X  ONCE IV  Last administered on 12/22/ 18at 11:45;  Start 12/22/18 at 11:45;  Stop 12/22/18 at 11:46;  Status DC


Info (CONTRAST GIVEN -- Rx MONITORING) 1 each PRN DAILY  PRN MC SEE COMMENTS;  

Start 12/22/18 at 11:45;  Stop 12/24/18 at 11:33;  Status DC


Piperacillin Sod/ Tazobactam Sod 3.375 gm/Sodium Chloride 50 ml @  100 mls/hr 

Q6HRS IV  Last administered on 12/28/18at 06:08;  Start 12/22/18 at 18:30


Potassium Chloride (Klor-Con) 40 meq 1X  ONCE PO  Last administered on 12/23/ 18at 17:44;  Start 12/23/18 at 14:30;  Stop 12/23/18 at 14:31;  Status DC


Potassium Chloride (Klor-Con) 20 meq DAILYWBKFT PO  Last administered on 12/28/ 18at 08:33;  Start 12/24/18 at 08:00


Iohexol (Omnipaque 300 Mg/ml) 95 ml 1X  ONCE IV  Last administered on 12/23/ 18at 15:09;  Start 12/23/18 at 14:45;  Stop 12/23/18 at 14:47;  Status DC


Info (CONTRAST GIVEN -- Rx MONITORING) 1 each PRN DAILY  PRN MC SEE COMMENTS;  

Start 12/23/18 at 15:00;  Stop 12/25/18 at 14:59;  Status DC


Regadenoson (Lexiscan) 0.4 mg 1X  ONCE IV  Last administered on 12/24/18at 08:30

;  Start 12/24/18 at 08:30;  Stop 12/24/18 at 08:31;  Status DC


Midazolam HCl (Versed) 2 mg STK-MED ONCE .ROUTE ;  Start 12/24/18 at 09:55;  

Stop 12/24/18 at 09:56;  Status DC


Fentanyl Citrate (Fentanyl 2ml Vial) 100 mcg STK-MED ONCE .ROUTE ;  Start 12/24/ 18 at 09:55;  Stop 12/24/18 at 09:56;  Status DC


Lidocaine/Sodium Bicarbonate (Buffered Lidocaine 1%) 3 ml STK-MED ONCE .ROUTE ;

  Start 12/24/18 at 09:57;  Stop 12/24/18 at 09:58;  Status DC


Lidocaine/Sodium Bicarbonate (Buffered Lidocaine 1%) 3 ml 1X  ONCE IJ  Last 

administered on 12/24/18at 10:25;  Start 12/24/18 at 10:00;  Stop 12/24/18 at 10

:12;  Status DC


Midazolam HCl (Versed) 2 mg 1X  ONCE IV  Last administered on 12/24/18at 10:26;

  Start 12/24/18 at 10:00;  Stop 12/24/18 at 10:12;  Status DC


Fentanyl Citrate (Fentanyl 2ml Vial) 100 mcg 1X  ONCE IV  Last administered on 

12/24/18at 10:27;  Start 12/24/18 at 10:00;  Stop 12/24/18 at 10:12;  Status DC


Atorvastatin Calcium (Lipitor) 40 mg QHS PO  Last administered on 12/27/18at 21:

22;  Start 12/24/18 at 21:00


Aspirin (Ecotrin) 81 mg DAILYWBKFT PO  Last administered on 12/28/18at 08:32;  

Start 12/25/18 at 08:00


Aspirin (Ecotrin) 325 mg 1X  ONCE PO  Last administered on 12/24/18at 14:15;  

Start 12/24/18 at 14:15;  Stop 12/24/18 at 14:16;  Status DC


Multi-Ingred Cream/Lotion/Oil/ Oint (Hydrocerin Cream) 1 aurelia PRN Q1HR  PRN TP 

DRY SKIN / SCALING;  Start 12/25/18 at 09:00


Vancomycin HCl (Vancomycin Trough Level) 1 each 1X  ONCE MC ;  Start 12/26/18 

at 07:30;  Stop 12/26/18 at 07:31;  Status DC


Polyethylene Glycol (miraLAX PACKET) 17 gm 1X  ONCE PO ;  Start 12/30/18 at 09:

00;  Stop 12/30/18 at 09:01


Sodium Chloride 1,000 ml @  75 mls/hr [id number] IV  Last administered on 12/28/ 18at 06:07;  Start 12/27/18 at 16:00





Active Scripts


Active


Reported


Eliquis (Apixaban) 5 Mg Tablet 5 Mg PO BID


Vitals/I & O





Vital Sign - Last 24 Hours








 12/27/18 12/27/18 12/27/18 12/27/18





 15:00 19:00 20:00 23:00


 


Temp 98.1 98.3  97.7





 98.1 98.3  97.7


 


Pulse 78 70  79


 


Resp 18 18  18


 


B/P (MAP) 122/62 (82) 111/69 (83)  133/75 (94)


 


Pulse Ox 94 94  93


 


O2 Delivery Room Air Room Air Room Air Room Air


 


O2 Flow Rate   2.0 


 


    





    





 12/28/18 12/28/18  





 03:00 07:00  


 


Temp 98.1 97.9  





 98.1 97.9  


 


Pulse 73 80  


 


Resp 18   


 


B/P (MAP) 114/77 (89) 116/59 (78)  


 


Pulse Ox 95 94  


 


O2 Delivery Room Air   


 


O2 Flow Rate 2.0   














Intake and Output   


 


 12/27/18 12/27/18 12/28/18





 15:01 23:01 07:01


 


Intake Total 360 ml 180 ml 


 


Output Total 750 ml 200 ml 202 ml


 


Balance -390 ml -20 ml -202 ml

















LUCIUS GREY MD Dec 28, 2018 09:38

## 2018-12-28 NOTE — DISCH
DISCHARGE WITH HOME HEALTH


DISCHARGE INFORMATION:


Discharge Date:  Dec 28, 2018


Final Diagnosis:


Problems


Medical Problems:


(1) Cellulitis of right lower leg


Status: Acute  





(2) Right foot ulcer


Status: Acute  








Condition on Discharge:  Stable





CODE STATUS:


Code Status:  Full





HOME HEALTH:


Face to Face:


I certify this patient is under my care and that I, or a nurse practitioner or 

physician's assistant working with me, had a face to face encounter that meets 

the physician face to face encounter requirements with this patient on 12/28


Skilled Nursing For:  Medication Management, Wound Care


Physical Therapy For:  Evalulation/Treatment


Occupational Therapy For:  Evaluation/Treatment





POST DISCHARGE ORDERS:


Activity Instructions for Disc:  Activity as tolerated


DIET AFTER DISCHARGE:  Regular





TREATMENT/EQUIPMENT ORDERS:


Adaptive Equipment Issued:  None





CERTIFICATION STATEMENT:


Certification Statement:


Certification Statement: Based on the above finding, I certify that this 

patient is confined to the home and needs intermittent skilled nursing care, 

physical therapy and/or speech therapy, or continues to need occupational 

therapy.~ This patient is under my care, and I have initiated the establishment 

of the plan of care.~ This patient will be followed by myself or a community 

physician who will periodically review the plan of care.


Home Meds


Reported Medications


Apixaban (ELIQUIS) 5 Mg Tablet, 5 MG PO BID, TAB


   3/1/18











KATIE BECKHAM MD Dec 28, 2018 10:26

## 2018-12-28 NOTE — PDOC
Infectious Disease Note


Subjective


Subjective


Feeling alright this morning


Denies pain/F/C/N/V/D/SOA





Vital Sign


Vital Signs





Vital Signs








  Date Time  Temp Pulse Resp B/P (MAP) Pulse Ox O2 Delivery O2 Flow Rate FiO2


 


12/28/18 07:00 97.9 80  116/59 (78) 94   





 97.9       


 


12/28/18 03:00   18   Room Air 2.0 











Physical Exam


PHYSICAL EXAM


GENERAL:  Resting quietly 


HEENT:  Normal conjunctivae.  Oral cavity:  Pharynx pink and moist.


NECK:  Supple.


LUNGS:  Clear to auscultation.


HEART:  S1, S2.


ABDOMEN:  Nondistended, soft and nontender with bowel sounds present.


EXTREMITIES:  No gross edema or cyanosis.  Right lower extremity rubor with


wrinkles and dry flaky skin.  He has 2 ulcers located anterolateral aspect of


the shin and dorsal aspect of the right foot with bleeding.  Dorsalis pedis


pulse difficult to palpate.  His toe nails are thick and yellow.


SKIN:  Warm without rash.


NEUROLOGIC:  Alert and oriented x 3.





Labs


Lab





Laboratory Tests








Test


 12/28/18


06:01 12/28/18


06:07


 


White Blood Count


 6.1 x10^3/uL


(4.0-11.0) 





 


Red Blood Count


 6.21 x10^6/uL


(4.30-5.70) 





 


Hemoglobin


 20.2 g/dL


(13.0-17.5) 





 


Hematocrit


 58.7 %


(39.0-53.0) 





 


Mean Corpuscular Volume 95 fL ()  


 


Mean Corpuscular Hemoglobin 33 pg (25-35)  


 


Mean Corpuscular Hemoglobin


Concent 35 g/dL


(31-37) 





 


Red Cell Distribution Width


 16.3 %


(11.5-14.5) 





 


Platelet Count


 170 x10^3/uL


(140-400) 





 


Neutrophils (%) (Auto) 56 % (31-73)  


 


Lymphocytes (%) (Auto) 21 % (24-48)  


 


Monocytes (%) (Auto) 15 % (0-9)  


 


Eosinophils (%) (Auto) 8 % (0-3)  


 


Basophils (%) (Auto) 0 % (0-3)  


 


Neutrophils # (Auto)


 3.4 x10^3uL


(1.8-7.7) 





 


Lymphocytes # (Auto)


 1.3 x10^3/uL


(1.0-4.8) 





 


Monocytes # (Auto)


 0.9 x10^3/uL


(0.0-1.1) 





 


Eosinophils # (Auto)


 0.5 x10^3/uL


(0.0-0.7) 





 


Basophils # (Auto)


 0.0 x10^3/uL


(0.0-0.2) 





 


Sodium Level


 


 140 mmol/L


(136-145)


 


Potassium Level


 


 4.3 mmol/L


(3.5-5.1)


 


Chloride Level


 


 106 mmol/L


()


 


Carbon Dioxide Level


 


 23 mmol/L


(21-32)


 


Anion Gap  11 (6-14) 


 


Blood Urea Nitrogen


 


 17 mg/dL


(8-26)


 


Creatinine


 


 1.1 mg/dL


(0.7-1.3)


 


Estimated GFR


(Cockcroft-Gault) 


 64.7 





 


Glucose Level


 


 104 mg/dL


(70-99)


 


Calcium Level


 


 9.0 mg/dL


(8.5-10.1)








Micro





  ANAEROBIC-AEROBIC CULTURE  Preliminary  


        Preliminary report





  ANAEROBIC RES 1  Preliminary  


        Comment





        No anaerobes recovered in 48 hours.





  AEROBIC CULT  Final  


        Final report





  AEROBIC RES 1  Final  


        Klebsiella oxytoca





        4+





  AEROBIC RES 2  Final  


        Escherichia coli





        4+





  AEROBIC RES 3  Final  


        Staphylococcus aureus





        1+


        Based on susceptibility to oxacillin this isolate would be


        susceptible to:


        *Penicillinase-stable penicillins, such as:


          Cloxacillin, Dicloxacillin, Nafcillin


        *Beta-lactam combination agents, such as:


          Amoxicillin-clavulanic acid, Ampicillin-sulbactam,


          Piperacillin-tazobactam


        *Oral cephems, such as:














                               ** CONTINUED ON NEXT PAGE **


 RUN DATE: 12/26/18                                                            

     PAGE 2   


RUN TIME: 2013


                          [facility]


                       [address] [place] [id number]


                        Cayden Pompa M.D., Medical Director





--------------------------------------------------------------------------------

------------





SPEC: [id number]    PATIENT: ABAD VIDALES                  [id number]  (

Continued)


--------------------------------------------------------------------------------

------------








--------------------------------------------------------------------------------

------------





  Procedure                         Result                                     

           


--------------------------------------------------------------------------------

------------





  AEROBIC RES 3  Final   (continued)


          Cefaclor, Cefdinir, Cefpodoxime, Cefprozil, Cefuroxime,


          Cephalexin, Loracarbef


        *Parenteral cephems, such as:


          Cefazolin, Cefepime, Cefotaxime, Cefotetan, Ceftaroline,


          Ceftizoxime, Ceftriaxone, Cefuroxime


        *Carbapenems, such as:


          Doripenem, Ertapenem, Imipenem, Meropenem





  ANTIMICROBIAL SUSCEPTIBILITY  Final  


        Comment





              ** S = Susceptible; I = Intermediate; R = Resistant **


                           P = Positive; N = Negative


                    MICS are expressed in micrograms per mL


           Antibiotic                 RSLT#1    RSLT#2    RSLT#3    RSLT#4


        Amoxicillin/Clavulanic Acid    S<=2      S =8


        Ampicillin                     R =R      R>=32


        Cefazolin                      R =8


        Cefepime                       S<=0.12   S<=0.12


        Ceftriaxone                    S<=0.25   S<=0.25


        Cefuroxime                     S =4      S =4


        Ciprofloxacin                  S<=0.25   S<=0.25   S<=0.5


        Clindamycin                                        S<=0.25


        Ertapenem                      S<=0.12   S<=0.12


        Erythromycin                                       S<=0.25


        Gentamicin                     S<=1      S<=1      S<=0.5


        Imipenem                       S<=0.25   S<=0.25


        Levofloxacin                   S<=0.12   S<=0.12   S<=0.12


        Linezolid                                          S =2


        Meropenem                      S<=0.25   S<=0.25


        Moxifloxacin                                       S<=0.25


        Oxacillin                                          S<=0.25


        Penicillin                                         R>=0.5


        Piperacillin/Tazobactam                  S<=4


        Quinupristin/Dalfopristin                          S<=0.25


        Rifampin                                           S<=0.5


        Tetracycline                   S<=1      S<=1      S<=1


        Tobramycin                     S<=1      S<=1


        Trimethoprim/Sulfa             S<=20     R>=320    S<=10


        Vancomycin                                         S<=0.5





  GRAM STAIN  Final  


        Final report

















                               ** CONTINUED ON NEXT PAGE **


 RUN DATE: 12/26/18                                                            

     PAGE 3   


RUN TIME: 2013


                          [facility]


                       [address] [place] [address]


                        Cayden Pompa M.D., Medical Director





--------------------------------------------------------------------------------

------------





SPEC: [id number]    PATIENT: ABAD VIDALES                  [id number]  (

Continued)


--------------------------------------------------------------------------------

------------








--------------------------------------------------------------------------------

------------





  Procedure                         Result                                     

           


--------------------------------------------------------------------------------

------------





  GRAM STAIN RES 1  Final  


        Comment





        No white blood cells seen.





  GRAM STAIN RES 2  Final  


        Comment





        Moderate number of gram negative diplococci.


        Performed at:  [facility] [address], [place], [place]  [id number]


        : MEME Jackson MD, Phone:  [phone]





--------------------------------------------------------------------------------

------------





Objective


Assessment


RLE ulcerations and cellulitis


Arterial occlusive disease, vascular following. May need aorto-fem bypass graft 

and possible right fem-pop-bypass graft 


h/o DVT/PE


Tobacco dependence 


COPD





Plan


Plan of Care


change antibiotics to po levaquin for d/c 


wound culture in process


Monitor labs/temp/renal function closely


Local wound care











MIKA SEGOVIA MD Dec 28, 2018 10:09

## 2018-12-28 NOTE — PDOC
Provider Note


Provider Note


Vascular F/U


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


Waldemar lower ext non healing ulcers, 


Can't draw off blood here? 


Tentatively scheduled for an aorto bi. femoral bypass on Monday  can move to 

Wed. if not ready by Monday, nature of the procedure and risks explained











FELIPE SALEH MD Dec 28, 2018 08:54

## 2018-12-28 NOTE — PDOC
PULMONARY PROGRESS NOTES


Subjective


PT NOT MORE SOA


Vitals





Vital Signs








  Date Time  Temp Pulse Resp B/P (MAP) Pulse Ox O2 Delivery O2 Flow Rate FiO2


 


12/28/18 07:00 97.9 80  116/59 (78) 94   





 97.9       


 


12/28/18 03:00   18   Room Air 2.0 








ROS:  No Nausea, No Chest Pain, No Abdominal Pain, No Increase Cough


Lungs:  Clear


Cardiovascular:  S1, S2


Abdomen:  Soft


Neuro Exam:  Alert


Extremities:  No Edema


Skin:  Warm


Labs





Laboratory Tests








Test


 12/27/18


04:35 12/28/18


06:01 12/28/18


06:07


 


White Blood Count


 6.2 x10^3/uL


(4.0-11.0) 6.1 x10^3/uL


(4.0-11.0) 





 


Red Blood Count


 5.84 x10^6/uL


(4.30-5.70) 6.21 x10^6/uL


(4.30-5.70) 





 


Hemoglobin


 18.9 g/dL


(13.0-17.5) 20.2 g/dL


(13.0-17.5) 





 


Hematocrit


 54.9 %


(39.0-53.0) 58.7 %


(39.0-53.0) 





 


Mean Corpuscular Volume 94 fL ()  95 fL ()  


 


Mean Corpuscular Hemoglobin 32 pg (25-35)  33 pg (25-35)  


 


Mean Corpuscular Hemoglobin


Concent 34 g/dL


(31-37) 35 g/dL


(31-37) 





 


Red Cell Distribution Width


 16.5 %


(11.5-14.5) 16.3 %


(11.5-14.5) 





 


Platelet Count


 158 x10^3/uL


(140-400) 170 x10^3/uL


(140-400) 





 


Neutrophils (%) (Auto) 64 % (31-73)  56 % (31-73)  


 


Lymphocytes (%) (Auto) 14 % (24-48)  21 % (24-48)  


 


Monocytes (%) (Auto) 14 % (0-9)  15 % (0-9)  


 


Eosinophils (%) (Auto) 8 % (0-3)  8 % (0-3)  


 


Basophils (%) (Auto) 1 % (0-3)  0 % (0-3)  


 


Neutrophils # (Auto)


 4.0 x10^3uL


(1.8-7.7) 3.4 x10^3uL


(1.8-7.7) 





 


Lymphocytes # (Auto)


 0.8 x10^3/uL


(1.0-4.8) 1.3 x10^3/uL


(1.0-4.8) 





 


Monocytes # (Auto)


 0.9 x10^3/uL


(0.0-1.1) 0.9 x10^3/uL


(0.0-1.1) 





 


Eosinophils # (Auto)


 0.5 x10^3/uL


(0.0-0.7) 0.5 x10^3/uL


(0.0-0.7) 





 


Basophils # (Auto)


 0.0 x10^3/uL


(0.0-0.2) 0.0 x10^3/uL


(0.0-0.2) 





 


Sodium Level


 141 mmol/L


(136-145) 


 140 mmol/L


(136-145)


 


Potassium Level


 4.3 mmol/L


(3.5-5.1) 


 4.3 mmol/L


(3.5-5.1)


 


Chloride Level


 105 mmol/L


() 


 106 mmol/L


()


 


Carbon Dioxide Level


 24 mmol/L


(21-32) 


 23 mmol/L


(21-32)


 


Anion Gap 12 (6-14)   11 (6-14) 


 


Blood Urea Nitrogen


 20 mg/dL


(8-26) 


 17 mg/dL


(8-26)


 


Creatinine


 1.0 mg/dL


(0.7-1.3) 


 1.1 mg/dL


(0.7-1.3)


 


Estimated GFR


(Cockcroft-Gault) 72.3 


 


 64.7 





 


Glucose Level


 101 mg/dL


(70-99) 


 104 mg/dL


(70-99)


 


Calcium Level


 8.9 mg/dL


(8.5-10.1) 


 9.0 mg/dL


(8.5-10.1)








Laboratory Tests








Test


 12/28/18


06:01 12/28/18


06:07


 


White Blood Count


 6.1 x10^3/uL


(4.0-11.0) 





 


Red Blood Count


 6.21 x10^6/uL


(4.30-5.70) 





 


Hemoglobin


 20.2 g/dL


(13.0-17.5) 





 


Hematocrit


 58.7 %


(39.0-53.0) 





 


Mean Corpuscular Volume 95 fL ()  


 


Mean Corpuscular Hemoglobin 33 pg (25-35)  


 


Mean Corpuscular Hemoglobin


Concent 35 g/dL


(31-37) 





 


Red Cell Distribution Width


 16.3 %


(11.5-14.5) 





 


Platelet Count


 170 x10^3/uL


(140-400) 





 


Neutrophils (%) (Auto) 56 % (31-73)  


 


Lymphocytes (%) (Auto) 21 % (24-48)  


 


Monocytes (%) (Auto) 15 % (0-9)  


 


Eosinophils (%) (Auto) 8 % (0-3)  


 


Basophils (%) (Auto) 0 % (0-3)  


 


Neutrophils # (Auto)


 3.4 x10^3uL


(1.8-7.7) 





 


Lymphocytes # (Auto)


 1.3 x10^3/uL


(1.0-4.8) 





 


Monocytes # (Auto)


 0.9 x10^3/uL


(0.0-1.1) 





 


Eosinophils # (Auto)


 0.5 x10^3/uL


(0.0-0.7) 





 


Basophils # (Auto)


 0.0 x10^3/uL


(0.0-0.2) 





 


Sodium Level


 


 140 mmol/L


(136-145)


 


Potassium Level


 


 4.3 mmol/L


(3.5-5.1)


 


Chloride Level


 


 106 mmol/L


()


 


Carbon Dioxide Level


 


 23 mmol/L


(21-32)


 


Anion Gap  11 (6-14) 


 


Blood Urea Nitrogen


 


 17 mg/dL


(8-26)


 


Creatinine


 


 1.1 mg/dL


(0.7-1.3)


 


Estimated GFR


(Cockcroft-Gault) 


 64.7 





 


Glucose Level


 


 104 mg/dL


(70-99)


 


Calcium Level


 


 9.0 mg/dL


(8.5-10.1)








Medications





Active Scripts








 Medications  Dose


 Route/Sig


 Max Daily Dose Days Date Category


 


 Eliquis


  (Apixaban) 5 Mg


 Tablet  5 Mg


 PO BID


   3/1/18 Reported











Impression


.


IMPRESSION:


1.  History of pulmonary embolism.


2.  Chronic obstructive pulmonary disease.


3.  Tobacco dependence.


4.  Peripheral vascular disease.


5.  Chronic obstructive pulmonary disease.


6.  Ischemic ulceration of the right foot and right shin.














CT CHEST


IMPRESSION:


1. No PE.


2. No pneumonia.





Plan


.


RESP STATUS IS COMPENSATED


FOLLOW UP ON BONE MARROW





NO PE NO NEED FOR CONTINUED ANTICOAGULATION


IF SURGERY IS NEEDED OK BY TALIA STANTON MD Dec 28, 2018 10:32

## 2018-12-28 NOTE — PDOC
PROGRESS NOTES


Chief Complaint


Chief Complaint


1.  Severe bilateral PAD with RLE wound/ulceration. CTA showing extensive 

disease. Vascular surgery following; aortofemoral reconstruction planned 

pending further workup. Echo showed preserved LV systolic function. 


2.  RLE cellulitis


3.  Hypertension; low normotensive


4.  Hyperlipidemia


5.  H/o PE; tx with Eliquis. CTA with resolution


6.  Polycythemia; bone marrow biopsy 12/24


7.   Dry skin





History of Present Illness


History of Present Illness


Pt seen and examined, spoke with nursing staff. Pt was awake when prior to 

interview today. Pt has no complaints today, and has an increased appetite. 

Wound Bandages are ELIESER. Surgery is proposed for Friday, if the pt's Hgb comes 

down to at least 15.  Will consider Phlebotomy towards this end.





Vitals


Vitals





Vital Signs








  Date Time  Temp Pulse Resp B/P (MAP) Pulse Ox O2 Delivery O2 Flow Rate FiO2


 


12/28/18 07:00 97.9 80  116/59 (78) 94   





 97.9       


 


12/28/18 03:00   18   Room Air 2.0 











Physical Exam


Physical Exam


GENERAL:  Resting quietly 


HEENT:  Normal conjunctivae.  Oral cavity:  Pharynx pink and moist.


NECK:  Supple.


LUNGS:  Clear to auscultation.


HEART:  S1, S2.


ABDOMEN:  Nondistended, soft and nontender with bowel sounds present.


EXTREMITIES:  No gross edema or cyanosis.  Right lower extremity rubor with


wrinkles and dry flaky skin.  He has 2 ulcers located anterolateral aspect of


the shin and dorsal aspect of the right foot with bleeding.  Dorsalis pedis


pulse difficult to palpate.  His toe nails are thick and yellow.


SKIN:  Warm without rash.


NEUROLOGIC:  Alert and oriented x 3.


General:  Alert, Oriented X3


Heart:  Normal S1, Normal S2


Lungs:  Clear


Abdomen:  Normal bowel sounds, Soft, No tenderness


Extremities:  No cyanosis, Other (ISCHEMIC ULCER RIGHT FOOT/ CELLULITIS DEEP 

DORSAL FOOT WOUND)


Skin:  No rashes





Labs


LABS





Laboratory Tests








Test


 12/28/18


06:01 12/28/18


06:07


 


White Blood Count


 6.1 x10^3/uL


(4.0-11.0) 





 


Red Blood Count


 6.21 x10^6/uL


(4.30-5.70) 





 


Hemoglobin


 20.2 g/dL


(13.0-17.5) 





 


Hematocrit


 58.7 %


(39.0-53.0) 





 


Mean Corpuscular Volume 95 fL ()  


 


Mean Corpuscular Hemoglobin 33 pg (25-35)  


 


Mean Corpuscular Hemoglobin


Concent 35 g/dL


(31-37) 





 


Red Cell Distribution Width


 16.3 %


(11.5-14.5) 





 


Platelet Count


 170 x10^3/uL


(140-400) 





 


Neutrophils (%) (Auto) 56 % (31-73)  


 


Lymphocytes (%) (Auto) 21 % (24-48)  


 


Monocytes (%) (Auto) 15 % (0-9)  


 


Eosinophils (%) (Auto) 8 % (0-3)  


 


Basophils (%) (Auto) 0 % (0-3)  


 


Neutrophils # (Auto)


 3.4 x10^3uL


(1.8-7.7) 





 


Lymphocytes # (Auto)


 1.3 x10^3/uL


(1.0-4.8) 





 


Monocytes # (Auto)


 0.9 x10^3/uL


(0.0-1.1) 





 


Eosinophils # (Auto)


 0.5 x10^3/uL


(0.0-0.7) 





 


Basophils # (Auto)


 0.0 x10^3/uL


(0.0-0.2) 





 


Sodium Level


 


 140 mmol/L


(136-145)


 


Potassium Level


 


 4.3 mmol/L


(3.5-5.1)


 


Chloride Level


 


 106 mmol/L


()


 


Carbon Dioxide Level


 


 23 mmol/L


(21-32)


 


Anion Gap  11 (6-14) 


 


Blood Urea Nitrogen


 


 17 mg/dL


(8-26)


 


Creatinine


 


 1.1 mg/dL


(0.7-1.3)


 


Estimated GFR


(Cockcroft-Gault) 


 64.7 





 


Glucose Level


 


 104 mg/dL


(70-99)


 


Calcium Level


 


 9.0 mg/dL


(8.5-10.1)











Assessment and Plan


Assessmemt and Plan


Problems


Medical Problems:


(1) Cellulitis of right lower leg


Status: Acute  





(2) Right foot ulcer


Status: Acute  











Comment


Review of Relevant


I have reviewed the following items karen (where applicable) has been applied.


Labs





Laboratory Tests








Test


 12/27/18


04:35 12/28/18


06:01 12/28/18


06:07


 


White Blood Count


 6.2 x10^3/uL


(4.0-11.0) 6.1 x10^3/uL


(4.0-11.0) 





 


Red Blood Count


 5.84 x10^6/uL


(4.30-5.70) 6.21 x10^6/uL


(4.30-5.70) 





 


Hemoglobin


 18.9 g/dL


(13.0-17.5) 20.2 g/dL


(13.0-17.5) 





 


Hematocrit


 54.9 %


(39.0-53.0) 58.7 %


(39.0-53.0) 





 


Mean Corpuscular Volume 94 fL ()  95 fL ()  


 


Mean Corpuscular Hemoglobin 32 pg (25-35)  33 pg (25-35)  


 


Mean Corpuscular Hemoglobin


Concent 34 g/dL


(31-37) 35 g/dL


(31-37) 





 


Red Cell Distribution Width


 16.5 %


(11.5-14.5) 16.3 %


(11.5-14.5) 





 


Platelet Count


 158 x10^3/uL


(140-400) 170 x10^3/uL


(140-400) 





 


Neutrophils (%) (Auto) 64 % (31-73)  56 % (31-73)  


 


Lymphocytes (%) (Auto) 14 % (24-48)  21 % (24-48)  


 


Monocytes (%) (Auto) 14 % (0-9)  15 % (0-9)  


 


Eosinophils (%) (Auto) 8 % (0-3)  8 % (0-3)  


 


Basophils (%) (Auto) 1 % (0-3)  0 % (0-3)  


 


Neutrophils # (Auto)


 4.0 x10^3uL


(1.8-7.7) 3.4 x10^3uL


(1.8-7.7) 





 


Lymphocytes # (Auto)


 0.8 x10^3/uL


(1.0-4.8) 1.3 x10^3/uL


(1.0-4.8) 





 


Monocytes # (Auto)


 0.9 x10^3/uL


(0.0-1.1) 0.9 x10^3/uL


(0.0-1.1) 





 


Eosinophils # (Auto)


 0.5 x10^3/uL


(0.0-0.7) 0.5 x10^3/uL


(0.0-0.7) 





 


Basophils # (Auto)


 0.0 x10^3/uL


(0.0-0.2) 0.0 x10^3/uL


(0.0-0.2) 





 


Sodium Level


 141 mmol/L


(136-145) 


 140 mmol/L


(136-145)


 


Potassium Level


 4.3 mmol/L


(3.5-5.1) 


 4.3 mmol/L


(3.5-5.1)


 


Chloride Level


 105 mmol/L


() 


 106 mmol/L


()


 


Carbon Dioxide Level


 24 mmol/L


(21-32) 


 23 mmol/L


(21-32)


 


Anion Gap 12 (6-14)   11 (6-14) 


 


Blood Urea Nitrogen


 20 mg/dL


(8-26) 


 17 mg/dL


(8-26)


 


Creatinine


 1.0 mg/dL


(0.7-1.3) 


 1.1 mg/dL


(0.7-1.3)


 


Estimated GFR


(Cockcroft-Gault) 72.3 


 


 64.7 





 


Glucose Level


 101 mg/dL


(70-99) 


 104 mg/dL


(70-99)


 


Calcium Level


 8.9 mg/dL


(8.5-10.1) 


 9.0 mg/dL


(8.5-10.1)








Laboratory Tests








Test


 12/28/18


06:01 12/28/18


06:07


 


White Blood Count


 6.1 x10^3/uL


(4.0-11.0) 





 


Red Blood Count


 6.21 x10^6/uL


(4.30-5.70) 





 


Hemoglobin


 20.2 g/dL


(13.0-17.5) 





 


Hematocrit


 58.7 %


(39.0-53.0) 





 


Mean Corpuscular Volume 95 fL ()  


 


Mean Corpuscular Hemoglobin 33 pg (25-35)  


 


Mean Corpuscular Hemoglobin


Concent 35 g/dL


(31-37) 





 


Red Cell Distribution Width


 16.3 %


(11.5-14.5) 





 


Platelet Count


 170 x10^3/uL


(140-400) 





 


Neutrophils (%) (Auto) 56 % (31-73)  


 


Lymphocytes (%) (Auto) 21 % (24-48)  


 


Monocytes (%) (Auto) 15 % (0-9)  


 


Eosinophils (%) (Auto) 8 % (0-3)  


 


Basophils (%) (Auto) 0 % (0-3)  


 


Neutrophils # (Auto)


 3.4 x10^3uL


(1.8-7.7) 





 


Lymphocytes # (Auto)


 1.3 x10^3/uL


(1.0-4.8) 





 


Monocytes # (Auto)


 0.9 x10^3/uL


(0.0-1.1) 





 


Eosinophils # (Auto)


 0.5 x10^3/uL


(0.0-0.7) 





 


Basophils # (Auto)


 0.0 x10^3/uL


(0.0-0.2) 





 


Sodium Level


 


 140 mmol/L


(136-145)


 


Potassium Level


 


 4.3 mmol/L


(3.5-5.1)


 


Chloride Level


 


 106 mmol/L


()


 


Carbon Dioxide Level


 


 23 mmol/L


(21-32)


 


Anion Gap  11 (6-14) 


 


Blood Urea Nitrogen


 


 17 mg/dL


(8-26)


 


Creatinine


 


 1.1 mg/dL


(0.7-1.3)


 


Estimated GFR


(Cockcroft-Gault) 


 64.7 





 


Glucose Level


 


 104 mg/dL


(70-99)


 


Calcium Level


 


 9.0 mg/dL


(8.5-10.1)








Microbiology


12/19/18 Blood Culture - Final, Complete


           NO GROWTH AFTER 5 DAYS


12/22/18 Anaerobic/Aerobic Culture - Final, Complete


           


12/22/18 Anaerobic Culture Result 1 ([facility]) - Final, Complete


           


12/22/18 Aerobic Culture - Final, Complete


           


12/22/18 Aerobic Culture Result 1 ([facility]) - Final, Complete


           


12/22/18 Aerobic Culture Result 2 ([facility]) - Final, Complete


           


12/22/18 Aerobic Culture Result 3 ([facility]) - Final, Complete


           


12/22/18 Antimicrobic Susceptibility - Final, Complete


           


12/22/18 Gram Stain - Final, Complete


           


12/22/18 Gram Stain Result 1 ([facility]) - Final, Complete


           


12/22/18 Gram Stain Result 2 ([facility]) - Final, Complete


Medications





Current Medications


Sodium Chloride 1,000 ml @  1,000 mls/hr 1X  ONCE IV  Last administered on 12/19 /18at 19:19;  Start 12/19/18 at 19:00;  Stop 12/19/18 at 19:59;  Status DC


Vancomycin HCl (Vanco Per Pharmacy) 1 each PRN DAILY  PRN MC SEE COMMENTS Last 

administered on 12/26/18at 12:35;  Start 12/19/18 at 19:45;  Stop 12/27/18 at 09

:15;  Status DC


Vancomycin HCl 2 gm/Sodium Chloride 500 ml @  250 mls/hr ONCE  ONCE IV  Last 

administered on 12/19/18at 20:00;  Start 12/19/18 at 19:45;  Stop 12/19/18 at 21

:44;  Status DC


Ondansetron HCl (Zofran) 4 mg PRN Q8HRS  PRN IV NAUSEA/VOMITING 1ST CHOICE;  

Start 12/19/18 at 21:15;  Stop 12/20/18 at 21:14;  Status DC


Sodium Chloride 1,000 ml @  125 mls/hr Q8H IV  Last administered on 12/20/18at 

20:01;  Start 12/19/18 at 21:03;  Stop 12/20/18 at 21:02;  Status DC


Fentanyl Citrate (Fentanyl 2ml Vial) 25 mcg PRN Q2HR  PRN IV SEVERE PAIN Last 

administered on 12/20/18at 13:59;  Start 12/20/18 at 01:45;  Stop 12/20/18 at 14

:39;  Status DC


Vancomycin HCl 1.25 gm/Sodium Chloride 250 ml @  167 mls/hr Q12H IV  Last 

administered on 12/26/18at 20:10;  Start 12/20/18 at 08:00;  Stop 12/27/18 at 09

:15;  Status DC


Vancomycin HCl (Vancomycin Trough Level) 1 each 1X  ONCE MC ;  Start 12/22/18 

at 07:30;  Stop 12/22/18 at 07:30;  Status DC


Lactobacillus Rhamnosus (Culturelle) 1 cap BID PO  Last administered on 12/28/ 18at 08:33;  Start 12/20/18 at 09:00


Fentanyl Citrate (Fentanyl 2ml Vial) 50 mcg PRN Q2HR  PRN IV SEVERE PAIN Last 

administered on 12/21/18at 09:05;  Start 12/20/18 at 14:45


Apixaban (Eliquis) 5 mg BID PO  Last administered on 12/23/18at 09:39;  Start 12 /20/18 at 15:00;  Stop 12/24/18 at 11:45;  Status DC


Acetaminophen/ Hydrocodone Bitart (Lortab 7.5/325) 1 tab PRN Q4HRS  PRN PO PAIN 

Last administered on 12/24/18at 05:27;  Start 12/21/18 at 11:15


Info (Anti-Coagulation Monitoring By Pharmacy) 1 each PRN DAILY  PRN MC SEE 

COMMENTS Last administered on 12/23/18at 13:08;  Start 12/21/18 at 11:30;  Stop 

12/24/18 at 11:45;  Status DC


Magnesium Hydroxide (Milk Of Magnesia) 2,400 mg PRN DAILY  PRN PO CONSTIPATION 

Last administered on 12/25/18at 21:09;  Start 12/21/18 at 14:15


Iohexol (Omnipaque 300 Mg/ml) 100 ml 1X  ONCE IV  Last administered on 12/22/ 18at 11:45;  Start 12/22/18 at 11:45;  Stop 12/22/18 at 11:46;  Status DC


Info (CONTRAST GIVEN -- Rx MONITORING) 1 each PRN DAILY  PRN MC SEE COMMENTS;  

Start 12/22/18 at 11:45;  Stop 12/24/18 at 11:33;  Status DC


Piperacillin Sod/ Tazobactam Sod 3.375 gm/Sodium Chloride 50 ml @  100 mls/hr 

Q6HRS IV  Last administered on 12/28/18at 06:08;  Start 12/22/18 at 18:30;  

Stop 12/28/18 at 10:10;  Status DC


Potassium Chloride (Klor-Con) 40 meq 1X  ONCE PO  Last administered on 12/23/ 18at 17:44;  Start 12/23/18 at 14:30;  Stop 12/23/18 at 14:31;  Status DC


Potassium Chloride (Klor-Con) 20 meq DAILYWBKFT PO  Last administered on 12/28/ 18at 08:33;  Start 12/24/18 at 08:00


Iohexol (Omnipaque 300 Mg/ml) 95 ml 1X  ONCE IV  Last administered on 12/23/ 18at 15:09;  Start 12/23/18 at 14:45;  Stop 12/23/18 at 14:47;  Status DC


Info (CONTRAST GIVEN -- Rx MONITORING) 1 each PRN DAILY  PRN MC SEE COMMENTS;  

Start 12/23/18 at 15:00;  Stop 12/25/18 at 14:59;  Status DC


Regadenoson (Lexiscan) 0.4 mg 1X  ONCE IV  Last administered on 12/24/18at 08:30

;  Start 12/24/18 at 08:30;  Stop 12/24/18 at 08:31;  Status DC


Midazolam HCl (Versed) 2 mg STK-MED ONCE .ROUTE ;  Start 12/24/18 at 09:55;  

Stop 12/24/18 at 09:56;  Status DC


Fentanyl Citrate (Fentanyl 2ml Vial) 100 mcg STK-MED ONCE .ROUTE ;  Start 12/24/ 18 at 09:55;  Stop 12/24/18 at 09:56;  Status DC


Lidocaine/Sodium Bicarbonate (Buffered Lidocaine 1%) 3 ml STK-MED ONCE .ROUTE ;

  Start 12/24/18 at 09:57;  Stop 12/24/18 at 09:58;  Status DC


Lidocaine/Sodium Bicarbonate (Buffered Lidocaine 1%) 3 ml 1X  ONCE IJ  Last 

administered on 12/24/18at 10:25;  Start 12/24/18 at 10:00;  Stop 12/24/18 at 10

:12;  Status DC


Midazolam HCl (Versed) 2 mg 1X  ONCE IV  Last administered on 12/24/18at 10:26;

  Start 12/24/18 at 10:00;  Stop 12/24/18 at 10:12;  Status DC


Fentanyl Citrate (Fentanyl 2ml Vial) 100 mcg 1X  ONCE IV  Last administered on 

12/24/18at 10:27;  Start 12/24/18 at 10:00;  Stop 12/24/18 at 10:12;  Status DC


Atorvastatin Calcium (Lipitor) 40 mg QHS PO  Last administered on 12/27/18at 21:

22;  Start 12/24/18 at 21:00


Aspirin (Ecotrin) 81 mg DAILYWBKFT PO  Last administered on 12/28/18at 08:32;  

Start 12/25/18 at 08:00


Aspirin (Ecotrin) 325 mg 1X  ONCE PO  Last administered on 12/24/18at 14:15;  

Start 12/24/18 at 14:15;  Stop 12/24/18 at 14:16;  Status DC


Multi-Ingred Cream/Lotion/Oil/ Oint (Hydrocerin Cream) 1 aurelia PRN Q1HR  PRN TP 

DRY SKIN / SCALING;  Start 12/25/18 at 09:00


Vancomycin HCl (Vancomycin Trough Level) 1 each 1X  ONCE MC ;  Start 12/26/18 

at 07:30;  Stop 12/26/18 at 07:31;  Status DC


Polyethylene Glycol (miraLAX PACKET) 17 gm 1X  ONCE PO ;  Start 12/30/18 at 09:

00;  Stop 12/30/18 at 09:00;  Status DC


Sodium Chloride 1,000 ml @  75 mls/hr [id number] IV  Last administered on 12/28/ 18at 06:07;  Start 12/27/18 at 16:00


Polyethylene Glycol (miraLAX PACKET) 17 gm 1X  ONCE PO ;  Start 1/1/19 at 09:00

;  Stop 1/1/19 at 09:01


Levofloxacin (Levaquin) 500 mg DAILY06 PO ;  Start 12/28/18 at 11:00





Active Scripts


Active


Aspirin Ec (Aspirin) 81 Mg Tablet. 81 Mg PO DAILYWBKFT


Atorvastatin Calcium 40 Mg Tablet 40 Mg PO QHS


Augmentin 875-125 Tablet (Amoxicillin/Potassium Clav) 1 Each Tablet 1 Tab PO BID


Percocet 5-325 Mg Tablet ** (Oxycodone/Acetaminophen) 1 Each Tablet 1 Tab PO 

PRN Q6HRS PRN


Reported


Eliquis (Apixaban) 5 Mg Tablet 5 Mg PO BID


Vitals/I & O





Vital Sign - Last 24 Hours








 12/27/18 12/27/18 12/27/18 12/27/18





 15:00 19:00 20:00 23:00


 


Temp 98.1 98.3  97.7





 98.1 98.3  97.7


 


Pulse 78 70  79


 


Resp 18 18  18


 


B/P (MAP) 122/62 (82) 111/69 (83)  133/75 (94)


 


Pulse Ox 94 94  93


 


O2 Delivery Room Air Room Air Room Air Room Air


 


O2 Flow Rate   2.0 


 


    





    





 12/28/18 12/28/18  





 03:00 07:00  


 


Temp 98.1 97.9  





 98.1 97.9  


 


Pulse 73 80  


 


Resp 18   


 


B/P (MAP) 114/77 (89) 116/59 (78)  


 


Pulse Ox 95 94  


 


O2 Delivery Room Air   


 


O2 Flow Rate 2.0   














Intake and Output   


 


 12/27/18 12/27/18 12/28/18





 15:01 23:01 07:01


 


Intake Total 360 ml 180 ml 


 


Output Total 750 ml 200 ml 202 ml


 


Balance -390 ml -20 ml -202 ml

















KATIE BECKHAM MD Dec 28, 2018 10:31

## 2018-12-30 NOTE — PDOC
Date and Time


Preop chart review for Vascular surgical procedure Wed Jan 2.


Of note admit Hb over 18, it is now over 20. Patient has relatively low oxygen 

saturations (90 RA,  92-95 with supplemental oxygen), d/w Dr Atkinson who does 

not think the polycythemia is from hypoxia. Patient had a bone marrow last week 

but results are pending at this time.





Risks of arterial and venous thrombosis, bleeding and tissue ischemia are 

dramatically increased during anesthesia when Hct is this high. Hct should be 

below 50 (ideally 40 to 45) prior to having the proposed operation. Best case 

scenario would be to remove and store 3 or 4 units of blood with concurrent 

volume replacement (Lactated ringers 3ml per ml of blood removed or albumin 5% 

1ml per ml blood removed) prior to operation. Doing this would provide a blood 

reserve should it be needed during or after the operation.





Unfortunately the blood bank here cannot do autologous blood collection. [facility] apparently can but will likely not be able to remove a 

sufficient volume in time for an operation this Wednesday and the patient has 

refused to go there in the past.





Another option would be for us to remove blood and hemodilute in the operating 

room and store the blood in cell saver during the actual operation. The problem 

with this is that the blood will not be able to be stored for potential 

postoperative use.





Will talk to surgeon re urgency of operation relative to correcting 

polycythemia preop.


Current Medications





Current Medications


Sodium Chloride 1,000 ml @  1,000 mls/hr 1X  ONCE IV  Last administered on 12/19 /18at 19:19;  Start 12/19/18 at 19:00;  Stop 12/19/18 at 19:59;  Status DC


Vancomycin HCl (Vanco Per Pharmacy) 1 each PRN DAILY  PRN MC SEE COMMENTS Last 

administered on 12/26/18at 12:35;  Start 12/19/18 at 19:45;  Stop 12/27/18 at 09

:15;  Status DC


Vancomycin HCl 2 gm/Sodium Chloride 500 ml @  250 mls/hr ONCE  ONCE IV  Last 

administered on 12/19/18at 20:00;  Start 12/19/18 at 19:45;  Stop 12/19/18 at 21

:44;  Status DC


Ondansetron HCl (Zofran) 4 mg PRN Q8HRS  PRN IV NAUSEA/VOMITING 1ST CHOICE;  

Start 12/19/18 at 21:15;  Stop 12/20/18 at 21:14;  Status DC


Sodium Chloride 1,000 ml @  125 mls/hr Q8H IV  Last administered on 12/20/18at 

20:01;  Start 12/19/18 at 21:03;  Stop 12/20/18 at 21:02;  Status DC


Fentanyl Citrate (Fentanyl 2ml Vial) 25 mcg PRN Q2HR  PRN IV SEVERE PAIN Last 

administered on 12/20/18at 13:59;  Start 12/20/18 at 01:45;  Stop 12/20/18 at 14

:39;  Status DC


Vancomycin HCl 1.25 gm/Sodium Chloride 250 ml @  167 mls/hr Q12H IV  Last 

administered on 12/26/18at 20:10;  Start 12/20/18 at 08:00;  Stop 12/27/18 at 09

:15;  Status DC


Vancomycin HCl (Vancomycin Trough Level) 1 each 1X  ONCE MC ;  Start 12/22/18 

at 07:30;  Stop 12/22/18 at 07:30;  Status DC


Lactobacillus Rhamnosus (Culturelle) 1 cap BID PO  Last administered on 12/28/ 18at 08:33;  Start 12/20/18 at 09:00;  Stop 12/28/18 at 17:16;  Status DC


Fentanyl Citrate (Fentanyl 2ml Vial) 50 mcg PRN Q2HR  PRN IV SEVERE PAIN Last 

administered on 12/21/18at 09:05;  Start 12/20/18 at 14:45;  Stop 12/28/18 at 17

:16;  Status DC


Apixaban (Eliquis) 5 mg BID PO  Last administered on 12/23/18at 09:39;  Start 12 /20/18 at 15:00;  Stop 12/24/18 at 11:45;  Status DC


Acetaminophen/ Hydrocodone Bitart (Lortab 7.5/325) 1 tab PRN Q4HRS  PRN PO PAIN 

Last administered on 12/24/18at 05:27;  Start 12/21/18 at 11:15;  Stop 12/28/18 

at 17:16;  Status DC


Info (Anti-Coagulation Monitoring By Pharmacy) 1 each PRN DAILY  PRN MC SEE 

COMMENTS Last administered on 12/23/18at 13:08;  Start 12/21/18 at 11:30;  Stop 

12/24/18 at 11:45;  Status DC


Magnesium Hydroxide (Milk Of Magnesia) 2,400 mg PRN DAILY  PRN PO CONSTIPATION 

Last administered on 12/25/18at 21:09;  Start 12/21/18 at 14:15;  Stop 12/28/18 

at 17:16;  Status DC


Iohexol (Omnipaque 300 Mg/ml) 100 ml 1X  ONCE IV  Last administered on 12/22/ 18at 11:45;  Start 12/22/18 at 11:45;  Stop 12/22/18 at 11:46;  Status DC


Info (CONTRAST GIVEN -- Rx MONITORING) 1 each PRN DAILY  PRN MC SEE COMMENTS;  

Start 12/22/18 at 11:45;  Stop 12/24/18 at 11:33;  Status DC


Piperacillin Sod/ Tazobactam Sod 3.375 gm/Sodium Chloride 50 ml @  100 mls/hr 

Q6HRS IV  Last administered on 12/28/18at 06:08;  Start 12/22/18 at 18:30;  

Stop 12/28/18 at 10:10;  Status DC


Potassium Chloride (Klor-Con) 40 meq 1X  ONCE PO  Last administered on 12/23/ 18at 17:44;  Start 12/23/18 at 14:30;  Stop 12/23/18 at 14:31;  Status DC


Potassium Chloride (Klor-Con) 20 meq DAILYWBKFT PO  Last administered on 12/28/ 18at 08:33;  Start 12/24/18 at 08:00;  Stop 12/28/18 at 17:16;  Status DC


Iohexol (Omnipaque 300 Mg/ml) 95 ml 1X  ONCE IV  Last administered on 12/23/ 18at 15:09;  Start 12/23/18 at 14:45;  Stop 12/23/18 at 14:47;  Status DC


Info (CONTRAST GIVEN -- Rx MONITORING) 1 each PRN DAILY  PRN MC SEE COMMENTS;  

Start 12/23/18 at 15:00;  Stop 12/25/18 at 14:59;  Status DC


Regadenoson (Lexiscan) 0.4 mg 1X  ONCE IV  Last administered on 12/24/18at 08:30

;  Start 12/24/18 at 08:30;  Stop 12/24/18 at 08:31;  Status DC


Midazolam HCl (Versed) 2 mg STK-MED ONCE .ROUTE ;  Start 12/24/18 at 09:55;  

Stop 12/24/18 at 09:56;  Status DC


Fentanyl Citrate (Fentanyl 2ml Vial) 100 mcg STK-MED ONCE .ROUTE ;  Start 12/24/ 18 at 09:55;  Stop 12/24/18 at 09:56;  Status DC


Lidocaine/Sodium Bicarbonate (Buffered Lidocaine 1%) 3 ml STK-MED ONCE .ROUTE ;

  Start 12/24/18 at 09:57;  Stop 12/24/18 at 09:58;  Status DC


Lidocaine/Sodium Bicarbonate (Buffered Lidocaine 1%) 3 ml 1X  ONCE IJ  Last 

administered on 12/24/18at 10:25;  Start 12/24/18 at 10:00;  Stop 12/24/18 at 10

:12;  Status DC


Midazolam HCl (Versed) 2 mg 1X  ONCE IV  Last administered on 12/24/18at 10:26;

  Start 12/24/18 at 10:00;  Stop 12/24/18 at 10:12;  Status DC


Fentanyl Citrate (Fentanyl 2ml Vial) 100 mcg 1X  ONCE IV  Last administered on 

12/24/18at 10:27;  Start 12/24/18 at 10:00;  Stop 12/24/18 at 10:12;  Status DC


Atorvastatin Calcium (Lipitor) 40 mg QHS PO  Last administered on 12/27/18at 21:

22;  Start 12/24/18 at 21:00;  Stop 12/28/18 at 17:16;  Status DC


Aspirin (Ecotrin) 81 mg DAILYWBKFT PO  Last administered on 12/28/18at 08:32;  

Start 12/25/18 at 08:00;  Stop 12/28/18 at 17:16;  Status DC


Aspirin (Ecotrin) 325 mg 1X  ONCE PO  Last administered on 12/24/18at 14:15;  

Start 12/24/18 at 14:15;  Stop 12/24/18 at 14:16;  Status DC


Multi-Ingred Cream/Lotion/Oil/ Oint (Hydrocerin Cream) 1 aurelia PRN Q1HR  PRN TP 

DRY SKIN / SCALING;  Start 12/25/18 at 09:00;  Stop 12/28/18 at 17:16;  Status 

DC


Vancomycin HCl (Vancomycin Trough Level) 1 each 1X  ONCE MC ;  Start 12/26/18 

at 07:30;  Stop 12/26/18 at 07:31;  Status DC


Polyethylene Glycol (miraLAX PACKET) 17 gm 1X  ONCE PO ;  Start 12/30/18 at 09:

00;  Stop 12/30/18 at 09:00;  Status DC


Sodium Chloride 1,000 ml @  75 mls/hr [id number] IV  Last administered on 12/28/ 18at 06:07;  Start 12/27/18 at 16:00;  Stop 12/28/18 at 17:16;  Status DC


Polyethylene Glycol (miraLAX PACKET) 17 gm 1X  ONCE PO ;  Start 1/1/19 at 09:00

;  Stop 1/1/19 at 09:00;  Status DC


Levofloxacin (Levaquin) 500 mg DAILY06 PO  Last administered on 12/28/18at 12:27

;  Start 12/28/18 at 11:00;  Stop 12/28/18 at 17:16;  Status DC





Active Scripts


Active


Aspirin Ec (Aspirin) 81 Mg Tablet.dr 81 Mg PO DAILYWBKFT


Atorvastatin Calcium 40 Mg Tablet 40 Mg PO QHS


Augmentin 875-125 Tablet (Amoxicillin/Potassium Clav) 1 Each Tablet 1 Tab PO BID


Percocet 5-325 Mg Tablet ** (Oxycodone/Acetaminophen) 1 Each Tablet 1 Tab PO 

PRN Q6HRS PRN


Reported


Miralax (Polyethylene Glycol 3350) 17 Gm Powd.pack 1 Packet PO 1X











NICA SAUCEDO MD Dec 30, 2018 11:32

## 2019-01-23 VITALS
DIASTOLIC BLOOD PRESSURE: 66 MMHG | SYSTOLIC BLOOD PRESSURE: 109 MMHG | DIASTOLIC BLOOD PRESSURE: 66 MMHG | SYSTOLIC BLOOD PRESSURE: 109 MMHG

## 2019-02-25 ENCOUNTER — HOSPITAL ENCOUNTER (OUTPATIENT)
Dept: HOSPITAL 61 - PMGWOUND | Age: 79
Discharge: HOME | End: 2019-02-25
Attending: EMERGENCY MEDICINE
Payer: MEDICARE

## 2019-02-25 DIAGNOSIS — L97.512: ICD-10-CM

## 2019-02-25 DIAGNOSIS — N18.2: ICD-10-CM

## 2019-02-25 DIAGNOSIS — I70.245: ICD-10-CM

## 2019-02-25 DIAGNOSIS — E78.5: ICD-10-CM

## 2019-02-25 DIAGNOSIS — Z86.711: ICD-10-CM

## 2019-02-25 DIAGNOSIS — I12.9: ICD-10-CM

## 2019-02-25 DIAGNOSIS — Z86.718: ICD-10-CM

## 2019-02-25 DIAGNOSIS — L97.522: ICD-10-CM

## 2019-02-25 DIAGNOSIS — M19.90: ICD-10-CM

## 2019-02-25 DIAGNOSIS — F17.210: ICD-10-CM

## 2019-02-25 DIAGNOSIS — D47.1: ICD-10-CM

## 2019-02-25 DIAGNOSIS — I70.235: Primary | ICD-10-CM

## 2019-02-25 DIAGNOSIS — J44.9: ICD-10-CM

## 2019-02-25 DIAGNOSIS — I87.2: ICD-10-CM

## 2019-02-25 PROCEDURE — 97597 DBRDMT OPN WND 1ST 20 CM/<: CPT

## 2019-03-05 ENCOUNTER — HOSPITAL ENCOUNTER (OUTPATIENT)
Dept: HOSPITAL 61 - PMGWOUND | Age: 79
Discharge: HOME | End: 2019-03-05
Attending: EMERGENCY MEDICINE
Payer: MEDICARE

## 2019-03-05 DIAGNOSIS — Z86.718: ICD-10-CM

## 2019-03-05 DIAGNOSIS — J44.9: ICD-10-CM

## 2019-03-05 DIAGNOSIS — M19.90: ICD-10-CM

## 2019-03-05 DIAGNOSIS — Z86.711: ICD-10-CM

## 2019-03-05 DIAGNOSIS — I70.235: Primary | ICD-10-CM

## 2019-03-05 DIAGNOSIS — N18.2: ICD-10-CM

## 2019-03-05 DIAGNOSIS — F17.210: ICD-10-CM

## 2019-03-05 DIAGNOSIS — I87.2: ICD-10-CM

## 2019-03-05 DIAGNOSIS — L97.512: ICD-10-CM

## 2019-03-05 DIAGNOSIS — E78.5: ICD-10-CM

## 2019-03-05 DIAGNOSIS — I25.10: ICD-10-CM

## 2019-03-05 DIAGNOSIS — I12.9: ICD-10-CM

## 2019-03-05 DIAGNOSIS — I70.245: ICD-10-CM

## 2019-03-05 PROCEDURE — 99214 OFFICE O/P EST MOD 30 MIN: CPT

## 2019-03-05 PROCEDURE — G0463 HOSPITAL OUTPT CLINIC VISIT: HCPCS

## 2019-03-11 ENCOUNTER — HOSPITAL ENCOUNTER (INPATIENT)
Dept: HOSPITAL 61 - ER | Age: 79
LOS: 3 days | Discharge: ANOTHER HOSPITAL | DRG: 872 | End: 2019-03-14
Attending: INTERNAL MEDICINE | Admitting: INTERNAL MEDICINE
Payer: MEDICARE

## 2019-03-11 VITALS — BODY MASS INDEX: 23.16 KG/M2 | WEIGHT: 160 LBS | HEIGHT: 69.5 IN

## 2019-03-11 VITALS — SYSTOLIC BLOOD PRESSURE: 120 MMHG | DIASTOLIC BLOOD PRESSURE: 62 MMHG

## 2019-03-11 VITALS — SYSTOLIC BLOOD PRESSURE: 125 MMHG | DIASTOLIC BLOOD PRESSURE: 60 MMHG

## 2019-03-11 DIAGNOSIS — E44.0: ICD-10-CM

## 2019-03-11 DIAGNOSIS — B35.1: ICD-10-CM

## 2019-03-11 DIAGNOSIS — S91.302A: ICD-10-CM

## 2019-03-11 DIAGNOSIS — I95.9: ICD-10-CM

## 2019-03-11 DIAGNOSIS — F17.200: ICD-10-CM

## 2019-03-11 DIAGNOSIS — Z79.82: ICD-10-CM

## 2019-03-11 DIAGNOSIS — F22: ICD-10-CM

## 2019-03-11 DIAGNOSIS — Z91.19: ICD-10-CM

## 2019-03-11 DIAGNOSIS — S91.301A: ICD-10-CM

## 2019-03-11 DIAGNOSIS — I99.8: ICD-10-CM

## 2019-03-11 DIAGNOSIS — Z79.899: ICD-10-CM

## 2019-03-11 DIAGNOSIS — L60.0: ICD-10-CM

## 2019-03-11 DIAGNOSIS — Z86.711: ICD-10-CM

## 2019-03-11 DIAGNOSIS — L03.115: ICD-10-CM

## 2019-03-11 DIAGNOSIS — Z86.718: ICD-10-CM

## 2019-03-11 DIAGNOSIS — J44.9: ICD-10-CM

## 2019-03-11 DIAGNOSIS — I25.10: ICD-10-CM

## 2019-03-11 DIAGNOSIS — E78.5: ICD-10-CM

## 2019-03-11 DIAGNOSIS — L97.509: ICD-10-CM

## 2019-03-11 DIAGNOSIS — I25.2: ICD-10-CM

## 2019-03-11 DIAGNOSIS — I77.9: ICD-10-CM

## 2019-03-11 DIAGNOSIS — D45: ICD-10-CM

## 2019-03-11 DIAGNOSIS — L60.2: ICD-10-CM

## 2019-03-11 DIAGNOSIS — I73.9: ICD-10-CM

## 2019-03-11 DIAGNOSIS — L03.116: ICD-10-CM

## 2019-03-11 DIAGNOSIS — A41.9: Primary | ICD-10-CM

## 2019-03-11 DIAGNOSIS — N40.0: ICD-10-CM

## 2019-03-11 LAB
ALBUMIN SERPL-MCNC: 3.5 G/DL (ref 3.4–5)
ALBUMIN/GLOB SERPL: 1 {RATIO} (ref 1–1.7)
ALP SERPL-CCNC: 111 U/L (ref 46–116)
ALT SERPL-CCNC: 13 U/L (ref 16–63)
ANION GAP SERPL CALC-SCNC: 12 MMOL/L (ref 6–14)
AST SERPL-CCNC: 12 U/L (ref 15–37)
BASOPHILS # BLD AUTO: 0 X10^3/UL (ref 0–0.2)
BASOPHILS NFR BLD: 1 % (ref 0–3)
BILIRUB SERPL-MCNC: 0.7 MG/DL (ref 0.2–1)
BUN SERPL-MCNC: 11 MG/DL (ref 8–26)
BUN/CREAT SERPL: 12 (ref 6–20)
CALCIUM SERPL-MCNC: 9.1 MG/DL (ref 8.5–10.1)
CHLORIDE SERPL-SCNC: 104 MMOL/L (ref 98–107)
CO2 SERPL-SCNC: 26 MMOL/L (ref 21–32)
CREAT SERPL-MCNC: 0.9 MG/DL (ref 0.7–1.3)
EOSINOPHIL NFR BLD: 0.2 X10^3/UL (ref 0–0.7)
EOSINOPHIL NFR BLD: 2 % (ref 0–3)
ERYTHROCYTE [DISTWIDTH] IN BLOOD BY AUTOMATED COUNT: 16.7 % (ref 11.5–14.5)
GFR SERPLBLD BASED ON 1.73 SQ M-ARVRAT: 81.6 ML/MIN
GLOBULIN SER-MCNC: 3.6 G/DL (ref 2.2–3.8)
GLUCOSE SERPL-MCNC: 92 MG/DL (ref 70–99)
HCT VFR BLD CALC: 54.1 % (ref 39–53)
HGB BLD-MCNC: 17.9 G/DL (ref 13–17.5)
LYMPHOCYTES # BLD: 1.1 X10^3/UL (ref 1–4.8)
LYMPHOCYTES NFR BLD AUTO: 13 % (ref 24–48)
MCH RBC QN AUTO: 31 PG (ref 25–35)
MCHC RBC AUTO-ENTMCNC: 33 G/DL (ref 31–37)
MCV RBC AUTO: 93 FL (ref 79–100)
MONO #: 0.8 X10^3/UL (ref 0–1.1)
MONOCYTES NFR BLD: 9 % (ref 0–9)
NEUT #: 6.5 X10^3UL (ref 1.8–7.7)
NEUTROPHILS NFR BLD AUTO: 76 % (ref 31–73)
PLATELET # BLD AUTO: 189 X10^3/UL (ref 140–400)
POTASSIUM SERPL-SCNC: 4.3 MMOL/L (ref 3.5–5.1)
PROT SERPL-MCNC: 7.1 G/DL (ref 6.4–8.2)
RBC # BLD AUTO: 5.8 X10^6/UL (ref 4.3–5.7)
SODIUM SERPL-SCNC: 142 MMOL/L (ref 136–145)
WBC # BLD AUTO: 8.6 X10^3/UL (ref 4–11)

## 2019-03-11 PROCEDURE — 71046 X-RAY EXAM CHEST 2 VIEWS: CPT

## 2019-03-11 PROCEDURE — 96376 TX/PRO/DX INJ SAME DRUG ADON: CPT

## 2019-03-11 PROCEDURE — 87040 BLOOD CULTURE FOR BACTERIA: CPT

## 2019-03-11 PROCEDURE — 85025 COMPLETE CBC W/AUTO DIFF WBC: CPT

## 2019-03-11 PROCEDURE — 73630 X-RAY EXAM OF FOOT: CPT

## 2019-03-11 PROCEDURE — 83605 ASSAY OF LACTIC ACID: CPT

## 2019-03-11 PROCEDURE — 96375 TX/PRO/DX INJ NEW DRUG ADDON: CPT

## 2019-03-11 PROCEDURE — 36415 COLL VENOUS BLD VENIPUNCTURE: CPT

## 2019-03-11 PROCEDURE — 93970 EXTREMITY STUDY: CPT

## 2019-03-11 PROCEDURE — 96365 THER/PROPH/DIAG IV INF INIT: CPT

## 2019-03-11 PROCEDURE — 80053 COMPREHEN METABOLIC PANEL: CPT

## 2019-03-11 RX ADMIN — FENTANYL CITRATE PRN MCG: 50 INJECTION INTRAMUSCULAR; INTRAVENOUS at 23:35

## 2019-03-11 RX ADMIN — FENTANYL CITRATE PRN MCG: 50 INJECTION INTRAMUSCULAR; INTRAVENOUS at 21:50

## 2019-03-11 NOTE — PHYS DOC
Past Medical History


Past Medical History:  Other


Additional Past Medical Histor:  CELLULITIS, back pain,


Past Surgical History:  Other


Additional Past Surgical Histo:  R LEG SURG


Alcohol Use:  Occasionally


Drug Use:  None





Adult General


Chief Complaint


Chief Complaint:  WOUND CHECK





[facility]


HPI





Patient is a 78  year old male who presents with wounds and redness and weeping 

to bilateral feet that extends up patient's shins to about knees. Patient 

states that he has been going to wound care has been seen wound care and 

scheduled to follow-up with wound care tomorrow for this but wanted to come in 

today. Patient states he has increased pain in the right foot also is rating at 

a 7 out of 10. Patient states he took Percocet at home earlier but the pain is 

coming back. He states it is sharp in quality and burning.





Review of Systems


Review of Systems





Constitutional: Denies fever or chills []


Eyes: Denies change in visual acuity, redness, or eye pain []


HENT: Denies nasal congestion or sore throat []


Respiratory: Denies cough or shortness of breath []


Cardiovascular: No additional information not addressed in HPI []


GI: Denies abdominal pain, nausea, vomiting, bloody stools or diarrhea []


: Denies dysuria or hematuria []


Musculoskeletal: Denies back pain or joint pain []


Integument: Bilateral foot weeping with right foot and burning. Denies rash or 

skin lesions []


Neurologic: Denies headache, focal weakness or sensory changes []








All other systems were reviewed and found to be within normal limits, except as 

documented in this note.





Current Medications


Current Medications





Current Medications








 Medications


  (Trade)  Dose


 Ordered  Sig/[facility]  Start Time


 Stop Time Status Last Admin


Dose Admin


 


 Fentanyl Citrate


  (Fentanyl 2ml


 Vial)  50 mcg  1X  ONCE  3/11/19 18:15


 3/11/19 18:16 DC 3/11/19 18:52


50 MCG











Allergies


Allergies





Allergies








Coded Allergies Type Severity Reaction Last Updated Verified


 


  No Known Drug Allergies    5/6/17 No











Physical Exam


Physical Exam





Constitutional: Well developed, well nourished, no acute distress, non-toxic 

appearance. []


HENT: Normocephalic, atraumatic, bilateral external ears normal, oropharynx 

moist, no oral exudates, nose normal. []


Eyes: PERRLA, EOMI, conjunctiva normal, no discharge. [] 


Neck: Normal range of motion, no tenderness, supple, no stridor. [] 


Cardiovascular:Heart rate regular rhythm, no murmur []


Lungs & Thorax:  Bilateral breath sounds clear to auscultation []


Abdomen: Bowel sounds normal, soft, no tenderness, no masses, no pulsatile 

masses. [] 


Skin: Warm, dry, right and left lower extremity erythema and weeping, no rash. [

] 


Back: No tenderness, no CVA tenderness. [] 


Extremities: Right foot tenderness, no cyanosis, no clubbing, ROM intact, no 

edema. [] 


Neurologic: Alert and oriented X 3, normal motor function, normal sensory 

function, no focal deficits noted. []


Psychologic: Affect normal, judgement normal, mood normal. []





Current Patient Data


Vital Signs





 Vital Signs








  Date Time  Temp Pulse Resp B/P (MAP) Pulse Ox O2 Delivery O2 Flow Rate FiO2


 


3/11/19 18:52   16  97   


 


3/11/19 17:30 97.6 103  142/65 (90)  Room Air  





 97.6       








Lab Values





 Laboratory Tests








Test


 3/11/19


18:38


 


White Blood Count


 8.6 x10^3/uL


(4.0-11.0)


 


Red Blood Count


 5.80 x10^6/uL


(4.30-5.70)  H


 


Hemoglobin


 17.9 g/dL


(13.0-17.5)  H


 


Hematocrit


 54.1 %


(39.0-53.0)  H


 


Mean Corpuscular Volume


 93 fL ()





 


Mean Corpuscular Hemoglobin 31 pg (25-35)  


 


Mean Corpuscular Hemoglobin


Concent 33 g/dL


(31-37)


 


Red Cell Distribution Width


 16.7 %


(11.5-14.5)  H


 


Platelet Count


 189 x10^3/uL


(140-400)


 


Neutrophils (%) (Auto) 76 % (31-73)  H


 


Lymphocytes (%) (Auto) 13 % (24-48)  L


 


Monocytes (%) (Auto) 9 % (0-9)  


 


Eosinophils (%) (Auto) 2 % (0-3)  


 


Basophils (%) (Auto) 1 % (0-3)  


 


Neutrophils # (Auto)


 6.5 x10^3uL


(1.8-7.7)


 


Lymphocytes # (Auto)


 1.1 x10^3/uL


(1.0-4.8)


 


Monocytes # (Auto)


 0.8 x10^3/uL


(0.0-1.1)


 


Eosinophils # (Auto)


 0.2 x10^3/uL


(0.0-0.7)


 


Basophils # (Auto)


 0.0 x10^3/uL


(0.0-0.2)


 


Sodium Level


 142 mmol/L


(136-145)


 


Potassium Level


 4.3 mmol/L


(3.5-5.1)


 


Chloride Level


 104 mmol/L


()


 


Carbon Dioxide Level


 26 mmol/L


(21-32)


 


Anion Gap 12 (6-14)  


 


Blood Urea Nitrogen


 11 mg/dL


(8-26)


 


Creatinine


 0.9 mg/dL


(0.7-1.3)


 


Estimated GFR


(Cockcroft-Gault) 81.6  





 


BUN/Creatinine Ratio 12 (6-20)  


 


Glucose Level


 92 mg/dL


(70-99)


 


Lactic Acid Level


 1.6 mmol/L


(0.4-2.0)


 


Calcium Level


 9.1 mg/dL


(8.5-10.1)


 


Total Bilirubin


 0.7 mg/dL


(0.2-1.0)


 


Aspartate Amino Transferase


(AST) 12 U/L (15-37)


L


 


Alanine Aminotransferase (ALT)


 13 U/L (16-63)


L


 


Alkaline Phosphatase


 111 U/L


()


 


Total Protein


 7.1 g/dL


(6.4-8.2)


 


Albumin


 3.5 g/dL


(3.4-5.0)


 


Albumin/Globulin Ratio 1.0 (1.0-1.7)  





 Laboratory Tests


3/11/19 18:38








 Laboratory Tests


3/11/19 18:38











EKG


EKG


[]





Radiology/Procedures


Radiology/Procedures


[]





Course & Med Decision Making


Course & Med Decision Making


Patient is a 78  year old male who presents with wounds and redness and weeping 

to bilateral feet that extends up patient's shins to about knees. Patient 

states that he has been going to wound care has been seen wound care and 

scheduled to follow-up with wound care tomorrow for this but wanted to come in 

today. Patient states he has increased pain in the right foot also is rating at 

a 7 out of 10. Patient states he took Percocet at home earlier but the pain is 

coming back. He states it is sharp in quality and burning. Patient denies nausea

, vomiting, diarrhea, fevers, abdominal pain or chest pain, shortness of air, 

numbness or tingling or focal weaknesses. Alert and Oriented. Speaks in full 

clear sentences. Mucus membranes are moist. States he currently does not have a 

primary care provider. Right and left lower extremities are red, scaly, warm 

and tender in feet. Patient will have a wound that is covered and dressed by 

wound care on both right and left dorsal foot. Bilateral pedal's have 1+ edema. 

Pedal pulses bilaterally are faint. 





1945: Bilateral foot x-rays show no obvious osteomyelitis or acute findings and 

was read by Dr. Yoon. I have spoken with Dr. Boateng permission of the 

patient an IV antibiotics.  states to put the patient on clindamycin. 

Also consulted infectious disease. Blood work is unremarkable.





Dragon Disclaimer


Dragon Disclaimer


This electronic medical record was generated, in whole or in part, using a 

voice recognition dictation system.





Departure


Departure


Impression:  


 Primary Impression:  


 Cellulitis of lower extremity


Disposition:  09 ADMITTED AS INPATIENT


Admitting Physician:  Marnie Boateng


Condition:  STABLE


Referrals:  


NO PCP (PCP)





Problem Qualifiers








 Primary Impression:  


 Cellulitis of lower extremity


 Laterality:  unspecified laterality  Qualified Codes:  L03.119 - Cellulitis of 

unspecified part of limb








JOSEPH SHAW Mar 11, 2019 18:27

## 2019-03-11 NOTE — RAD
Left leg venous Doppler study:

 

Clinical indications: Left leg and edema. Greater saphenous vein has been 

surgically removed.

 

Findings: Duplex sonography (including gray scale evaluation and color 

flow and waveform spectral analysis) of the proximal aspect of the 

profunda femoral vein and the entire length of the common femoral and 

superficial femoral and popliteal veins and the tibioperoneal trunk and 

the proximal aspect of the posterior tibial and peroneal veins of the left

leg was performed. Normal compressibility, augmentation of color Doppler 

flow after calf compression, and respiratory variation of Doppler flow is 

seen. Thus, there are no sonographic findings of deep venous thrombosis 

within these veins.

 

Impression: There are no sonographic findings of deep venous thrombosis 

within the veins discussed above of the left lower extremity.

 

 

Right leg venous Doppler study:

 

Clinical indications: Right leg edema. Greater saphenous vein has been 

surgically removed.

 

Findings: Duplex sonography (including gray scale evaluation and color 

flow and waveform spectral analysis) of the proximal aspect of the 

profunda femoral vein and the entire length of the common femoral and 

superficial femoral and popliteal veins and the tibioperoneal trunk and 

the proximal aspect of the posterior tibial and peroneal veins of the 

right leg was performed. Normal compressibility, augmentation of color 

Doppler flow after calf compression, and respiratory variation of Doppler 

flow is seen. Thus, there are no sonographic findings of deep venous 

thrombosis within these veins.

 

Impression: There are no sonographic findings of deep venous thrombosis 

within the veins discussed above of the right lower extremity.

 

Electronically signed by: Alfredito Sher MD (3/11/2019 10:49 PM) [facility]

## 2019-03-12 VITALS — DIASTOLIC BLOOD PRESSURE: 58 MMHG | SYSTOLIC BLOOD PRESSURE: 109 MMHG

## 2019-03-12 VITALS — SYSTOLIC BLOOD PRESSURE: 103 MMHG | DIASTOLIC BLOOD PRESSURE: 60 MMHG

## 2019-03-12 VITALS — DIASTOLIC BLOOD PRESSURE: 58 MMHG | SYSTOLIC BLOOD PRESSURE: 104 MMHG

## 2019-03-12 VITALS — SYSTOLIC BLOOD PRESSURE: 118 MMHG | DIASTOLIC BLOOD PRESSURE: 62 MMHG

## 2019-03-12 VITALS — DIASTOLIC BLOOD PRESSURE: 80 MMHG | SYSTOLIC BLOOD PRESSURE: 131 MMHG

## 2019-03-12 VITALS — DIASTOLIC BLOOD PRESSURE: 47 MMHG | SYSTOLIC BLOOD PRESSURE: 111 MMHG

## 2019-03-12 PROCEDURE — 0HBRXZZ EXCISION OF TOE NAIL, EXTERNAL APPROACH: ICD-10-PCS | Performed by: INTERNAL MEDICINE

## 2019-03-12 RX ADMIN — LINEZOLID SCH MG: 600 TABLET, FILM COATED ORAL at 21:56

## 2019-03-12 RX ADMIN — ASPIRIN SCH MG: 81 TABLET, COATED ORAL at 08:43

## 2019-03-12 RX ADMIN — ATORVASTATIN CALCIUM SCH MG: 40 TABLET, FILM COATED ORAL at 21:57

## 2019-03-12 RX ADMIN — Medication SCH CAP: at 21:56

## 2019-03-12 RX ADMIN — DEXTROSE SCH MLS/HR: 50 INJECTION, SOLUTION INTRAVENOUS at 10:07

## 2019-03-12 RX ADMIN — FENTANYL CITRATE PRN MCG: 50 INJECTION INTRAMUSCULAR; INTRAVENOUS at 04:34

## 2019-03-12 RX ADMIN — LINEZOLID SCH MG: 600 TABLET, FILM COATED ORAL at 10:07

## 2019-03-12 RX ADMIN — OXYCODONE HYDROCHLORIDE AND ACETAMINOPHEN PRN TAB: 5; 325 TABLET ORAL at 21:56

## 2019-03-12 RX ADMIN — FENTANYL CITRATE PRN MCG: 50 INJECTION INTRAMUSCULAR; INTRAVENOUS at 07:05

## 2019-03-12 RX ADMIN — MEROPENEM SCH MLS/HR: 500 INJECTION, POWDER, FOR SOLUTION INTRAVENOUS at 17:56

## 2019-03-12 RX ADMIN — OXYCODONE HYDROCHLORIDE AND ACETAMINOPHEN PRN TAB: 5; 325 TABLET ORAL at 15:42

## 2019-03-12 RX ADMIN — MEROPENEM SCH MLS/HR: 500 INJECTION, POWDER, FOR SOLUTION INTRAVENOUS at 11:52

## 2019-03-12 RX ADMIN — OXYCODONE HYDROCHLORIDE AND ACETAMINOPHEN PRN TAB: 5; 325 TABLET ORAL at 08:43

## 2019-03-12 RX ADMIN — FENTANYL CITRATE PRN MCG: 50 INJECTION INTRAMUSCULAR; INTRAVENOUS at 01:47

## 2019-03-12 NOTE — PDOC1
History and Physical


Date of Admission


Date of Admission


DATE: 3/12/19 


TIME: 11:32





Identification/Chief Complaint


Chief Complaint


Nonhealing wounds both feet right greater than left





Source


Source:  Caregiver, Chart review, Patient





History of Present Illness


History of Present Illness


78-year-old white male lives alone at home and he manages his wounds, also 

follows with [facility] wound care. We last saw him January 2019 for similar reasons, 

he has peripheral artery disease and he has chronic nonhealing wounds on both 

feet. The right is greater than the left.


: Consulted ID and vasc surgery - the latter might be planning some 

intervention. The patient has possibly a history of polycythemia vera mentions 

[facility]/Dr. Holland. [facility] sx would like heme on c be consulted before proceeding 

with any intervention


Otherwise on exam,  pain when I attempt to examine/ remove the bandage


Otherwise labs are unimpressive, VS good


BS good





Past Medical History


Cardiovascular:  Hyperlipidemia, Other


Pulmonary:  COPD


Heme/Onc:  Other


Infectious disease:  Other





Past Surgical History


Past Surgical History:  No pertinent history





Family History


Family History:  No Significant





Social History


Smoke:  Quit


ALCOHOL:  none


Drugs:  None





Current Problem List


Problem List


Problems


Medical Problems:


(1) Cellulitis of lower extremity


Status: Acute  











Current Medications


Current Medications





Current Medications


Fentanyl Citrate (Fentanyl 2ml Vial) 50 mcg 1X  ONCE IV  Last administered on 3/

11/19at 18:52;  Start 3/11/19 at 18:15;  Stop 3/11/19 at 18:16;  Status DC


Clindamycin Phosphate 50 ml @  100 mls/hr 1X  ONCE IV  Last administered on 3/11

/19at 20:21;  Start 3/11/19 at 20:00;  Stop 3/11/19 at 20:29;  Status DC


Ondansetron HCl (Zofran) 4 mg PRN Q8HRS  PRN IV NAUSEA/VOMITING;  Start 3/11/19 

at 20:00;  Stop 3/12/19 at 08:11;  Status DC


Fentanyl Citrate (Fentanyl 2ml Vial) 50 mcg PRN Q1HR  PRN IV PAIN Last 

administered on 3/12/19at 07:05;  Start 3/11/19 at 20:00;  Stop 3/12/19 at 19:59


Fentanyl Citrate (Fentanyl 2ml Vial) 50 mcg 1X  ONCE IV  Last administered on 3/

11/19at 20:21;  Start 3/11/19 at 20:15;  Stop 3/11/19 at 20:16;  Status DC


Ondansetron HCl (Zofran) 4 mg PRN Q6HRS  PRN IV NAUSEA/VOMITING;  Start 3/12/19 

at 08:15


Oxycodone/ Acetaminophen (Percocet 5/325) 1 tab PRN Q4HRS  PRN PO PAIN Last 

administered on 3/12/19at 08:43;  Start 3/12/19 at 08:15


Acetaminophen (Tylenol) 500 mg PRN Q6HRS  PRN PO HEADACHE / TEMP;  Start 3/12/

19 at 08:15


Clindamycin Phosphate 50 ml @  100 mls/hr Q8HRS IV ;  Start 3/12/19 at 09:00;  

Stop 3/12/19 at 09:21;  Status DC


Aspirin (Ecotrin) 81 mg DAILYWBKFT PO  Last administered on 3/12/19at 08:43;  

Start 3/12/19 at 09:00


Atorvastatin Calcium (Lipitor) 40 mg QHS PO ;  Start 3/12/19 at 21:00


Magnesium Hydroxide (Milk Of Magnesia) 400 mg PRN DAILY  PRN PO CONSTIPATION;  

Start 3/12/19 at 08:15


Oxycodone/ Acetaminophen (Percocet 5/325) 1 tab PRN Q6HRS  PRN PO PAIN;  Start 3

/12/19 at 08:15;  Status UNV


Linezolid (Zyvox) 600 mg BID PO  Last administered on 3/12/19at 10:07;  Start 3/

12/19 at 09:30


Meropenem 500 mg/ Sodium Chloride 50 ml @  100 mls/hr Q6HRS IV ;  Start 3/12/19 

at 12:00


Micafungin Sodium 100 mg/Dextrose 100 ml @  100 mls/hr Q24H IV  Last 

administered on 3/12/19at 10:07;  Start 3/12/19 at 10:00





Active Scripts


Active


Percocet 5-325 Mg Tablet ** (Oxycodone/Acetaminophen) 1 Each Tablet 1 Tab PO 

PRN Q6HRS PRN


Aspirin Ec (Aspirin) 81 Mg Tablet.dr 81 Mg PO DAILYWBKFT


Atorvastatin Calcium 40 Mg Tablet 40 Mg PO QHS


Reported


Aquino' Milk Of Magnesia (Magnesium Hydroxide) 400 Mg/5 Ml Oral.susp 400 Mg 

PO PRN DAILY PRN


Doxycycline Hyclate 100 Mg Tablet. 1 Tab PO BID 1 Days





Allergies


Allergies:  


Coded Allergies:  


     No Known Drug Allergies (Unverified , 5/6/17)





ROS


Review of System


PAin In the wounds otherwise rest of ROS 14 point negative





Physical Exam


General:  Alert, Oriented X3, Cooperative, No acute distress


HEENT:  Atraumatic, PERRLA, EOMI


Lungs:  Clear to auscultation, Normal air movement


Heart:  S1S2, RRR, no thrills, no rubs, no gallops, no murmurs


Cardiovascular:  S1, S2


Abdomen:  Normal bowel sounds, Soft, No tenderness, No hepatosplenomegaly, No 

masses


Male Genitals Exam:  normal genitalia


PELVIC:  Nml ext genitalia


Skin:  Other (bilateral foot wounds, right greater than the left, thickened skin

, some erythematous inflammation on both shins, no palpable abscess or 

fluctuanceMinimal or weak pulses)


Neuro:  Normal gait, Normal speech, Strength at 5/5 X4 ext, Normal tone, 

Sensation intact, Cranial nerves 3-12 NL, Reflexes 2+


Psych/Mental Status:  Mental status NL, Mood NL





Vitals


Vitals





Vital Signs








  Date Time  Temp Pulse Resp B/P (MAP) Pulse Ox O2 Delivery O2 Flow Rate FiO2


 


3/12/19 11:00 98.5 52 18 103/60 (74) 95 Room Air  





 98.5       











Labs


Labs





Laboratory Tests








Test


 3/11/19


18:38


 


White Blood Count


 8.6 x10^3/uL


(4.0-11.0)


 


Red Blood Count


 5.80 x10^6/uL


(4.30-5.70)


 


Hemoglobin


 17.9 g/dL


(13.0-17.5)


 


Hematocrit


 54.1 %


(39.0-53.0)


 


Mean Corpuscular Volume 93 fL () 


 


Mean Corpuscular Hemoglobin 31 pg (25-35) 


 


Mean Corpuscular Hemoglobin


Concent 33 g/dL


(31-37)


 


Red Cell Distribution Width


 16.7 %


(11.5-14.5)


 


Platelet Count


 189 x10^3/uL


(140-400)


 


Neutrophils (%) (Auto) 76 % (31-73) 


 


Lymphocytes (%) (Auto) 13 % (24-48) 


 


Monocytes (%) (Auto) 9 % (0-9) 


 


Eosinophils (%) (Auto) 2 % (0-3) 


 


Basophils (%) (Auto) 1 % (0-3) 


 


Neutrophils # (Auto)


 6.5 x10^3uL


(1.8-7.7)


 


Lymphocytes # (Auto)


 1.1 x10^3/uL


(1.0-4.8)


 


Monocytes # (Auto)


 0.8 x10^3/uL


(0.0-1.1)


 


Eosinophils # (Auto)


 0.2 x10^3/uL


(0.0-0.7)


 


Basophils # (Auto)


 0.0 x10^3/uL


(0.0-0.2)


 


Sodium Level


 142 mmol/L


(136-145)


 


Potassium Level


 4.3 mmol/L


(3.5-5.1)


 


Chloride Level


 104 mmol/L


()


 


Carbon Dioxide Level


 26 mmol/L


(21-32)


 


Anion Gap 12 (6-14) 


 


Blood Urea Nitrogen


 11 mg/dL


(8-26)


 


Creatinine


 0.9 mg/dL


(0.7-1.3)


 


Estimated GFR


(Cockcroft-Gault) 81.6 





 


BUN/Creatinine Ratio 12 (6-20) 


 


Glucose Level


 92 mg/dL


(70-99)


 


Lactic Acid Level


 1.6 mmol/L


(0.4-2.0)


 


Calcium Level


 9.1 mg/dL


(8.5-10.1)


 


Total Bilirubin


 0.7 mg/dL


(0.2-1.0)


 


Aspartate Amino Transf


(AST/SGOT) 12 U/L (15-37) 





 


Alanine Aminotransferase


(ALT/SGPT) 13 U/L (16-63) 





 


Alkaline Phosphatase


 111 U/L


()


 


Total Protein


 7.1 g/dL


(6.4-8.2)


 


Albumin


 3.5 g/dL


(3.4-5.0)


 


Albumin/Globulin Ratio 1.0 (1.0-1.7) 








Laboratory Tests








Test


 3/11/19


18:38


 


White Blood Count


 8.6 x10^3/uL


(4.0-11.0)


 


Red Blood Count


 5.80 x10^6/uL


(4.30-5.70)


 


Hemoglobin


 17.9 g/dL


(13.0-17.5)


 


Hematocrit


 54.1 %


(39.0-53.0)


 


Mean Corpuscular Volume 93 fL () 


 


Mean Corpuscular Hemoglobin 31 pg (25-35) 


 


Mean Corpuscular Hemoglobin


Concent 33 g/dL


(31-37)


 


Red Cell Distribution Width


 16.7 %


(11.5-14.5)


 


Platelet Count


 189 x10^3/uL


(140-400)


 


Neutrophils (%) (Auto) 76 % (31-73) 


 


Lymphocytes (%) (Auto) 13 % (24-48) 


 


Monocytes (%) (Auto) 9 % (0-9) 


 


Eosinophils (%) (Auto) 2 % (0-3) 


 


Basophils (%) (Auto) 1 % (0-3) 


 


Neutrophils # (Auto)


 6.5 x10^3uL


(1.8-7.7)


 


Lymphocytes # (Auto)


 1.1 x10^3/uL


(1.0-4.8)


 


Monocytes # (Auto)


 0.8 x10^3/uL


(0.0-1.1)


 


Eosinophils # (Auto)


 0.2 x10^3/uL


(0.0-0.7)


 


Basophils # (Auto)


 0.0 x10^3/uL


(0.0-0.2)


 


Sodium Level


 142 mmol/L


(136-145)


 


Potassium Level


 4.3 mmol/L


(3.5-5.1)


 


Chloride Level


 104 mmol/L


()


 


Carbon Dioxide Level


 26 mmol/L


(21-32)


 


Anion Gap 12 (6-14) 


 


Blood Urea Nitrogen


 11 mg/dL


(8-26)


 


Creatinine


 0.9 mg/dL


(0.7-1.3)


 


Estimated GFR


(Cockcroft-Gault) 81.6 





 


BUN/Creatinine Ratio 12 (6-20) 


 


Glucose Level


 92 mg/dL


(70-99)


 


Lactic Acid Level


 1.6 mmol/L


(0.4-2.0)


 


Calcium Level


 9.1 mg/dL


(8.5-10.1)


 


Total Bilirubin


 0.7 mg/dL


(0.2-1.0)


 


Aspartate Amino Transf


(AST/SGOT) 12 U/L (15-37) 





 


Alanine Aminotransferase


(ALT/SGPT) 13 U/L (16-63) 





 


Alkaline Phosphatase


 111 U/L


()


 


Total Protein


 7.1 g/dL


(6.4-8.2)


 


Albumin


 3.5 g/dL


(3.4-5.0)


 


Albumin/Globulin Ratio 1.0 (1.0-1.7) 











VTE Prophylaxis Ordered


VTE Prophylaxis Devices:  Yes


VTE Pharmacological Prophylaxi:  Yes





Assessment/Plan


Assessment/Plan


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


 bl leg cellulitis


PAD with bl iliafemeral A occlusion


hypotension, resolved


h/o dvt, PE


tobacoism


COPD


polythemia vera





PLAN:


2 midnights


Heme oncology vascular, ID CONSULTED


I have reconciled home meds


Pain control as fitted


Other supportive meds


Further recs pending above











GONZALO MEDLEY MD Mar 12, 2019 11:36

## 2019-03-12 NOTE — NUR
Patient refused to have his legs unwrapped and photographed.  Dressings appear to be ABDs 
and Kerlex and are dry and intact.  C/o pain even when touched.

## 2019-03-12 NOTE — PDOC2
CONSULT


Date of Consult


Date of Consult


DATE: 3/12/19 


TIME: 11:34





Reason for Consult


Reason for Consult:


Severe peripheral vascular disease with bilateral lower extremity cellulitis 

and forefoot ulcers





Referring Physician


Referring Physician:


Dr. Thang Lopez





Identification/Chief Complaint


Chief Complaint


Bilateral foot pain with cellulitis and swelling.





Source


Source:  Chart review, Patient





History of Present Illness


Reason for Visit:


The patient is a 78-year-old male well known to Vascular Surgery for severe 

peripheral arterial disease with bilateral lower extremity, non-healing leg/

foot ulcers.  He was admitted last night for onset of worsening bilateral lower 

extremity pain, severe swelling, redness and non-healing ulcers. The patient 

states " You know what this is?  It's cellulitis again."  He reports onset of 

worsening redness and swelling over the weekend and presented to the Emergency 

Room last night.   Patient has a history of polycythemia vera diagnosed with 

bone marrow biopsy and has been seen by Hematology with initiation of 

therapeutic phlebotomy procedures.  His current hemoglobin is 17.9.  In January 

of this year, recommendation was made by Vascular Surgery for an aortobifemoral 

bypass due to bilateral common and right iliac artery occlusion, along with 

right superficial femoral artery occlusion.  He was seen by pulmonary and 

cardiology medicine and cleared to proceed with surgery.  He was to follow up 

with hematology for treatment of his polycythemia vera with a target hemoglobin 

of below 15 before proceeding with revascularization.  The patient reports he 

was having IV treatments (of which he cannot recall what these were called) and 

was to follow up with blood checks with a doctor at [facility].  I have 

taken the liberty of having our office check the  system for records and the 

patient is listed as having multiple no-shows with the hematology group.  He 

was scheduled to have follow up with vascular surgery on February 20, 2019 and 

he did not show for this appointment and was rescheduled to see Dr. Pink on 

March 27, 2019.  The patient has a history of non-compliance with his medical 

regimen.  The patient has a remote history of right femoral to popliteal bypass 

in 2008.  He continues to smoke.





Past Medical History


Cardiovascular:  Hyperlipidemia, Other (Peripheral vascular disease)


Pulmonary:  COPD, Pulmonary embolus, Other (Tobaccoism)


GI:  No pertinent hx


Heme/Onc:  Other (Polycythemia vera with thrombocytopenia)


Musculoskeletal:  Swelling (Severe bilateral lower leg)


ENT:  No pertinent hx


Renal/:  No pertinent hx


Dermatology:  Cellulitis, Other (Bilateral, chronic forefoot non-healing ulcers)





Past Surgical History


Past Surgical History:  Hernia Repair, Other (Right femoral to popliteal bypass 

2008)





Family History


Family History:  No Significant





Social History


<1 pack per day (States he "does not inhale.")


ALCOHOL:  none


Drugs:  None





Current Problem List


Problem List


Problems


Medical Problems:


(1) Cellulitis of lower extremity


(2) Peripheral arterial disease, potential limb-threatening 


(3) Chronic non-healing bilateral forefoot ulcers


(4) Polycythemia vera





Status: Acute  








Current Medications


Current Medications





Current Medications


Fentanyl Citrate (Fentanyl 2ml Vial) 50 mcg 1X  ONCE IV  Last administered on 3/

11/19at 18:52;  Start 3/11/19 at 18:15;  Stop 3/11/19 at 18:16;  Status DC


Clindamycin Phosphate 50 ml @  100 mls/hr 1X  ONCE IV  Last administered on 3/11

/19at 20:21;  Start 3/11/19 at 20:00;  Stop 3/11/19 at 20:29;  Status DC


Ondansetron HCl (Zofran) 4 mg PRN Q8HRS  PRN IV NAUSEA/VOMITING;  Start 3/11/19 

at 20:00;  Stop 3/12/19 at 08:11;  Status DC


Fentanyl Citrate (Fentanyl 2ml Vial) 50 mcg PRN Q1HR  PRN IV PAIN Last 

administered on 3/12/19at 07:05;  Start 3/11/19 at 20:00;  Stop 3/12/19 at 19:59


Fentanyl Citrate (Fentanyl 2ml Vial) 50 mcg 1X  ONCE IV  Last administered on 3/

11/19at 20:21;  Start 3/11/19 at 20:15;  Stop 3/11/19 at 20:16;  Status DC


Ondansetron HCl (Zofran) 4 mg PRN Q6HRS  PRN IV NAUSEA/VOMITING;  Start 3/12/19 

at 08:15


Oxycodone/ Acetaminophen (Percocet 5/325) 1 tab PRN Q4HRS  PRN PO PAIN Last 

administered on 3/12/19at 08:43;  Start 3/12/19 at 08:15


Acetaminophen (Tylenol) 500 mg PRN Q6HRS  PRN PO HEADACHE / TEMP;  Start 3/12/

19 at 08:15


Clindamycin Phosphate 50 ml @  100 mls/hr Q8HRS IV ;  Start 3/12/19 at 09:00;  

Stop 3/12/19 at 09:21;  Status DC


Aspirin (Ecotrin) 81 mg DAILYWBKFT PO  Last administered on 3/12/19at 08:43;  

Start 3/12/19 at 09:00


Atorvastatin Calcium (Lipitor) 40 mg QHS PO ;  Start 3/12/19 at 21:00


Magnesium Hydroxide (Milk Of Magnesia) 400 mg PRN DAILY  PRN PO CONSTIPATION;  

Start 3/12/19 at 08:15


Oxycodone/ Acetaminophen (Percocet 5/325) 1 tab PRN Q6HRS  PRN PO PAIN;  Start 3

/12/19 at 08:15;  Status UNV


Linezolid (Zyvox) 600 mg BID PO  Last administered on 3/12/19at 10:07;  Start 3/

12/19 at 09:30


Meropenem 500 mg/ Sodium Chloride 50 ml @  100 mls/hr Q6HRS IV ;  Start 3/12/19 

at 12:00


Micafungin Sodium 100 mg/Dextrose 100 ml @  100 mls/hr Q24H IV  Last 

administered on 3/12/19at 10:07;  Start 3/12/19 at 10:00





Active Scripts


Active


Percocet 5-325 Mg Tablet ** (Oxycodone/Acetaminophen) 1 Each Tablet 1 Tab PO 

PRN Q6HRS PRN


Aspirin Ec (Aspirin) 81 Mg Tablet. 81 Mg PO DAILYWBKFT


Atorvastatin Calcium 40 Mg Tablet 40 Mg PO QHS


Reported


Aquino' Milk Of Magnesia (Magnesium Hydroxide) 400 Mg/5 Ml Oral.susp 400 Mg 

PO PRN DAILY PRN


Doxycycline Hyclate 100 Mg Tablet. 1 Tab PO BID 1 Days





Allergies


Allergies:  


Coded Allergies:  


     No Known Drug Allergies (Unverified , 5/6/17)





ROS


General:  No: Chills, Night Sweats, Fatigue, Malaise


HEENT:  No: Heacaches, Visual Changes, Nasal congestion, Sore Throat, Epistaxis

, Vertigo


Hematological and Lymphatic:  YES: Blood Clots; 


   No: Bleeding Problems, Blood Transfusions, Brusing, Night Sweats


ENDOCRINE:  No: Unexpected Weight Changes


Respiratory:  YES: Cough, SOB with excertion, Wheezing; 


   No: Hemoptysis, Orthopnea, Pleuritic Pain, Shortness of breath, Sputum 

Changes, Tachypnea


Cardiovascular:  yes Edema (Bilateral lower extremities); 


   No Chest Pain, No Palpitations, No Paroxysmal Noc. Dyspnea


Gastrointestinal:  No Nausea, No Vomiting, No Abdominal Pain, No Diarrhea, No 

Constipation, No Melena


Genitourinary:  YES , YES ; 


   No Dysuria, No Frequency, No Incontinence, No Hematuria, No Retention, No 

Discharge, No Urgency


Musculoskeletal:  Yes Pain In: (Bilateral forefoot pain), Yes Swelling In: (

Bilateral lower extremities); 


   No Gait Disturbance, No Joint Pain, No Joint Stiffness, No Joint Swelling, 

No Muscle Pain, No Muscular Weakness


Skin:  Yes Dry Skin, Yes Other (Cellulitis to bilateral lower extremities)





Physical Exam


General:  Alert, Oriented X3, Cooperative, mild distress


HEENT:  Atraumatic, PERRLA, Other (Negative for carotid bruit)


Lungs:  Other (Bilateral scattered crackles with expiratory wheezes.  Very 

diminished in left base.)


Heart:  Regular rate (with occassional missed beats), Normal S1, Normal S2, No 

murmurs, Other (Palpable bilateral carotid, brachial and radial pulses.  Unable 

to palpate femoral, popliteal, dorsalis pedal or post tibial pulses.)


Abdomen:  Normal bowel sounds, Soft, No tenderness


Extremities:  Other (Improved lower extremity swelling with diuresis in ER. )


Skin:  Other (Well-healed right leg incision from previous bypass.  Right 

forefoot odorous, open ulcer with necrotic tissue to wound base.  Left forefoot 

skin discolored.  Bilateral lower extremity erythema with cellulitis and dry-

flaking skin.  Extreme pain to very light palpation of toes and forefoot.  

Cracked fissures noted to plantar side of 1st toe.  )





Vitals


VITALS





Vital Signs








  Date Time  Temp Pulse Resp B/P (MAP) Pulse Ox O2 Delivery O2 Flow Rate FiO2


 


3/12/19 11:00 98.5 52 18 103/60 (74) 95 Room Air  





 98.5       











Labs


Labs





Laboratory Tests








Test


 3/11/19


18:38


 


White Blood Count


 8.6 x10^3/uL


(4.0-11.0)


 


Red Blood Count


 5.80 x10^6/uL


(4.30-5.70)


 


Hemoglobin


 17.9 g/dL


(13.0-17.5)


 


Hematocrit


 54.1 %


(39.0-53.0)


 


Mean Corpuscular Volume 93 fL () 


 


Mean Corpuscular Hemoglobin 31 pg (25-35) 


 


Mean Corpuscular Hemoglobin


Concent 33 g/dL


(31-37)


 


Red Cell Distribution Width


 16.7 %


(11.5-14.5)


 


Platelet Count


 189 x10^3/uL


(140-400)


 


Neutrophils (%) (Auto) 76 % (31-73) 


 


Lymphocytes (%) (Auto) 13 % (24-48) 


 


Monocytes (%) (Auto) 9 % (0-9) 


 


Eosinophils (%) (Auto) 2 % (0-3) 


 


Basophils (%) (Auto) 1 % (0-3) 


 


Neutrophils # (Auto)


 6.5 x10^3uL


(1.8-7.7)


 


Lymphocytes # (Auto)


 1.1 x10^3/uL


(1.0-4.8)


 


Monocytes # (Auto)


 0.8 x10^3/uL


(0.0-1.1)


 


Eosinophils # (Auto)


 0.2 x10^3/uL


(0.0-0.7)


 


Basophils # (Auto)


 0.0 x10^3/uL


(0.0-0.2)


 


Sodium Level


 142 mmol/L


(136-145)


 


Potassium Level


 4.3 mmol/L


(3.5-5.1)


 


Chloride Level


 104 mmol/L


()


 


Carbon Dioxide Level


 26 mmol/L


(21-32)


 


Anion Gap 12 (6-14) 


 


Blood Urea Nitrogen


 11 mg/dL


(8-26)


 


Creatinine


 0.9 mg/dL


(0.7-1.3)


 


Estimated GFR


(Cockcroft-Gault) 81.6 





 


BUN/Creatinine Ratio 12 (6-20) 


 


Glucose Level


 92 mg/dL


(70-99)


 


Lactic Acid Level


 1.6 mmol/L


(0.4-2.0)


 


Calcium Level


 9.1 mg/dL


(8.5-10.1)


 


Total Bilirubin


 0.7 mg/dL


(0.2-1.0)


 


Aspartate Amino Transf


(AST/SGOT) 12 U/L (15-37) 





 


Alanine Aminotransferase


(ALT/SGPT) 13 U/L (16-63) 





 


Alkaline Phosphatase


 111 U/L


()


 


Total Protein


 7.1 g/dL


(6.4-8.2)


 


Albumin


 3.5 g/dL


(3.4-5.0)


 


Albumin/Globulin Ratio 1.0 (1.0-1.7) 








Laboratory Tests








Test


 3/11/19


18:38


 


White Blood Count


 8.6 x10^3/uL


(4.0-11.0)


 


Red Blood Count


 5.80 x10^6/uL


(4.30-5.70)


 


Hemoglobin


 17.9 g/dL


(13.0-17.5)


 


Hematocrit


 54.1 %


(39.0-53.0)


 


Mean Corpuscular Volume 93 fL () 


 


Mean Corpuscular Hemoglobin 31 pg (25-35) 


 


Mean Corpuscular Hemoglobin


Concent 33 g/dL


(31-37)


 


Red Cell Distribution Width


 16.7 %


(11.5-14.5)


 


Platelet Count


 189 x10^3/uL


(140-400)


 


Neutrophils (%) (Auto) 76 % (31-73) 


 


Lymphocytes (%) (Auto) 13 % (24-48) 


 


Monocytes (%) (Auto) 9 % (0-9) 


 


Eosinophils (%) (Auto) 2 % (0-3) 


 


Basophils (%) (Auto) 1 % (0-3) 


 


Neutrophils # (Auto)


 6.5 x10^3uL


(1.8-7.7)


 


Lymphocytes # (Auto)


 1.1 x10^3/uL


(1.0-4.8)


 


Monocytes # (Auto)


 0.8 x10^3/uL


(0.0-1.1)


 


Eosinophils # (Auto)


 0.2 x10^3/uL


(0.0-0.7)


 


Basophils # (Auto)


 0.0 x10^3/uL


(0.0-0.2)


 


Sodium Level


 142 mmol/L


(136-145)


 


Potassium Level


 4.3 mmol/L


(3.5-5.1)


 


Chloride Level


 104 mmol/L


()


 


Carbon Dioxide Level


 26 mmol/L


(21-32)


 


Anion Gap 12 (6-14) 


 


Blood Urea Nitrogen


 11 mg/dL


(8-26)


 


Creatinine


 0.9 mg/dL


(0.7-1.3)


 


Estimated GFR


(Cockcroft-Gault) 81.6 





 


BUN/Creatinine Ratio 12 (6-20) 


 


Glucose Level


 92 mg/dL


(70-99)


 


Lactic Acid Level


 1.6 mmol/L


(0.4-2.0)


 


Calcium Level


 9.1 mg/dL


(8.5-10.1)


 


Total Bilirubin


 0.7 mg/dL


(0.2-1.0)


 


Aspartate Amino Transf


(AST/SGOT) 12 U/L (15-37) 





 


Alanine Aminotransferase


(ALT/SGPT) 13 U/L (16-63) 





 


Alkaline Phosphatase


 111 U/L


()


 


Total Protein


 7.1 g/dL


(6.4-8.2)


 


Albumin


 3.5 g/dL


(3.4-5.0)


 


Albumin/Globulin Ratio 1.0 (1.0-1.7) 











Images


Images


PROCEDURE: VENOUS LOWER EXT BILATERAL


Left leg venous Doppler study:


 Clinical indications: Left leg and edema. Greater saphenous vein has been 


surgically removed.


 


Impression: There are no sonographic findings of deep venous thrombosis 


within the veins discussed above of the left lower extremity.


 


Right leg venous Doppler study:


Clinical indications: Right leg edema. Greater saphenous vein has been 


surgically removed.


 


Impression: There are no sonographic findings of deep venous thrombosis 


within the veins discussed above of the right lower extremity.





PROCEDURE: CT ANGIO ABD ILEO/FEMOR RUNOFF December 2018


 


COMPARISON: 5/6/2017


 


FINDINGS:


Heart is normal in size. No pericardial or pleural effusion. Mild diffuse 


atherosclerotic disease seen of the suprarenal aorta. The celiac axis, 


splenic artery, left gastric artery, common hepatic artery, SMA, bilateral


renal arteries are patent. Moderate atherosclerotic disease is seen of the


infrarenal aorta with circumferential soft plaque and mild narrowing of 


the aortic lumen. Stable complete occlusion of the right common iliac 


artery, external iliac artery. The right internal iliac artery supplied by


lumbar collaterals. Mild narrowing seen in the proximal left common iliac 


artery which demonstrates circumferential soft plaque. There is complete 


occlusion of the left external iliac artery which is new from previous 


exam. The left internal iliac arteries patent. The bilateral common 


femoral arteries are occluded. The right superficial femoral artery and 


popliteal arteries are occluded. The right profunda femoris artery is 


patent. The right anterior tibial artery is nonvisualized which may be 


secondary to diffuse high-grade stenosis or occlusion. The right peroneal 


artery is patent up to the level of hindfoot. The right posterior tibial 


artery is patent up to the level of plantar branches. The peroneal and 


posterior tibial arteries are supplied by collaterals from genicular  


arteries.


 


The left superficial femoral and popliteal artery is occluded. The left 


profunda femoris artery is patent and is supplied by gluteal collaterals. 


The anterior tibial artery is nonopacified likely secondary to diffuse 


high-grade stenosis or occlusion. The left peroneal arteries are 


visualized. The posterior tibial artery is patent up to the level of 


plantar branches.


 


Mild bibasilar subsegmental atelectasis or scarring seen in the subpleural


region.


 


The arterial phase appearance of the liver, spleen, gallbladder, pancreas,


adrenals and kidneys is within normal limits. No enlarged retroperitoneal 


or pelvic adenopathy. No free pelvic fluid or ascites. Small bilateral 


fat-containing inguinal hernias. Prostate is enlarged measuring 5.8 x 4.8 


cm. Urinary bladder within normal limits. No bowel obstruction. No 


enlarged retroperitoneal or pelvic adenopathy. No pneumoperitoneum. Small 


omental fat-containing medical hernia. Enlarged right groin lymph node 


measuring 2.7 x 1.0 cm and left groin lymph node measuring 1.8 x 1.1 cm. 


No suspicious bony lesion.


 


IMPRESSION:


1. 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 


described above.


2. New complete occlusion of the left iliofemoral arterial system as 


described above. 


3. Nonvisualization of the bilateral anterior tibial arteries be secondary


to a diffuse high-grade stenosis or complete occlusion. Please see above 


for details.


4. Enlarged prostate. Correlate with physical exam and PSA. 


5. Moderate atherosclerotic disease of the infrarenal aorta with 


circumferential soft plaque.


6. Mildly enlarged bilateral inguinal lymph nodes, nonspecific most likely


reactive.


 


Critical findings were identified on 12/23/2018 3:39 PM, read back and 


verified with Nurse Catina on 12/23/2018 3:58 PM by Dr. Edilberto Wong DO.


 


Electronically signed by: Edilberto Wong DO (12/23/2018 3:58 PM) [facility]





Assessment/Plan


Assessment/Plan


The patient is a 78-year-old male well known to Vascular Surgery for severe 

peripheral arterial disease with bilateral lower extremity, non-healing leg/

foot ulcers who has been admitted for worsening bilateral lower extremity pain, 

severe swelling, redness and non-healing ulcers.  In January of this year, 

recommendation was made by Vascular Surgery for an aortobifemoral bypass due to 

bilateral common and right iliac artery occlusion, along with right superficial 

femoral artery occlusion.  He was seen by pulmonary and cardiology medicine and 

cleared to proceed with surgery.  Patient has a history of polycythemia vera 

diagnosed with bone marrow biopsy and has been seen by Hematology with 

initiation of therapeutic phlebotomy procedures with a target hemoglobin of 

below 15 before proceeding with revascularization.  He was scheduled to have 

follow up with vascular surgery on February 20, 2019 and did not keep his 

appointment.  The patient has been seen by Infectious Disease this morning and 

IV antibiotic therapy has been initiated.  He has also received diuresis in the 

ER with improvement in peripheral swelling.





The patient's lower extremity wounds have worsened and he is having progressive 

deterioration of his tissues from severe arterial disease, possible limb-

threatening with onset of severe forefoot and toe pain to minimal touch.  

Unfortunately, the patient continues to smoke.  The patient verbalizes that he 

would agree to proceed with the surgical plan that was outlined in great detail 

in the past by Dr.'s Maggie Mcnair and Matt Pink.  I will take the liberty of 

consulting Hematology once again for known polycythemia vera in preparation for 

surgical revascularization.  The patient is on low-dose aspirin.  He reports 

having been on full anticoagulation in the past for a pulmonary embolism but is 

not on anticoagulation therapy currently.  





Dr. Uriah Leiva will see the patient this afternoon and determine if previous 

testing is adequate as Aortogram with run-off was performed in December 2018.  

The patient had a previous vein mapping of bilateral upper extremities in 

December as well due to bilateral saphenous vein harvest for previous bypass.  

I will obtain a 2-view chest x-ray in anticipation for an operative procedure.  

He is currently wheezing and with crackles throughout.  We may need formal 

consultation with Pulmonary medicine if the plan is proceed within this 

hospitalization for an ABF.





Thank you for the opportunity to participate in the care of this patient.  Dr. Leiva will provide additional evaluation and recommendations later this 

afternoon.











KIESHA SALEH Mar 12, 2019 12:34

## 2019-03-12 NOTE — RAD
Chest, 2 views, 3/12/2019:

 

HISTORY: Preop evaluation for possible bypass surgery

 

Comparison is made to a study from 12/22/2018. The heart is of normal 

size. There are mild streaky bibasilar opacities compatible with scarring,

although a component of linear atelectasis may also be present. No 

consolidating infiltrate is seen. There is no evidence of pleural fluid. 

Mild spurring is present in the spine.

 

IMPRESSION: Mild bibasilar linear atelectasis and/or scarring.

 

Electronically signed by: Rick Moritz, MD (3/12/2019 4:33 PM) [facility]

## 2019-03-12 NOTE — RAD
Portable left foot, 3 views, 3/11/2019:

 

HISTORY: Pain, wound

 

No fracture or destructive bony lesion is seen. There is mild diffuse soft

tissue swelling.

 

IMPRESSION: No acute bony abnormality is detected.

 

Electronically signed by: Rick Moritz, MD (3/12/2019 7:36 AM) [facility]

## 2019-03-12 NOTE — PDOC2
CONSULT


Date of Consult


Date of Consult


DATE: 3/12/19 


TIME: 12:42





Reason for Consult


Reason for Consult:


symptomatic nails and non healing wounds bilateral foot





Referring Physician


Referring Physician:


Tasneem





Identification/Chief Complaint


Chief Complaint


Long thick symptomatic nails and non healing wounds bilateral feet





Source


Source:  Chart review, Patient





History of Present Illness


Reason for Visit:


78 year old male admitted to [facility] for cellulitis, PAD, non healing wounds 

bilateral feet.  Patient with history of Bilateral illiofemoral arterial 

occlusion.  He states wound present x 1  year.  He is being treated at the [facility] 

wound care clinic and applying local wound care with xeroform gauze bandage.  

He notes pain and stiffness to feet.  he also relates to long thickened 

symptomatic toenails.  He normally sees a podiatrist but has not been to see 

one in quite some time  he notes wound to right hallux nail and fissure left 

hallux at sulcus.





Past Medical History


Cardiovascular:  Hyperlipidemia, Other (Peripheral vascular disease)


Pulmonary:  COPD, Pulmonary embolus, Other (Tobaccoism)


GI:  No pertinent hx


Heme/Onc:  Other (Polycythemia vera with thrombocytopenia)


Musculoskeletal:  Swelling (Severe bilateral lower leg)


ENT:  No pertinent hx


Renal/:  No pertinent hx


Dermatology:  Cellulitis, Other (Bilateral, chronic forefoot non-healing ulcers)





Past Surgical History


Past Surgical History:  Hernia Repair, Other (Right femoral to popliteal bypass 

2008)





Family History


Family History:  No Significant





Social History


<1 pack per day (States he "does not inhale.")


ALCOHOL:  none


Drugs:  None


Lives:  Alone





Current Problem List


Problem List


Problems


Medical Problems:


(1) Cellulitis of lower extremity


Status: Acute  











Current Medications


Current Medications





Current Medications


Fentanyl Citrate (Fentanyl 2ml Vial) 50 mcg 1X  ONCE IV  Last administered on 3/

11/19at 18:52;  Start 3/11/19 at 18:15;  Stop 3/11/19 at 18:16;  Status DC


Clindamycin Phosphate 50 ml @  100 mls/hr 1X  ONCE IV  Last administered on 3/11

/19at 20:21;  Start 3/11/19 at 20:00;  Stop 3/11/19 at 20:29;  Status DC


Ondansetron HCl (Zofran) 4 mg PRN Q8HRS  PRN IV NAUSEA/VOMITING;  Start 3/11/19 

at 20:00;  Stop 3/12/19 at 08:11;  Status DC


Fentanyl Citrate (Fentanyl 2ml Vial) 50 mcg PRN Q1HR  PRN IV PAIN Last 

administered on 3/12/19at 07:05;  Start 3/11/19 at 20:00;  Stop 3/12/19 at 19:59


Fentanyl Citrate (Fentanyl 2ml Vial) 50 mcg 1X  ONCE IV  Last administered on 3/

11/19at 20:21;  Start 3/11/19 at 20:15;  Stop 3/11/19 at 20:16;  Status DC


Ondansetron HCl (Zofran) 4 mg PRN Q6HRS  PRN IV NAUSEA/VOMITING;  Start 3/12/19 

at 08:15


Oxycodone/ Acetaminophen (Percocet 5/325) 1 tab PRN Q4HRS  PRN PO PAIN Last 

administered on 3/12/19at 08:43;  Start 3/12/19 at 08:15


Acetaminophen (Tylenol) 500 mg PRN Q6HRS  PRN PO HEADACHE / TEMP;  Start 3/12/

19 at 08:15


Clindamycin Phosphate 50 ml @  100 mls/hr Q8HRS IV ;  Start 3/12/19 at 09:00;  

Stop 3/12/19 at 09:21;  Status DC


Aspirin (Ecotrin) 81 mg DAILYWBKFT PO  Last administered on 3/12/19at 08:43;  

Start 3/12/19 at 09:00


Atorvastatin Calcium (Lipitor) 40 mg QHS PO ;  Start 3/12/19 at 21:00


Magnesium Hydroxide (Milk Of Magnesia) 400 mg PRN DAILY  PRN PO CONSTIPATION;  

Start 3/12/19 at 08:15


Oxycodone/ Acetaminophen (Percocet 5/325) 1 tab PRN Q6HRS  PRN PO PAIN;  Start 3

/12/19 at 08:15;  Status UNV


Linezolid (Zyvox) 600 mg BID PO  Last administered on 3/12/19at 10:07;  Start 3/

12/19 at 09:30


Meropenem 500 mg/ Sodium Chloride 50 ml @  100 mls/hr Q6HRS IV ;  Start 3/12/19 

at 12:00


Micafungin Sodium 100 mg/Dextrose 100 ml @  100 mls/hr Q24H IV  Last 

administered on 3/12/19at 10:07;  Start 3/12/19 at 10:00





Active Scripts


Active


Percocet 5-325 Mg Tablet ** (Oxycodone/Acetaminophen) 1 Each Tablet 1 Tab PO 

PRN Q6HRS PRN


Aspirin Ec (Aspirin) 81 Mg Tablet. 81 Mg PO DAILYWBKFT


Atorvastatin Calcium 40 Mg Tablet 40 Mg PO QHS


Reported


Aquino' Milk Of Magnesia (Magnesium Hydroxide) 400 Mg/5 Ml Oral.susp 400 Mg 

PO PRN DAILY PRN


Doxycycline Hyclate 100 Mg Tablet. 1 Tab PO BID 1 Days





Allergies


Allergies:  


Coded Allergies:  


     No Known Drug Allergies (Unverified , 5/6/17)





ROS


General:  YES: Fatigue; 


   No: Chills, Night Sweats, Malaise, Appetite, Other


PSYCHOLOGICAL ROS:  No: Anxiety, Behavioral Disorder, Concentration difficultie

, Decreased libido, Depression, Disorientation, Hallucinations, Hostility, 

Irritablity, Memory difficulties, Mood Swings, Obsessive thoughts, Physical 

abuse, Sexual abuse, Sleep disturbances, Suicidal ideation, Other


Eyes:  No Blurry vision, No Decreased vision, No Double vision, No Dry eyes, No 

Excessive tearing, No Eye Pain, No Itchy Eyes, No Loss of vision, No Photophobia

, No Scotomata, No Uses contacts, No Uses glasses, No Other


HEENT:  No: Heacaches, Visual Changes, Hearing change, Nasal congestion, Nasal 

discharge, Oral lesions, Sinus pain, Sore Throat, Epistaxis, Sneezing, Snoring, 

Tinnitus, Vertigo, Vocal changes, Other


ALLERGY AND IMMUNOLOGY:  No: Hives, Insect Bite Sensitivity, Itchy/Watery Eyes, 

Nasal Congestion, Post Nasal Drip, Seasonal Allergies, Other


Hematological and Lymphatic:  YES: Bleeding Problems


ENDOCRINE:  YES: Skin Changes; 


   No: Breast Changes, Galactorrhea, Hair Pattern Changes, Hot Flashes, Malaise/

lethargy, Mood Swings, Palpitations, Polydipsia/polyuria, Temperature 

Intolerance, Unexpected Weight Changes, Other


Respiratory:  No: Cough, Hemoptysis, Orthopnea, Pleuritic Pain, Shortness of 

breath, SOB with excertion, Sputum Changes, Stridor, Tachypnea, Wheezing, Other


Cardiovascular:  No Chest Pain, No Palpitations, No Orthopnea, No Paroxysmal 

Noc. Dyspnea, No Edema, No Lt Headedness, No Other


Gastrointestinal:  No Nausea, No Vomiting, No Abdominal Pain, No Diarrhea, No 

Constipation, No Melena, No Hematochezia, No Other


Musculoskeletal:  Yes Joint Pain, Yes Joint Stiffness, Yes Muscular Weakness


Neurological:  No Behavorial Changes, No Bowel/Bladder ControlChng, No Confusion

, No Dizziness, No Gait Disturbance, No Headaches, No Impaired Coord/balance, 

No Memory Loss, No Numbness/Tingling, No Seizures, No Speech Problems, No 

Tremors, No Visual Changes, No Weakness, No Other


Skin:  Yes Dry Skin, Yes Hair Changes, Yes Nail Changes





Physical Exam


Physical Exam


Lower extremity: skin is cool, extremely xerotic, atrophic.  taught.  No hair 

is present to feet.  nails are long (onychogryphotic), thickened 5mm and yellow 

brown discolored x 10.  Note loosening of right hallux nail lateral 1/2 with 

maceration and superficial discontinuity of skin to nail bed.  Note skin 

fissure with superficial discontinuity of skin to plantar sulcus left hallux.  

Note full thickness ulceration to dorsal right midfoot with necrotic base 

measures approximately 5x5x0.3cm.  Localized erythema.  Note superficial 

ulceration to dorsal left forefoot measurs 0.3x0.2x0.1cm.  Note skin sloughing 

to bilateral lower extremity.  +2 edema to bilateral lower extremity.  DP and 

PT 0/4 bilateral.  CFT delayed to digits.  Note dorsally contracted digits 2-5 

bilateral.  note overlap of 2nd digit to hallux right foot.  Toes are stiff. 

Pain with range of motion and palpation.


General:  Alert, mild distress





Vitals


VITALS





Vital Signs








  Date Time  Temp Pulse Resp B/P (MAP) Pulse Ox O2 Delivery O2 Flow Rate FiO2


 


3/12/19 11:00 98.5 52 18 103/60 (74) 95 Room Air  





 98.5       











Labs


Labs





Laboratory Tests








Test


 3/11/19


18:38


 


White Blood Count


 8.6 x10^3/uL


(4.0-11.0)


 


Red Blood Count


 5.80 x10^6/uL


(4.30-5.70)


 


Hemoglobin


 17.9 g/dL


(13.0-17.5)


 


Hematocrit


 54.1 %


(39.0-53.0)


 


Mean Corpuscular Volume 93 fL () 


 


Mean Corpuscular Hemoglobin 31 pg (25-35) 


 


Mean Corpuscular Hemoglobin


Concent 33 g/dL


(31-37)


 


Red Cell Distribution Width


 16.7 %


(11.5-14.5)


 


Platelet Count


 189 x10^3/uL


(140-400)


 


Neutrophils (%) (Auto) 76 % (31-73) 


 


Lymphocytes (%) (Auto) 13 % (24-48) 


 


Monocytes (%) (Auto) 9 % (0-9) 


 


Eosinophils (%) (Auto) 2 % (0-3) 


 


Basophils (%) (Auto) 1 % (0-3) 


 


Neutrophils # (Auto)


 6.5 x10^3uL


(1.8-7.7)


 


Lymphocytes # (Auto)


 1.1 x10^3/uL


(1.0-4.8)


 


Monocytes # (Auto)


 0.8 x10^3/uL


(0.0-1.1)


 


Eosinophils # (Auto)


 0.2 x10^3/uL


(0.0-0.7)


 


Basophils # (Auto)


 0.0 x10^3/uL


(0.0-0.2)


 


Sodium Level


 142 mmol/L


(136-145)


 


Potassium Level


 4.3 mmol/L


(3.5-5.1)


 


Chloride Level


 104 mmol/L


()


 


Carbon Dioxide Level


 26 mmol/L


(21-32)


 


Anion Gap 12 (6-14) 


 


Blood Urea Nitrogen


 11 mg/dL


(8-26)


 


Creatinine


 0.9 mg/dL


(0.7-1.3)


 


Estimated GFR


(Cockcroft-Gault) 81.6 





 


BUN/Creatinine Ratio 12 (6-20) 


 


Glucose Level


 92 mg/dL


(70-99)


 


Lactic Acid Level


 1.6 mmol/L


(0.4-2.0)


 


Calcium Level


 9.1 mg/dL


(8.5-10.1)


 


Total Bilirubin


 0.7 mg/dL


(0.2-1.0)


 


Aspartate Amino Transf


(AST/SGOT) 12 U/L (15-37) 





 


Alanine Aminotransferase


(ALT/SGPT) 13 U/L (16-63) 





 


Alkaline Phosphatase


 111 U/L


()


 


Total Protein


 7.1 g/dL


(6.4-8.2)


 


Albumin


 3.5 g/dL


(3.4-5.0)


 


Albumin/Globulin Ratio 1.0 (1.0-1.7) 








Laboratory Tests








Test


 3/11/19


18:38


 


White Blood Count


 8.6 x10^3/uL


(4.0-11.0)


 


Red Blood Count


 5.80 x10^6/uL


(4.30-5.70)


 


Hemoglobin


 17.9 g/dL


(13.0-17.5)


 


Hematocrit


 54.1 %


(39.0-53.0)


 


Mean Corpuscular Volume 93 fL () 


 


Mean Corpuscular Hemoglobin 31 pg (25-35) 


 


Mean Corpuscular Hemoglobin


Concent 33 g/dL


(31-37)


 


Red Cell Distribution Width


 16.7 %


(11.5-14.5)


 


Platelet Count


 189 x10^3/uL


(140-400)


 


Neutrophils (%) (Auto) 76 % (31-73) 


 


Lymphocytes (%) (Auto) 13 % (24-48) 


 


Monocytes (%) (Auto) 9 % (0-9) 


 


Eosinophils (%) (Auto) 2 % (0-3) 


 


Basophils (%) (Auto) 1 % (0-3) 


 


Neutrophils # (Auto)


 6.5 x10^3uL


(1.8-7.7)


 


Lymphocytes # (Auto)


 1.1 x10^3/uL


(1.0-4.8)


 


Monocytes # (Auto)


 0.8 x10^3/uL


(0.0-1.1)


 


Eosinophils # (Auto)


 0.2 x10^3/uL


(0.0-0.7)


 


Basophils # (Auto)


 0.0 x10^3/uL


(0.0-0.2)


 


Sodium Level


 142 mmol/L


(136-145)


 


Potassium Level


 4.3 mmol/L


(3.5-5.1)


 


Chloride Level


 104 mmol/L


()


 


Carbon Dioxide Level


 26 mmol/L


(21-32)


 


Anion Gap 12 (6-14) 


 


Blood Urea Nitrogen


 11 mg/dL


(8-26)


 


Creatinine


 0.9 mg/dL


(0.7-1.3)


 


Estimated GFR


(Cockcroft-Gault) 81.6 





 


BUN/Creatinine Ratio 12 (6-20) 


 


Glucose Level


 92 mg/dL


(70-99)


 


Lactic Acid Level


 1.6 mmol/L


(0.4-2.0)


 


Calcium Level


 9.1 mg/dL


(8.5-10.1)


 


Total Bilirubin


 0.7 mg/dL


(0.2-1.0)


 


Aspartate Amino Transf


(AST/SGOT) 12 U/L (15-37) 





 


Alanine Aminotransferase


(ALT/SGPT) 13 U/L (16-63) 





 


Alkaline Phosphatase


 111 U/L


()


 


Total Protein


 7.1 g/dL


(6.4-8.2)


 


Albumin


 3.5 g/dL


(3.4-5.0)


 


Albumin/Globulin Ratio 1.0 (1.0-1.7) 











Assessment/Plan


Assessment/Plan


78 year old male with severe peripheral arterial disease, chronic wound to 

dorsal bilateral feet, clinical onychomycosis, onychogryphosis, onychocryptosis.


-Nails debrided x 10.  


-Begin daily local wound care to right hallux with wet to dry gauze bandage.  


-Continue local wound care to bilateral foot wounds.  Recommend add RX Santyl 

once daily


-Awaiting vascular surgery evaluation and possible intervention


-Discussed possible wound debridement and application of allograft pending 

vascular enhancement.  


-Patient following with [facility]ound care


-Will sign off. 


-Reconsult as needed.











NINA TORRES DPM Mar 12, 2019 12:55

## 2019-03-12 NOTE — RAD
Indication:S/P WOUNDS, PAIN

 

TECHNIQUE: 3 views of the right foot

 

COMPARISON:None

 

FINDINGS/

impression:

No acute fracture or dislocation. Diffuse soft tissue edema. No 

significant arthritic changes. No periosteal reaction or cortical erosion 

to suggest radiographic signs of osteomyelitis. If concern for 

osteomyelitis persists consider further evaluation with triple phase bone 

scan.

 

Electronically signed by: Edilberto Wong DO (3/12/2019 12:40 AM) [id number]

## 2019-03-12 NOTE — PDOC
PROGRESS NOTES


Subjective


Subjective


"The diseases I have, have been caused by all the drugs you and everyone else 

have given me.  They are poison."





Objective


Objective


Vascular Surgery Follow Up:





I accompanied Dr. Leiva when he stepped into room to see the patient and 

discuss a surgical plan for revascularization.  He presented a staged surgical 

plan that would include beginning with bilateral femoral endarterectomies to be 

followed by iliac stenting in a hybrid OR suite to improve inflow.  This step 

would hopefully allow for stabilization of tissue loss to his feet while 

working to improve cellulitis to bilateral lower extremities.  Then, once 

cellulitis better, to proceed with fem-pop bypass for limb and tissue salvage.  

However, the recommendation was to transfer the patient to [facility] where hybrid 

operative suites are available.  





The patient became quite upset and speaking about things that did not make 

sense.  Telling me his diseases are because of drugs he's been given and that 

we all are making him sick and spreading poison throughout his body.  As he 

talked, his demeanor became more agitated and upset.  Therefore, I reviewed his 

options 1 more time and excused myself.  





I will update Dr. Leiva when he gets out of the OR tonight.  In the meantime, 

will await hematology consultation and recommendation as if patient were to 

proceed with surgical intervention, he will need antiplatelet therapy on Plavix 

as a minimum.  








Vital Signs








  Date Time  Temp Pulse Resp B/P (MAP) Pulse Ox O2 Delivery O2 Flow Rate FiO2


 


3/12/19 11:00 98.5 52 18 103/60 (74) 95 Room Air  





 98.5       














Intake and Output 


 


 3/12/19





 07:00


 


Intake Total 50 ml


 


Output Total 300 ml


 


Balance -250 ml


 


 


 


Intake IV Total 50 ml


 


Output Urine Total 300 ml


 


# Voids 1











Assessment


Assessment


Problems


Medical Problems:


(1) Cellulitis of lower extremity


Status: Acute  











Comment


Review of Relevant


I have reviewed the following items karen (where applicable) has been applied.


Labs





Laboratory Tests








Test


 3/11/19


18:38


 


White Blood Count


 8.6 x10^3/uL


(4.0-11.0)


 


Red Blood Count


 5.80 x10^6/uL


(4.30-5.70)


 


Hemoglobin


 17.9 g/dL


(13.0-17.5)


 


Hematocrit


 54.1 %


(39.0-53.0)


 


Mean Corpuscular Volume 93 fL () 


 


Mean Corpuscular Hemoglobin 31 pg (25-35) 


 


Mean Corpuscular Hemoglobin


Concent 33 g/dL


(31-37)


 


Red Cell Distribution Width


 16.7 %


(11.5-14.5)


 


Platelet Count


 189 x10^3/uL


(140-400)


 


Neutrophils (%) (Auto) 76 % (31-73) 


 


Lymphocytes (%) (Auto) 13 % (24-48) 


 


Monocytes (%) (Auto) 9 % (0-9) 


 


Eosinophils (%) (Auto) 2 % (0-3) 


 


Basophils (%) (Auto) 1 % (0-3) 


 


Neutrophils # (Auto)


 6.5 x10^3uL


(1.8-7.7)


 


Lymphocytes # (Auto)


 1.1 x10^3/uL


(1.0-4.8)


 


Monocytes # (Auto)


 0.8 x10^3/uL


(0.0-1.1)


 


Eosinophils # (Auto)


 0.2 x10^3/uL


(0.0-0.7)


 


Basophils # (Auto)


 0.0 x10^3/uL


(0.0-0.2)


 


Sodium Level


 142 mmol/L


(136-145)


 


Potassium Level


 4.3 mmol/L


(3.5-5.1)


 


Chloride Level


 104 mmol/L


()


 


Carbon Dioxide Level


 26 mmol/L


(21-32)


 


Anion Gap 12 (6-14) 


 


Blood Urea Nitrogen


 11 mg/dL


(8-26)


 


Creatinine


 0.9 mg/dL


(0.7-1.3)


 


Estimated GFR


(Cockcroft-Gault) 81.6 





 


BUN/Creatinine Ratio 12 (6-20) 


 


Glucose Level


 92 mg/dL


(70-99)


 


Lactic Acid Level


 1.6 mmol/L


(0.4-2.0)


 


Calcium Level


 9.1 mg/dL


(8.5-10.1)


 


Total Bilirubin


 0.7 mg/dL


(0.2-1.0)


 


Aspartate Amino Transf


(AST/SGOT) 12 U/L (15-37) 





 


Alanine Aminotransferase


(ALT/SGPT) 13 U/L (16-63) 





 


Alkaline Phosphatase


 111 U/L


()


 


Total Protein


 7.1 g/dL


(6.4-8.2)


 


Albumin


 3.5 g/dL


(3.4-5.0)


 


Albumin/Globulin Ratio 1.0 (1.0-1.7) 








Laboratory Tests








Test


 3/11/19


18:38


 


White Blood Count


 8.6 x10^3/uL


(4.0-11.0)


 


Red Blood Count


 5.80 x10^6/uL


(4.30-5.70)


 


Hemoglobin


 17.9 g/dL


(13.0-17.5)


 


Hematocrit


 54.1 %


(39.0-53.0)


 


Mean Corpuscular Volume 93 fL () 


 


Mean Corpuscular Hemoglobin 31 pg (25-35) 


 


Mean Corpuscular Hemoglobin


Concent 33 g/dL


(31-37)


 


Red Cell Distribution Width


 16.7 %


(11.5-14.5)


 


Platelet Count


 189 x10^3/uL


(140-400)


 


Neutrophils (%) (Auto) 76 % (31-73) 


 


Lymphocytes (%) (Auto) 13 % (24-48) 


 


Monocytes (%) (Auto) 9 % (0-9) 


 


Eosinophils (%) (Auto) 2 % (0-3) 


 


Basophils (%) (Auto) 1 % (0-3) 


 


Neutrophils # (Auto)


 6.5 x10^3uL


(1.8-7.7)


 


Lymphocytes # (Auto)


 1.1 x10^3/uL


(1.0-4.8)


 


Monocytes # (Auto)


 0.8 x10^3/uL


(0.0-1.1)


 


Eosinophils # (Auto)


 0.2 x10^3/uL


(0.0-0.7)


 


Basophils # (Auto)


 0.0 x10^3/uL


(0.0-0.2)


 


Sodium Level


 142 mmol/L


(136-145)


 


Potassium Level


 4.3 mmol/L


(3.5-5.1)


 


Chloride Level


 104 mmol/L


()


 


Carbon Dioxide Level


 26 mmol/L


(21-32)


 


Anion Gap 12 (6-14) 


 


Blood Urea Nitrogen


 11 mg/dL


(8-26)


 


Creatinine


 0.9 mg/dL


(0.7-1.3)


 


Estimated GFR


(Cockcroft-Gault) 81.6 





 


BUN/Creatinine Ratio 12 (6-20) 


 


Glucose Level


 92 mg/dL


(70-99)


 


Lactic Acid Level


 1.6 mmol/L


(0.4-2.0)


 


Calcium Level


 9.1 mg/dL


(8.5-10.1)


 


Total Bilirubin


 0.7 mg/dL


(0.2-1.0)


 


Aspartate Amino Transf


(AST/SGOT) 12 U/L (15-37) 





 


Alanine Aminotransferase


(ALT/SGPT) 13 U/L (16-63) 





 


Alkaline Phosphatase


 111 U/L


()


 


Total Protein


 7.1 g/dL


(6.4-8.2)


 


Albumin


 3.5 g/dL


(3.4-5.0)


 


Albumin/Globulin Ratio 1.0 (1.0-1.7) 








Medications





Current Medications


Fentanyl Citrate (Fentanyl 2ml Vial) 50 mcg 1X  ONCE IV  Last administered on 3/

11/19at 18:52;  Start 3/11/19 at 18:15;  Stop 3/11/19 at 18:16;  Status DC


Clindamycin Phosphate 50 ml @  100 mls/hr 1X  ONCE IV  Last administered on 3/11

/19at 20:21;  Start 3/11/19 at 20:00;  Stop 3/11/19 at 20:29;  Status DC


Ondansetron HCl (Zofran) 4 mg PRN Q8HRS  PRN IV NAUSEA/VOMITING;  Start 3/11/19 

at 20:00;  Stop 3/12/19 at 08:11;  Status DC


Fentanyl Citrate (Fentanyl 2ml Vial) 50 mcg PRN Q1HR  PRN IV PAIN Last 

administered on 3/12/19at 07:05;  Start 3/11/19 at 20:00;  Stop 3/12/19 at 19:59


Fentanyl Citrate (Fentanyl 2ml Vial) 50 mcg 1X  ONCE IV  Last administered on 3/

11/19at 20:21;  Start 3/11/19 at 20:15;  Stop 3/11/19 at 20:16;  Status DC


Ondansetron HCl (Zofran) 4 mg PRN Q6HRS  PRN IV NAUSEA/VOMITING;  Start 3/12/19 

at 08:15


Oxycodone/ Acetaminophen (Percocet 5/325) 1 tab PRN Q4HRS  PRN PO PAIN Last 

administered on 3/12/19at 08:43;  Start 3/12/19 at 08:15


Acetaminophen (Tylenol) 500 mg PRN Q6HRS  PRN PO HEADACHE / TEMP;  Start 3/12/

19 at 08:15


Clindamycin Phosphate 50 ml @  100 mls/hr Q8HRS IV ;  Start 3/12/19 at 09:00;  

Stop 3/12/19 at 09:21;  Status DC


Aspirin (Ecotrin) 81 mg DAILYWBKFT PO  Last administered on 3/12/19at 08:43;  

Start 3/12/19 at 09:00


Atorvastatin Calcium (Lipitor) 40 mg QHS PO ;  Start 3/12/19 at 21:00


Magnesium Hydroxide (Milk Of Magnesia) 400 mg PRN DAILY  PRN PO CONSTIPATION;  

Start 3/12/19 at 08:15


Oxycodone/ Acetaminophen (Percocet 5/325) 1 tab PRN Q6HRS  PRN PO PAIN;  Start 3

/12/19 at 08:15;  Status UNV


Linezolid (Zyvox) 600 mg BID PO  Last administered on 3/12/19at 10:07;  Start 3/

12/19 at 09:30


Meropenem 500 mg/ Sodium Chloride 50 ml @  100 mls/hr Q6HRS IV ;  Start 3/12/19 

at 12:00


Micafungin Sodium 100 mg/Dextrose 100 ml @  100 mls/hr Q24H IV  Last 

administered on 3/12/19at 10:07;  Start 3/12/19 at 10:00


Lactobacillus Rhamnosus (Culturelle) 1 cap BID PO ;  Start 3/12/19 at 21:00





Active Scripts


Active


Percocet 5-325 Mg Tablet ** (Oxycodone/Acetaminophen) 1 Each Tablet 1 Tab PO 

PRN Q6HRS PRN


Aspirin Ec (Aspirin) 81 Mg Tablet. 81 Mg PO DAILYWBKFT


Atorvastatin Calcium 40 Mg Tablet 40 Mg PO QHS


Reported


Aquino' Milk Of Magnesia (Magnesium Hydroxide) 400 Mg/5 Ml Oral.susp 400 Mg 

PO PRN DAILY PRN


Doxycycline Hyclate 100 Mg Tablet. 1 Tab PO BID 1 Days


Vitals/I & O





Vital Sign - Last 24 Hours








 3/11/19 3/11/19 3/11/19 3/11/19





 17:05 17:30 17:50 18:50


 


Temp 98.2 97.6  





 98.2 97.6  


 


Pulse 103 103 100 90


 


Resp 20 16 18 16


 


B/P (MAP) 121/66 (84) 142/65 (90) 169/76 (107) 130/68 (88)


 


Pulse Ox 98 97 96 96


 


O2 Delivery Room Air Room Air Room Air 


 


    





    





 3/11/19 3/11/19 3/11/19 3/11/19





 18:52 18:53 19:23 19:53


 


Pulse  94 88 106


 


Resp 16 16 16 18


 


B/P (MAP)  119/67 (84) 111/56 (74) 133/82 (99)


 


Pulse Ox 97 96 95 97





 3/11/19 3/11/19 3/11/19 3/11/19





 20:21 20:23 20:53 21:45


 


Temp    98.0





    98.0


 


Pulse  100 82 96


 


Resp 18 18 16 18


 


B/P (MAP)  122/65 (84) 112/56 (74) 125/60 (81)


 


Pulse Ox 97 97 96 94


 


O2 Delivery Room Air Room Air Room Air Room Air


 


    





    





 3/11/19 3/11/19 3/11/19 3/12/19





 21:50 23:00 23:35 01:47


 


Temp  98.2  





  98.2  


 


Pulse  92  


 


Resp 16 16 20 20


 


B/P (MAP)  120/62 (81)  


 


Pulse Ox  96  


 


O2 Delivery Room Air Room Air Room Air Room Air


 


    





    





 3/12/19 3/12/19 3/12/19 3/12/19





 03:00 04:34 05:02 07:00


 


Temp 98.1   98.2





 98.1   98.2


 


Pulse 79   97


 


Resp 16 24 20 18


 


B/P (MAP) 131/80 (97)   118/62 (80)


 


Pulse Ox 96   92


 


O2 Delivery Room Air Room Air  Room Air


 


    





    





 3/12/19 3/12/19 3/12/19 3/12/19





 07:05 07:34 07:37 07:37


 


Resp 24   


 


O2 Delivery Room Air Room Air Room Air Room Air





 3/12/19 3/12/19 3/12/19 





 08:43 10:07 11:00 


 


Temp   98.5 





   98.5 


 


Pulse   52 


 


Resp   18 


 


B/P (MAP)   103/60 (74) 


 


Pulse Ox   95 


 


O2 Delivery Room Air Room Air Room Air 














Intake and Output   


 


 3/11/19 3/11/19 3/12/19





 15:00 23:00 07:00


 


Intake Total  50 ml 


 


Output Total   300 ml


 


Balance  50 ml -300 ml

















KIESHA SALEH Mar 12, 2019 14:43

## 2019-03-12 NOTE — PDOC
Infectious Disease Note


Vital Sign


Vital Signs





Vital Signs








  Date Time  Temp Pulse Resp B/P (MAP) Pulse Ox O2 Delivery O2 Flow Rate FiO2


 


3/12/19 08:43      Room Air  


 


3/12/19 07:05   24     


 


3/12/19 07:00 98.2 97  118/62 (80) 92   





 98.2       











Labs


Lab





Laboratory Tests








Test


 3/11/19


18:38


 


White Blood Count


 8.6 x10^3/uL


(4.0-11.0)


 


Red Blood Count


 5.80 x10^6/uL


(4.30-5.70)


 


Hemoglobin


 17.9 g/dL


(13.0-17.5)


 


Hematocrit


 54.1 %


(39.0-53.0)


 


Mean Corpuscular Volume 93 fL () 


 


Mean Corpuscular Hemoglobin 31 pg (25-35) 


 


Mean Corpuscular Hemoglobin


Concent 33 g/dL


(31-37)


 


Red Cell Distribution Width


 16.7 %


(11.5-14.5)


 


Platelet Count


 189 x10^3/uL


(140-400)


 


Neutrophils (%) (Auto) 76 % (31-73) 


 


Lymphocytes (%) (Auto) 13 % (24-48) 


 


Monocytes (%) (Auto) 9 % (0-9) 


 


Eosinophils (%) (Auto) 2 % (0-3) 


 


Basophils (%) (Auto) 1 % (0-3) 


 


Neutrophils # (Auto)


 6.5 x10^3uL


(1.8-7.7)


 


Lymphocytes # (Auto)


 1.1 x10^3/uL


(1.0-4.8)


 


Monocytes # (Auto)


 0.8 x10^3/uL


(0.0-1.1)


 


Eosinophils # (Auto)


 0.2 x10^3/uL


(0.0-0.7)


 


Basophils # (Auto)


 0.0 x10^3/uL


(0.0-0.2)


 


Sodium Level


 142 mmol/L


(136-145)


 


Potassium Level


 4.3 mmol/L


(3.5-5.1)


 


Chloride Level


 104 mmol/L


()


 


Carbon Dioxide Level


 26 mmol/L


(21-32)


 


Anion Gap 12 (6-14) 


 


Blood Urea Nitrogen


 11 mg/dL


(8-26)


 


Creatinine


 0.9 mg/dL


(0.7-1.3)


 


Estimated GFR


(Cockcroft-Gault) 81.6 





 


BUN/Creatinine Ratio 12 (6-20) 


 


Glucose Level


 92 mg/dL


(70-99)


 


Lactic Acid Level


 1.6 mmol/L


(0.4-2.0)


 


Calcium Level


 9.1 mg/dL


(8.5-10.1)


 


Total Bilirubin


 0.7 mg/dL


(0.2-1.0)


 


Aspartate Amino Transf


(AST/SGOT) 12 U/L (15-37) 





 


Alanine Aminotransferase


(ALT/SGPT) 13 U/L (16-63) 





 


Alkaline Phosphatase


 111 U/L


()


 


Total Protein


 7.1 g/dL


(6.4-8.2)


 


Albumin


 3.5 g/dL


(3.4-5.0)


 


Albumin/Globulin Ratio 1.0 (1.0-1.7) 











Objective


Assessment


RLE cellulitis


R foot wound


PAD


Tobacco abuse


H/o MSSA/Enterobacter


Tinea





Plan


Plan of Care


micafungin/Meropenem/Zyvox


Needs vascular eval


D/c tobacco


F/u labs and cults


local wound care





Thank you





 # [phone]











RONNIE ARAUJO MD Mar 12, 2019 09:32

## 2019-03-12 NOTE — NUR
Wound Care note

Wound care consult for bilateral feet wounds. Pt of wound clinic, R anterior foot and L 
anterior foot pictured and measured, cleansed with wound wash, skin prep, xeroform and foam 
applied. BLE dry and flaky, NICOLE.  No other wounds noted at this time. Pt is self turn, does 
not want elevate his legs at this time d/t pain. Wound care will continue to follow up.

## 2019-03-13 VITALS — DIASTOLIC BLOOD PRESSURE: 53 MMHG | SYSTOLIC BLOOD PRESSURE: 114 MMHG

## 2019-03-13 VITALS — DIASTOLIC BLOOD PRESSURE: 52 MMHG | SYSTOLIC BLOOD PRESSURE: 124 MMHG

## 2019-03-13 VITALS — DIASTOLIC BLOOD PRESSURE: 41 MMHG | SYSTOLIC BLOOD PRESSURE: 130 MMHG

## 2019-03-13 VITALS — SYSTOLIC BLOOD PRESSURE: 105 MMHG | DIASTOLIC BLOOD PRESSURE: 50 MMHG

## 2019-03-13 VITALS — DIASTOLIC BLOOD PRESSURE: 42 MMHG | SYSTOLIC BLOOD PRESSURE: 101 MMHG

## 2019-03-13 VITALS — SYSTOLIC BLOOD PRESSURE: 123 MMHG | DIASTOLIC BLOOD PRESSURE: 57 MMHG

## 2019-03-13 RX ADMIN — LINEZOLID SCH MG: 600 TABLET, FILM COATED ORAL at 21:01

## 2019-03-13 RX ADMIN — ATORVASTATIN CALCIUM SCH MG: 40 TABLET, FILM COATED ORAL at 21:01

## 2019-03-13 RX ADMIN — OXYCODONE HYDROCHLORIDE AND ACETAMINOPHEN PRN TAB: 5; 325 TABLET ORAL at 08:45

## 2019-03-13 RX ADMIN — MEROPENEM SCH MLS/HR: 500 INJECTION, POWDER, FOR SOLUTION INTRAVENOUS at 17:33

## 2019-03-13 RX ADMIN — MEROPENEM SCH MLS/HR: 500 INJECTION, POWDER, FOR SOLUTION INTRAVENOUS at 12:14

## 2019-03-13 RX ADMIN — Medication SCH CAP: at 08:46

## 2019-03-13 RX ADMIN — HYDROXYUREA SCH MG: 500 CAPSULE ORAL at 10:47

## 2019-03-13 RX ADMIN — DEXTROSE SCH MLS/HR: 50 INJECTION, SOLUTION INTRAVENOUS at 10:38

## 2019-03-13 RX ADMIN — Medication SCH CAP: at 21:01

## 2019-03-13 RX ADMIN — ENOXAPARIN SODIUM SCH MG: 40 INJECTION SUBCUTANEOUS at 10:38

## 2019-03-13 RX ADMIN — MEROPENEM SCH MLS/HR: 500 INJECTION, POWDER, FOR SOLUTION INTRAVENOUS at 06:07

## 2019-03-13 RX ADMIN — LINEZOLID SCH MG: 600 TABLET, FILM COATED ORAL at 08:46

## 2019-03-13 RX ADMIN — OXYCODONE HYDROCHLORIDE AND ACETAMINOPHEN PRN TAB: 5; 325 TABLET ORAL at 17:33

## 2019-03-13 RX ADMIN — MEROPENEM SCH MLS/HR: 500 INJECTION, POWDER, FOR SOLUTION INTRAVENOUS at 00:16

## 2019-03-13 RX ADMIN — ASPIRIN SCH MG: 81 TABLET, COATED ORAL at 08:23

## 2019-03-13 NOTE — PDOC
Infectious Disease Note


Subjective


Subjective


pt is feeling ok





ROS


ROS


no n/v/d/sob





Vital Sign


Vital Signs





Vital Signs








  Date Time  Temp Pulse Resp B/P (MAP) Pulse Ox O2 Delivery O2 Flow Rate FiO2


 


3/13/19 08:45   16   Room Air  


 


3/13/19 07:31 98.3 80  124/52 (76) 95   





 98.3       











Physical Exam


PHYSICAL EXAM


heent nad


neck supple 


lungs clear


heart s1s2 rr


abd soft nt no organomegaly


ext , dry flaky skin, rt toe has wound, rt dorsal foot has wound with drainage


cns nad





Labs


Micro





Microbiology


3/11/19 Blood Culture - Preliminary, Resulted


          NO GROWTH AFTER 1 DAY





Objective


Assessment


RLE cellulitis


R foot wound


PAD


Tobacco abuse


H/o MSSA/Enterobacter


Tinea





Plan


Plan of Care


micafungin/Meropenem/Zyvox


Needs vascular eval


D/c tobacco


F/u labs and cults


local wound care











MIKA SEGOVIA MD Mar 13, 2019 09:29

## 2019-03-13 NOTE — PDOC2
CONSULT


Date of Consult


Date of Consult


DATE: 3/13/19 


TIME: 09:35


Reason for consultation: P vera


Consult: Hematology oncology, Dr. Earnest Vega





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 

Jak2 next generation sequencing has not been tested, he has not followed up for 

outpatient follow-up, he is angry and agitated and expresses displeasure with 

his health and medical staff, however we were asked to see him due to history 

of P vera and the possible need for revascularization surgery with possible 

transfer to . Regarding the P vera it is associated with erythrocytosis, 

chronic, worsen due to smoking likely, not associated with elevated white blood 

cells or platelets, and has improved with phlebotomy in the past, he was 

started on Hydrea after recent diagnosis as an inpatient but failed to show up 

for outpatient follow-up and was not on this prior to admission, he tells me I 

can order it.





Past medical history: COPD


Hyperlipidemia


Pulmonary embolism every 2018, not evident on December 2018 CT angiogram


Coronary artery disease with prior and STEMI


Nonhealing leg wounds


Cellulitis


Peripheral arterial disease


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


Bilateral iliofemoral occlusion


Tobacco abuse


Noncompliance





Past surgical history: Right femoropopliteal bypass


Hernia repair





Allergies: No known drug allergies





Medications: See attached list





Social history: Active smoking, no alcohol, lives alone





Review of systems: Chronic edema with dry skin and PVD changes, chronic 

nonhealing leg wounds, agitation, unable to get rest of review of systems due 

to his personality disorder





Physical exam: Vitals reviewed


Gen.: thin elderly irritable man in no acute distress


HEENT: mucous membranes dry, head normocephalic atraumatic 


Neck: Supple, no lymphadenopathy


Lymph nodes: No palpable lymphadenopathy neck or axilla 


Lungs: Breathing comfortably, no evidence of respiratory distress


Abdomen: Soft, nontender, nondistended


Extremities: No cyanosis, does have bilateral lower extremity swelling, chronic 

PVD changes, ulceration


Skin: Erythema and dry skin with chronic PVD changes bilateral lower extremities


Neuro: Alert and oriented, using a walker and needing assistance to go to 

bathroom


Psych: unpleasant mood, agitated affect





Lab reviewed: Hemoglobin 17.9, creatinine 0.9, hematocrit 54.1, blood cultures 

negative, normal white blood cells and platelets





Rads reviewed: Chest x-ray with mild by basilar linear atelectasis/scarring


Right lower extremity ultrasound with no DVT





Case discussed with: Patient, records reviewed in [facility] and [email or web address], including 

labs and radiology, please see note for summary details.





Assessment and Plan: He is a 78-year-old noncompliant angry man with history of 

PE, chronic peripheral vascular disease arterial and venous, chronic nonhealing 

leg wounds, need for revascularization due to bilateral iliofemoral occlusion, 

and history of Jak2 negative P vera. 





P vera: Recommend phlebotomy weekly for hematocrit greater than 45 (ordered 13 

Mar), will restart Hydrea 1000 mg daily, continue aspirin 81 mg daily





Need for revascularization surgery: may be transferred to , fine for any 

anticoagulation as needed from his other physicians





Cellulitis: On antibiotics





Tobacco abuse: Recommend smoking cessation





History of PE and P vera: Recommend DVT prophylaxis, we'll order Lovenox but 

certainly can be held if needed for possible surgery





Disposition: Per others, he can follow-up with Dr. Holland as an outpatient





Thank you kindly for this consultation, and please don't hesitate to call with 

any further questions.





Past Medical History


Cardiovascular:  Hyperlipidemia, Other (Peripheral vascular disease)


Pulmonary:  COPD, Pulmonary embolus, Other (Tobaccoism)


GI:  No pertinent hx


Heme/Onc:  Other (Polycythemia vera with thrombocytopenia)


Musculoskeletal:  Swelling (Severe bilateral lower leg)


ENT:  No pertinent hx


Renal/:  No pertinent hx


Dermatology:  Cellulitis, Other (Bilateral, chronic forefoot non-healing ulcers)





Past Surgical History


Past Surgical History:  Hernia Repair, Other (Right femoral to popliteal bypass 

2008)





Family History


Family History:  No Significant





Social History


<1 pack per day (States he "does not inhale.")


ALCOHOL:  none


Drugs:  None


Lives:  Alone





Current Problem List


Problem List


Problems


Medical Problems:


(1) Cellulitis of lower extremity


Status: Acute  











Current Medications


Current Medications





Current Medications


Fentanyl Citrate (Fentanyl 2ml Vial) 50 mcg 1X  ONCE IV  Last administered on 3/

11/19at 18:52;  Start 3/11/19 at 18:15;  Stop 3/11/19 at 18:16;  Status DC


Clindamycin Phosphate 50 ml @  100 mls/hr 1X  ONCE IV  Last administered on 3/11

/19at 20:21;  Start 3/11/19 at 20:00;  Stop 3/11/19 at 20:29;  Status DC


Ondansetron HCl (Zofran) 4 mg PRN Q8HRS  PRN IV NAUSEA/VOMITING;  Start 3/11/19 

at 20:00;  Stop 3/12/19 at 08:11;  Status DC


Fentanyl Citrate (Fentanyl 2ml Vial) 50 mcg PRN Q1HR  PRN IV PAIN Last 

administered on 3/12/19at 07:05;  Start 3/11/19 at 20:00;  Stop 3/12/19 at 19:59

;  Status DC


Fentanyl Citrate (Fentanyl 2ml Vial) 50 mcg 1X  ONCE IV  Last administered on 3/

11/19at 20:21;  Start 3/11/19 at 20:15;  Stop 3/11/19 at 20:16;  Status DC


Ondansetron HCl (Zofran) 4 mg PRN Q6HRS  PRN IV NAUSEA/VOMITING;  Start 3/12/19 

at 08:15


Oxycodone/ Acetaminophen (Percocet 5/325) 1 tab PRN Q4HRS  PRN PO PAIN Last 

administered on 3/13/19at 08:45;  Start 3/12/19 at 08:15


Acetaminophen (Tylenol) 500 mg PRN Q6HRS  PRN PO HEADACHE / TEMP;  Start 3/12/

19 at 08:15


Clindamycin Phosphate 50 ml @  100 mls/hr Q8HRS IV ;  Start 3/12/19 at 09:00;  

Stop 3/12/19 at 09:21;  Status DC


Aspirin (Ecotrin) 81 mg DAILYWBKFT PO  Last administered on 3/13/19at 08:23;  

Start 3/12/19 at 09:00


Atorvastatin Calcium (Lipitor) 40 mg QHS PO  Last administered on 3/12/19at 21:

57;  Start 3/12/19 at 21:00


Magnesium Hydroxide (Milk Of Magnesia) 400 mg PRN DAILY  PRN PO CONSTIPATION 

Last administered on 3/13/19at 08:45;  Start 3/12/19 at 08:15


Oxycodone/ Acetaminophen (Percocet 5/325) 1 tab PRN Q6HRS  PRN PO PAIN;  Start 3

/12/19 at 08:15;  Status UNV


Linezolid (Zyvox) 600 mg BID PO  Last administered on 3/13/19at 08:46;  Start 3/

12/19 at 09:30


Meropenem 500 mg/ Sodium Chloride 50 ml @  100 mls/hr Q6HRS IV  Last 

administered on 3/13/19at 06:07;  Start 3/12/19 at 12:00


Micafungin Sodium 100 mg/Dextrose 100 ml @  100 mls/hr Q24H IV  Last 

administered on 3/12/19at 10:07;  Start 3/12/19 at 10:00


Lactobacillus Rhamnosus (Culturelle) 1 cap BID PO  Last administered on 3/13/

19at 08:46;  Start 3/12/19 at 21:00





Active Scripts


Active


Percocet 5-325 Mg Tablet ** (Oxycodone/Acetaminophen) 1 Each Tablet 1 Tab PO 

PRN Q6HRS PRN


Aspirin Ec (Aspirin) 81 Mg Tablet. 81 Mg PO DAILYWBKFT


Atorvastatin Calcium 40 Mg Tablet 40 Mg PO QHS


Reported


Aquino' Milk Of Magnesia (Magnesium Hydroxide) 400 Mg/5 Ml Oral.susp 400 Mg 

PO PRN DAILY PRN


Doxycycline Hyclate 100 Mg Tablet. 1 Tab PO BID 1 Days





Allergies


Allergies:  


Coded Allergies:  


     No Known Drug Allergies (Unverified , 5/6/17)





Vitals


VITALS





Vital Signs








  Date Time  Temp Pulse Resp B/P (MAP) Pulse Ox O2 Delivery O2 Flow Rate FiO2


 


3/13/19 08:45   16   Room Air  


 


3/13/19 07:31 98.3 80  124/52 (76) 95   





 98.3       











Labs


Labs





Laboratory Tests








Test


 3/11/19


18:38


 


White Blood Count


 8.6 x10^3/uL


(4.0-11.0)


 


Red Blood Count


 5.80 x10^6/uL


(4.30-5.70)


 


Hemoglobin


 17.9 g/dL


(13.0-17.5)


 


Hematocrit


 54.1 %


(39.0-53.0)


 


Mean Corpuscular Volume 93 fL () 


 


Mean Corpuscular Hemoglobin 31 pg (25-35) 


 


Mean Corpuscular Hemoglobin


Concent 33 g/dL


(31-37)


 


Red Cell Distribution Width


 16.7 %


(11.5-14.5)


 


Platelet Count


 189 x10^3/uL


(140-400)


 


Neutrophils (%) (Auto) 76 % (31-73) 


 


Lymphocytes (%) (Auto) 13 % (24-48) 


 


Monocytes (%) (Auto) 9 % (0-9) 


 


Eosinophils (%) (Auto) 2 % (0-3) 


 


Basophils (%) (Auto) 1 % (0-3) 


 


Neutrophils # (Auto)


 6.5 x10^3uL


(1.8-7.7)


 


Lymphocytes # (Auto)


 1.1 x10^3/uL


(1.0-4.8)


 


Monocytes # (Auto)


 0.8 x10^3/uL


(0.0-1.1)


 


Eosinophils # (Auto)


 0.2 x10^3/uL


(0.0-0.7)


 


Basophils # (Auto)


 0.0 x10^3/uL


(0.0-0.2)


 


Sodium Level


 142 mmol/L


(136-145)


 


Potassium Level


 4.3 mmol/L


(3.5-5.1)


 


Chloride Level


 104 mmol/L


()


 


Carbon Dioxide Level


 26 mmol/L


(21-32)


 


Anion Gap 12 (6-14) 


 


Blood Urea Nitrogen


 11 mg/dL


(8-26)


 


Creatinine


 0.9 mg/dL


(0.7-1.3)


 


Estimated GFR


(Cockcroft-Gault) 81.6 





 


BUN/Creatinine Ratio 12 (6-20) 


 


Glucose Level


 92 mg/dL


(70-99)


 


Lactic Acid Level


 1.6 mmol/L


(0.4-2.0)


 


Calcium Level


 9.1 mg/dL


(8.5-10.1)


 


Total Bilirubin


 0.7 mg/dL


(0.2-1.0)


 


Aspartate Amino Transf


(AST/SGOT) 12 U/L (15-37) 





 


Alanine Aminotransferase


(ALT/SGPT) 13 U/L (16-63) 





 


Alkaline Phosphatase


 111 U/L


()


 


Total Protein


 7.1 g/dL


(6.4-8.2)


 


Albumin


 3.5 g/dL


(3.4-5.0)


 


Albumin/Globulin Ratio 1.0 (1.0-1.7) 

















EARNEST VEGA MD Mar 13, 2019 09:44

## 2019-03-13 NOTE — PDOC
PROGRESS NOTES


History of Present Illness


History of Present Illness





Assessment/Plan


Assessment/Plan


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  


New complete occlusion of the left iliofemoral arterial system  


Nonvisualization of the bilateral anterior tibial arteries be secondary


to a diffuse high-grade stenosis or complete occlusion.  


Enlarged prostate.  


Moderate atherosclerotic disease of the infrarenal aorta with 


    circumferential soft plaque.


bl leg cellulitis


PAD with bl iliafemeral A occlusion


hypotension, 


h/o dvt, PE


tobacco abuse


COPD


polythemia vera





PLAN:





ADMIT





2 midnights


Heme oncology //vascular, 


ID CONSULTED


home meds


Pain control 


iv micafungin/Meropenem/Zyvox


vascular consult


D/c tobacco, education provided


3/13 start Hydrea 1000 mg daily, // aspirin 81 mg daily


plan  transfer the patient to [facility] where hybrid operative suites are available.

,  if patient agrees





Vitals


Vitals





Vital Signs








  Date Time  Temp Pulse Resp B/P (MAP) Pulse Ox O2 Delivery O2 Flow Rate FiO2


 


3/13/19 11:15 98.0 97 18 123/57 (79) 94 Room Air  





 98.0       











Physical Exam


Physical Exam


heent nad


neck supple 


lungs clear


heart s1s2 rr


abd soft nt no organomegaly


ext , dry flaky skin, rt toe has wound, rt dorsal foot has wound with drainage


cns nad


General:  Alert, Cooperative, mild distress


Heart:  Regular rate (with occassional missed beats), Normal S1, Normal S2, No 

murmurs, Other (Palpable bilateral carotid, brachial and radial pulses.  Unable 

to palpate femoral, popliteal, dorsalis pedal or post tibial pulses.)


Lungs:  Clear


Abdomen:  Normal bowel sounds, Soft, No tenderness


Extremities:  Other (Improved lower extremity swelling with diuresis in ER. )


Skin:  Other (Well-healed right leg incision from previous bypass.  Right 

forefoot odorous, open ulcer with necrotic tissue to wound base.  Left forefoot 

skin discolored.  Bilateral lower extremity erythema with cellulitis and dry-

flaking skin.  Extreme pain to very light palpation of toes and forefoot.  

Cracked fissures noted to plantar side of 1st toe.  )





Labs


LABS


 One or more of the following individualized dose reduction techniques were


utilized for this examination:


1. Automated exposure control.


2. Adjustment of the mA and/or kV according to patient size.


3. Use of iterative reconstruction technique.


 


Indication:ulcer to left foot


 


TECHNIQUE: CT angiogram of the abdomen, pelvis and bilateral extrarenal of


with IV contrast with multiplanar MIP reformats.


 


COMPARISON: 5/6/2017


 


FINDINGS:


Heart is normal in size. No pericardial or pleural effusion. Mild diffuse 


atherosclerotic disease seen of the suprarenal aorta. The celiac axis, 


splenic artery, left gastric artery, common hepatic artery, SMA, bilateral


renal arteries are patent. Moderate atherosclerotic disease is seen of the


infrarenal aorta with circumferential soft plaque and mild narrowing of 


the aortic lumen. Stable complete occlusion of the right common iliac 


artery, external iliac artery. The right internal iliac artery supplied by


lumbar collaterals. Mild narrowing seen in the proximal left common iliac 


artery which demonstrates circumferential soft plaque. There is complete 


occlusion of the left external iliac artery which is new from previous 


exam. The left internal iliac arteries patent. The bilateral common 


femoral arteries are occluded. The right superficial femoral artery and 


popliteal arteries are occluded. The right profunda femoris artery is 


patent. The right anterior tibial artery is nonvisualized which may be 


secondary to diffuse high-grade stenosis or occlusion. The right peroneal 


artery is patent up to the level of hindfoot. The right posterior tibial 


artery is patent up to the level of plantar branches. The peroneal and 


posterior tibial arteries are supplied by collaterals from genicular  


arteries.


 


The left superficial femoral and popliteal artery is occluded. The left 


profunda femoris artery is patent and is supplied by gluteal collaterals. 


The anterior tibial artery is nonopacified likely secondary to diffuse 


high-grade stenosis or occlusion. The left peroneal arteries are 


visualized. The posterior tibial artery is patent up to the level of 


plantar branches.


 


Mild bibasilar subsegmental atelectasis or scarring seen in the subpleural


region.


 


The arterial phase appearance of the liver, spleen, gallbladder, pancreas,


adrenals and kidneys is within normal limits. No enlarged retroperitoneal 


or pelvic adenopathy. No free pelvic fluid or ascites. Small bilateral 


fat-containing inguinal hernias. Prostate is enlarged measuring 5.8 x 4.8 


cm. Urinary bladder within normal limits. No bowel obstruction. No 


enlarged retroperitoneal or pelvic adenopathy. No pneumoperitoneum. Small 


omental fat-containing medical hernia. Enlarged right groin lymph node 


measuring 2.7 x 1.0 cm and left groin lymph node measuring 1.8 x 1.1 cm. 


No suspicious bony lesion.


 


IMPRESSION:


1. 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 


described above.


2. New complete occlusion of the left iliofemoral arterial system as 


described above. 


3. Nonvisualization of the bilateral anterior tibial arteries be secondary


to a diffuse high-grade stenosis or complete occlusion. Please see above 


for details.


4. Enlarged prostate. Correlate with physical exam and PSA. 


5. Moderate atherosclerotic disease of the infrarenal aorta with 


circumferential soft plaque.


6. Mildly enlarged bilateral inguinal lymph nodes, nonspecific most likely


reactive.


 


Critical findings were identified on 12/23/2018 3:39 PM, read back and 


verified with Nurse Catina on 12/23/2018 3:58 PM by Dr. Edilberto Wong DO.


 


Electronically signed by: Edilberto Wong DO (12/23/2018 3:58 PM) [facility]











Left leg venous Doppler study:


 


Clinical indications: Left leg and edema. Greater saphenous vein has been 


surgically removed.


 


Findings: Duplex sonography (including gray scale evaluation and color 


flow and waveform spectral analysis) of the proximal aspect of the 


profunda femoral vein and the entire length of the common femoral and 


superficial femoral and popliteal veins and the tibioperoneal trunk and 


the proximal aspect of the posterior tibial and peroneal veins of the left


leg was performed. Normal compressibility, augmentation of color Doppler 


flow after calf compression, and respiratory variation of Doppler flow is 


seen. Thus, there are no sonographic findings of deep venous thrombosis 


within these veins.


 


Impression: There are no sonographic findings of deep venous thrombosis 


within the veins discussed above of the left lower extremity.


 


 


Right leg venous Doppler study:


 


Clinical indications: Right leg edema. Greater saphenous vein has been 


surgically removed.


 


Findings: Duplex sonography (including gray scale evaluation and color 


flow and waveform spectral analysis) of the proximal aspect of the 


profunda femoral vein and the entire length of the common femoral and 


superficial femoral and popliteal veins and the tibioperoneal trunk and 


the proximal aspect of the posterior tibial and peroneal veins of the 


right leg was performed. Normal compressibility, augmentation of color 


Doppler flow after calf compression, and respiratory variation of Doppler 


flow is seen. Thus, there are no sonographic findings of deep venous 


thrombosis within these veins.


 


Impression: There are no sonographic findings of deep venous thrombosis 


within the veins discussed above of the right lower extremity.


 


Electronically signed by: Kiera Sher MD (3/11/2019 10:49 PM) [facility]














DICTATED and SIGNED BY:     KIERA SHER MD





Assessment and Plan


Assessmemt and Plan


Problems


Medical Problems:


(1) Cellulitis of lower extremity


Status: Acute  











Comment


Review of Relevant


I have reviewed the following items karen (where applicable) has been applied.


Labs





Laboratory Tests








Test


 3/11/19


18:38


 


White Blood Count


 8.6 x10^3/uL


(4.0-11.0)


 


Red Blood Count


 5.80 x10^6/uL


(4.30-5.70)


 


Hemoglobin


 17.9 g/dL


(13.0-17.5)


 


Hematocrit


 54.1 %


(39.0-53.0)


 


Mean Corpuscular Volume 93 fL () 


 


Mean Corpuscular Hemoglobin 31 pg (25-35) 


 


Mean Corpuscular Hemoglobin


Concent 33 g/dL


(31-37)


 


Red Cell Distribution Width


 16.7 %


(11.5-14.5)


 


Platelet Count


 189 x10^3/uL


(140-400)


 


Neutrophils (%) (Auto) 76 % (31-73) 


 


Lymphocytes (%) (Auto) 13 % (24-48) 


 


Monocytes (%) (Auto) 9 % (0-9) 


 


Eosinophils (%) (Auto) 2 % (0-3) 


 


Basophils (%) (Auto) 1 % (0-3) 


 


Neutrophils # (Auto)


 6.5 x10^3uL


(1.8-7.7)


 


Lymphocytes # (Auto)


 1.1 x10^3/uL


(1.0-4.8)


 


Monocytes # (Auto)


 0.8 x10^3/uL


(0.0-1.1)


 


Eosinophils # (Auto)


 0.2 x10^3/uL


(0.0-0.7)


 


Basophils # (Auto)


 0.0 x10^3/uL


(0.0-0.2)


 


Sodium Level


 142 mmol/L


(136-145)


 


Potassium Level


 4.3 mmol/L


(3.5-5.1)


 


Chloride Level


 104 mmol/L


()


 


Carbon Dioxide Level


 26 mmol/L


(21-32)


 


Anion Gap 12 (6-14) 


 


Blood Urea Nitrogen


 11 mg/dL


(8-26)


 


Creatinine


 0.9 mg/dL


(0.7-1.3)


 


Estimated GFR


(Cockcroft-Gault) 81.6 





 


BUN/Creatinine Ratio 12 (6-20) 


 


Glucose Level


 92 mg/dL


(70-99)


 


Lactic Acid Level


 1.6 mmol/L


(0.4-2.0)


 


Calcium Level


 9.1 mg/dL


(8.5-10.1)


 


Total Bilirubin


 0.7 mg/dL


(0.2-1.0)


 


Aspartate Amino Transf


(AST/SGOT) 12 U/L (15-37) 





 


Alanine Aminotransferase


(ALT/SGPT) 13 U/L (16-63) 





 


Alkaline Phosphatase


 111 U/L


()


 


Total Protein


 7.1 g/dL


(6.4-8.2)


 


Albumin


 3.5 g/dL


(3.4-5.0)


 


Albumin/Globulin Ratio 1.0 (1.0-1.7) 








Microbiology


3/11/19 Blood Culture - Preliminary, Resulted


          NO GROWTH AFTER 1 DAY


Medications





Current Medications


Fentanyl Citrate (Fentanyl 2ml Vial) 50 mcg 1X  ONCE IV  Last administered on 3/

11/19at 18:52;  Start 3/11/19 at 18:15;  Stop 3/11/19 at 18:16;  Status DC


Clindamycin Phosphate 50 ml @  100 mls/hr 1X  ONCE IV  Last administered on 3/11

/19at 20:21;  Start 3/11/19 at 20:00;  Stop 3/11/19 at 20:29;  Status DC


Ondansetron HCl (Zofran) 4 mg PRN Q8HRS  PRN IV NAUSEA/VOMITING;  Start 3/11/19 

at 20:00;  Stop 3/12/19 at 08:11;  Status DC


Fentanyl Citrate (Fentanyl 2ml Vial) 50 mcg PRN Q1HR  PRN IV PAIN Last 

administered on 3/12/19at 07:05;  Start 3/11/19 at 20:00;  Stop 3/12/19 at 19:59

;  Status DC


Fentanyl Citrate (Fentanyl 2ml Vial) 50 mcg 1X  ONCE IV  Last administered on 3/

11/19at 20:21;  Start 3/11/19 at 20:15;  Stop 3/11/19 at 20:16;  Status DC


Ondansetron HCl (Zofran) 4 mg PRN Q6HRS  PRN IV NAUSEA/VOMITING;  Start 3/12/19 

at 08:15


Oxycodone/ Acetaminophen (Percocet 5/325) 1 tab PRN Q4HRS  PRN PO PAIN Last 

administered on 3/13/19at 08:45;  Start 3/12/19 at 08:15


Acetaminophen (Tylenol) 500 mg PRN Q6HRS  PRN PO HEADACHE / TEMP;  Start 3/12/

19 at 08:15


Clindamycin Phosphate 50 ml @  100 mls/hr Q8HRS IV ;  Start 3/12/19 at 09:00;  

Stop 3/12/19 at 09:21;  Status DC


Aspirin (Ecotrin) 81 mg DAILYWBKFT PO  Last administered on 3/13/19at 08:23;  

Start 3/12/19 at 09:00


Atorvastatin Calcium (Lipitor) 40 mg QHS PO  Last administered on 3/12/19at 21:

57;  Start 3/12/19 at 21:00


Magnesium Hydroxide (Milk Of Magnesia) 400 mg PRN DAILY  PRN PO CONSTIPATION 

Last administered on 3/13/19at 08:45;  Start 3/12/19 at 08:15


Oxycodone/ Acetaminophen (Percocet 5/325) 1 tab PRN Q6HRS  PRN PO PAIN;  Start 3

/12/19 at 08:15;  Status UNV


Linezolid (Zyvox) 600 mg BID PO  Last administered on 3/13/19at 08:46;  Start 3/

12/19 at 09:30


Meropenem 500 mg/ Sodium Chloride 50 ml @  100 mls/hr Q6HRS IV  Last 

administered on 3/13/19at 12:14;  Start 3/12/19 at 12:00


Micafungin Sodium 100 mg/Dextrose 100 ml @  100 mls/hr Q24H IV  Last 

administered on 3/13/19at 10:38;  Start 3/12/19 at 10:00


Lactobacillus Rhamnosus (Culturelle) 1 cap BID PO  Last administered on 3/13/

19at 08:46;  Start 3/12/19 at 21:00


Hydroxyurea (Hydrea) 1,000 mg DAILY PO  Last administered on 3/13/19at 10:47;  

Start 3/13/19 at 10:00


Enoxaparin Sodium (Lovenox 40mg Syringe) 40 mg Q24H SQ  Last administered on 3/

13/19at 10:38;  Start 3/13/19 at 10:00





Active Scripts


Active


Percocet 5-325 Mg Tablet ** (Oxycodone/Acetaminophen) 1 Each Tablet 1 Tab PO 

PRN Q6HRS PRN


Aspirin Ec (Aspirin) 81 Mg Tablet. 81 Mg PO DAILYWBKFT


Atorvastatin Calcium 40 Mg Tablet 40 Mg PO QHS


Reported


Aquino' Milk Of Magnesia (Magnesium Hydroxide) 400 Mg/5 Ml Oral.susp 400 Mg 

PO PRN DAILY PRN


Doxycycline Hyclate 100 Mg Tablet. 1 Tab PO BID 1 Days


Vitals/I & O





Vital Sign - Last 24 Hours








 3/12/19 3/12/19 3/12/19 3/12/19





 15:00 15:42 19:00 20:00


 


Temp 98.1  98.3 





 98.1  98.3 


 


Pulse 78  89 


 


Resp 18  18 


 


B/P (MAP) 111/47 (68)  109/58 (75) 


 


Pulse Ox 93  95 


 


O2 Delivery Room Air Room Air Room Air Room Air


 


    





    





 3/12/19 3/12/19 3/13/19 3/13/19





 21:56 23:00 03:00 07:31


 


Temp  98.6 98.3 98.3





  98.6 98.3 98.3


 


Pulse  83 83 80


 


Resp 20 18 18 20


 


B/P (MAP)  104/58 (73) 105/50 (68) 124/52 (76)


 


Pulse Ox  95 92 95


 


O2 Delivery Room Air Room Air Room Air Room Air


 


    





    





 3/13/19 3/13/19 3/13/19 3/13/19





 08:00 08:45 09:45 11:15


 


Temp    98.0





    98.0


 


Pulse    97


 


Resp  16 16 18


 


B/P (MAP)    123/57 (79)


 


Pulse Ox    94


 


O2 Delivery Room Air Room Air Room Air Room Air














Intake and Output   


 


 3/12/19 3/12/19 3/13/19





 15:00 23:00 07:00


 


Intake Total   900 ml


 


Output Total  250 ml 625 ml


 


Balance  -250 ml 275 ml

















SHIRLEY DEMARCO MD Mar 13, 2019 12:26

## 2019-03-13 NOTE — NUR
SW following for discharge planning. Discussed with RN, pt is from home. RN spoke of 
possible [facility] transfer. Chart reviewed, pt deciding on what to do. RN advised no SW needs at 
this time. SW will continue to follow.

## 2019-03-13 NOTE — PDOC
PROGRESS NOTES


Subjective


Subjective


Pt was seen and examined in his room again this afternoon with KT Daley





We had a very long discussion with him regarding his significant multi-level 

vascular disease and his need for revascularization to heal the wounds on his 

feet.


I was very clear with him that he is will end up with major amputation without 

vascular surgery.





He is convinced that there is a conspiracy to get him into an experimental 

treatment / study.  He believes there is a toxin in his legs causing his wounds 

and that we need to address this.


He told me on multiple occasions that "something nefarious is going on".  I was 

clear with him that I was not here or involved in his previous care and that I 

am simply trying to do what I believe is best for him now and save his leg from 

amputation.





he is also very upset about being admitted in the past and not having had 

surgery then -- this was a long complicated process and he has also not shown 

up for follow up appointments.





Again, I recommended transfer to  where I have more support and tools to do 

his surgery (hybrid operating room, ICU care, [place] catheter, etc)


He still is not convinced and says he will think about it and decide tomorrow.


He is not septic so we can wait a little bit of time but he really should have 

revascularization as soon as possible given his large foot wound. 





Again, Ms Von and I spent over an hour today reviewing his films and 

attempting to discuss this / coordinate care.


We also discussed the case with his hospitalist.





Objective


Objective





Vital Signs








  Date Time  Temp Pulse Resp B/P (MAP) Pulse Ox O2 Delivery O2 Flow Rate FiO2


 


3/13/19 11:15 98.0 97 18 123/57 (79) 94 Room Air  





 98.0       














Intake and Output 


 


 3/13/19





 06:59


 


Intake Total 900 ml


 


Output Total 875 ml


 


Balance 25 ml


 


 


 


Intake Oral 900 ml


 


Output Urine Total 875 ml


 


# Voids 2











Assessment


Assessment


Problems


Medical Problems:


(1) Cellulitis of lower extremity


Status: Acute  











Plan


Plan of Care


will re-visit with him tomorrow.


Would plan on transfer to  for hematology Rx and revascularization (likely 

staged)





Comment


Review of Relevant


I have reviewed the following items karen (where applicable) has been applied.


Labs





Laboratory Tests








Test


 3/11/19


18:38


 


White Blood Count


 8.6 x10^3/uL


(4.0-11.0)


 


Red Blood Count


 5.80 x10^6/uL


(4.30-5.70)


 


Hemoglobin


 17.9 g/dL


(13.0-17.5)


 


Hematocrit


 54.1 %


(39.0-53.0)


 


Mean Corpuscular Volume 93 fL () 


 


Mean Corpuscular Hemoglobin 31 pg (25-35) 


 


Mean Corpuscular Hemoglobin


Concent 33 g/dL


(31-37)


 


Red Cell Distribution Width


 16.7 %


(11.5-14.5)


 


Platelet Count


 189 x10^3/uL


(140-400)


 


Neutrophils (%) (Auto) 76 % (31-73) 


 


Lymphocytes (%) (Auto) 13 % (24-48) 


 


Monocytes (%) (Auto) 9 % (0-9) 


 


Eosinophils (%) (Auto) 2 % (0-3) 


 


Basophils (%) (Auto) 1 % (0-3) 


 


Neutrophils # (Auto)


 6.5 x10^3uL


(1.8-7.7)


 


Lymphocytes # (Auto)


 1.1 x10^3/uL


(1.0-4.8)


 


Monocytes # (Auto)


 0.8 x10^3/uL


(0.0-1.1)


 


Eosinophils # (Auto)


 0.2 x10^3/uL


(0.0-0.7)


 


Basophils # (Auto)


 0.0 x10^3/uL


(0.0-0.2)


 


Sodium Level


 142 mmol/L


(136-145)


 


Potassium Level


 4.3 mmol/L


(3.5-5.1)


 


Chloride Level


 104 mmol/L


()


 


Carbon Dioxide Level


 26 mmol/L


(21-32)


 


Anion Gap 12 (6-14) 


 


Blood Urea Nitrogen


 11 mg/dL


(8-26)


 


Creatinine


 0.9 mg/dL


(0.7-1.3)


 


Estimated GFR


(Cockcroft-Gault) 81.6 





 


BUN/Creatinine Ratio 12 (6-20) 


 


Glucose Level


 92 mg/dL


(70-99)


 


Lactic Acid Level


 1.6 mmol/L


(0.4-2.0)


 


Calcium Level


 9.1 mg/dL


(8.5-10.1)


 


Total Bilirubin


 0.7 mg/dL


(0.2-1.0)


 


Aspartate Amino Transf


(AST/SGOT) 12 U/L (15-37) 





 


Alanine Aminotransferase


(ALT/SGPT) 13 U/L (16-63) 





 


Alkaline Phosphatase


 111 U/L


()


 


Total Protein


 7.1 g/dL


(6.4-8.2)


 


Albumin


 3.5 g/dL


(3.4-5.0)


 


Albumin/Globulin Ratio 1.0 (1.0-1.7) 








Microbiology


3/11/19 Blood Culture - Preliminary, Resulted


          NO GROWTH AFTER 1 DAY


Medications





Current Medications


Fentanyl Citrate (Fentanyl 2ml Vial) 50 mcg 1X  ONCE IV  Last administered on 3/

11/19at 18:52;  Start 3/11/19 at 18:15;  Stop 3/11/19 at 18:16;  Status DC


Clindamycin Phosphate 50 ml @  100 mls/hr 1X  ONCE IV  Last administered on 3/11

/19at 20:21;  Start 3/11/19 at 20:00;  Stop 3/11/19 at 20:29;  Status DC


Ondansetron HCl (Zofran) 4 mg PRN Q8HRS  PRN IV NAUSEA/VOMITING;  Start 3/11/19 

at 20:00;  Stop 3/12/19 at 08:11;  Status DC


Fentanyl Citrate (Fentanyl 2ml Vial) 50 mcg PRN Q1HR  PRN IV PAIN Last 

administered on 3/12/19at 07:05;  Start 3/11/19 at 20:00;  Stop 3/12/19 at 19:59

;  Status DC


Fentanyl Citrate (Fentanyl 2ml Vial) 50 mcg 1X  ONCE IV  Last administered on 3/

11/19at 20:21;  Start 3/11/19 at 20:15;  Stop 3/11/19 at 20:16;  Status DC


Ondansetron HCl (Zofran) 4 mg PRN Q6HRS  PRN IV NAUSEA/VOMITING;  Start 3/12/19 

at 08:15


Oxycodone/ Acetaminophen (Percocet 5/325) 1 tab PRN Q4HRS  PRN PO PAIN Last 

administered on 3/13/19at 08:45;  Start 3/12/19 at 08:15


Acetaminophen (Tylenol) 500 mg PRN Q6HRS  PRN PO HEADACHE / TEMP;  Start 3/12/

19 at 08:15


Clindamycin Phosphate 50 ml @  100 mls/hr Q8HRS IV ;  Start 3/12/19 at 09:00;  

Stop 3/12/19 at 09:21;  Status DC


Aspirin (Ecotrin) 81 mg DAILYWBKFT PO  Last administered on 3/13/19at 08:23;  

Start 3/12/19 at 09:00


Atorvastatin Calcium (Lipitor) 40 mg QHS PO  Last administered on 3/12/19at 21:

57;  Start 3/12/19 at 21:00


Magnesium Hydroxide (Milk Of Magnesia) 400 mg PRN DAILY  PRN PO CONSTIPATION 

Last administered on 3/13/19at 08:45;  Start 3/12/19 at 08:15


Oxycodone/ Acetaminophen (Percocet 5/325) 1 tab PRN Q6HRS  PRN PO PAIN;  Start 3

/12/19 at 08:15;  Status UNV


Linezolid (Zyvox) 600 mg BID PO  Last administered on 3/13/19at 08:46;  Start 3/

12/19 at 09:30


Meropenem 500 mg/ Sodium Chloride 50 ml @  100 mls/hr Q6HRS IV  Last 

administered on 3/13/19at 12:14;  Start 3/12/19 at 12:00


Micafungin Sodium 100 mg/Dextrose 100 ml @  100 mls/hr Q24H IV  Last 

administered on 3/13/19at 10:38;  Start 3/12/19 at 10:00


Lactobacillus Rhamnosus (Culturelle) 1 cap BID PO  Last administered on 3/13/

19at 08:46;  Start 3/12/19 at 21:00


Hydroxyurea (Hydrea) 1,000 mg DAILY PO  Last administered on 3/13/19at 10:47;  

Start 3/13/19 at 10:00


Enoxaparin Sodium (Lovenox 40mg Syringe) 40 mg Q24H SQ  Last administered on 3/

13/19at 10:38;  Start 3/13/19 at 10:00





Active Scripts


Active


Percocet 5-325 Mg Tablet ** (Oxycodone/Acetaminophen) 1 Each Tablet 1 Tab PO 

PRN Q6HRS PRN


Aspirin Ec (Aspirin) 81 Mg Tablet. 81 Mg PO DAILYWBKFT


Atorvastatin Calcium 40 Mg Tablet 40 Mg PO QHS


Reported


Aquino' Milk Of Magnesia (Magnesium Hydroxide) 400 Mg/5 Ml Oral.susp 400 Mg 

PO PRN DAILY PRN


Doxycycline Hyclate 100 Mg Tablet. 1 Tab PO BID 1 Days


Vitals/I & O





Vital Sign - Last 24 Hours








 3/12/19 3/12/19 3/12/19 3/12/19





 15:00 15:42 19:00 20:00


 


Temp 98.1  98.3 





 98.1  98.3 


 


Pulse 78  89 


 


Resp 18  18 


 


B/P (MAP) 111/47 (68)  109/58 (75) 


 


Pulse Ox 93  95 


 


O2 Delivery Room Air Room Air Room Air Room Air


 


    





    





 3/12/19 3/12/19 3/13/19 3/13/19





 21:56 23:00 03:00 07:31


 


Temp  98.6 98.3 98.3





  98.6 98.3 98.3


 


Pulse  83 83 80


 


Resp 20 18 18 20


 


B/P (MAP)  104/58 (73) 105/50 (68) 124/52 (76)


 


Pulse Ox  95 92 95


 


O2 Delivery Room Air Room Air Room Air Room Air


 


    





    





 3/13/19 3/13/19 3/13/19 3/13/19





 08:00 08:45 09:45 11:15


 


Temp    98.0





    98.0


 


Pulse    97


 


Resp  16 16 18


 


B/P (MAP)    123/57 (79)


 


Pulse Ox    94


 


O2 Delivery Room Air Room Air Room Air Room Air














Intake and Output   


 


 3/12/19 3/12/19 3/13/19





 14:59 22:59 06:59


 


Intake Total   900 ml


 


Output Total  250 ml 625 ml


 


Balance  -250 ml 275 ml

















YENY ALONSO MD Mar 13, 2019 13:35

## 2019-03-14 VITALS — SYSTOLIC BLOOD PRESSURE: 106 MMHG | DIASTOLIC BLOOD PRESSURE: 55 MMHG

## 2019-03-14 VITALS — SYSTOLIC BLOOD PRESSURE: 99 MMHG | DIASTOLIC BLOOD PRESSURE: 52 MMHG

## 2019-03-14 VITALS — SYSTOLIC BLOOD PRESSURE: 114 MMHG | DIASTOLIC BLOOD PRESSURE: 59 MMHG

## 2019-03-14 VITALS — DIASTOLIC BLOOD PRESSURE: 61 MMHG | SYSTOLIC BLOOD PRESSURE: 122 MMHG

## 2019-03-14 LAB
ALBUMIN SERPL-MCNC: 2.6 G/DL (ref 3.4–5)
ALBUMIN/GLOB SERPL: 0.7 {RATIO} (ref 1–1.7)
ALP SERPL-CCNC: 80 U/L (ref 46–116)
ALT SERPL-CCNC: 9 U/L (ref 16–63)
ANION GAP SERPL CALC-SCNC: 11 MMOL/L (ref 6–14)
AST SERPL-CCNC: 19 U/L (ref 15–37)
BASOPHILS # BLD AUTO: 0 X10^3/UL (ref 0–0.2)
BASOPHILS NFR BLD: 0 % (ref 0–3)
BILIRUB SERPL-MCNC: 0.9 MG/DL (ref 0.2–1)
BUN SERPL-MCNC: 12 MG/DL (ref 8–26)
BUN/CREAT SERPL: 15 (ref 6–20)
CALCIUM SERPL-MCNC: 8.5 MG/DL (ref 8.5–10.1)
CHLORIDE SERPL-SCNC: 104 MMOL/L (ref 98–107)
CO2 SERPL-SCNC: 26 MMOL/L (ref 21–32)
CREAT SERPL-MCNC: 0.8 MG/DL (ref 0.7–1.3)
EOSINOPHIL NFR BLD: 0.2 X10^3/UL (ref 0–0.7)
EOSINOPHIL NFR BLD: 4 % (ref 0–3)
ERYTHROCYTE [DISTWIDTH] IN BLOOD BY AUTOMATED COUNT: 16.4 % (ref 11.5–14.5)
GFR SERPLBLD BASED ON 1.73 SQ M-ARVRAT: 93.5 ML/MIN
GLOBULIN SER-MCNC: 4 G/DL (ref 2.2–3.8)
GLUCOSE SERPL-MCNC: 85 MG/DL (ref 70–99)
HCT VFR BLD CALC: 50.4 % (ref 39–53)
HGB BLD-MCNC: 17 G/DL (ref 13–17.5)
LYMPHOCYTES # BLD: 1.2 X10^3/UL (ref 1–4.8)
LYMPHOCYTES NFR BLD AUTO: 19 % (ref 24–48)
MCH RBC QN AUTO: 31 PG (ref 25–35)
MCHC RBC AUTO-ENTMCNC: 34 G/DL (ref 31–37)
MCV RBC AUTO: 93 FL (ref 79–100)
MONO #: 0.8 X10^3/UL (ref 0–1.1)
MONOCYTES NFR BLD: 13 % (ref 0–9)
NEUT #: 4.2 X10^3UL (ref 1.8–7.7)
NEUTROPHILS NFR BLD AUTO: 64 % (ref 31–73)
PLATELET # BLD AUTO: 161 X10^3/UL (ref 140–400)
POTASSIUM SERPL-SCNC: 3.6 MMOL/L (ref 3.5–5.1)
PROT SERPL-MCNC: 6.6 G/DL (ref 6.4–8.2)
RBC # BLD AUTO: 5.42 X10^6/UL (ref 4.3–5.7)
SODIUM SERPL-SCNC: 141 MMOL/L (ref 136–145)
WBC # BLD AUTO: 6.5 X10^3/UL (ref 4–11)

## 2019-03-14 RX ADMIN — Medication SCH CAP: at 20:06

## 2019-03-14 RX ADMIN — MEROPENEM SCH MLS/HR: 500 INJECTION, POWDER, FOR SOLUTION INTRAVENOUS at 00:22

## 2019-03-14 RX ADMIN — OXYCODONE HYDROCHLORIDE AND ACETAMINOPHEN PRN TAB: 5; 325 TABLET ORAL at 11:12

## 2019-03-14 RX ADMIN — OXYCODONE HYDROCHLORIDE AND ACETAMINOPHEN PRN TAB: 5; 325 TABLET ORAL at 05:21

## 2019-03-14 RX ADMIN — LINEZOLID SCH MG: 600 TABLET, FILM COATED ORAL at 20:06

## 2019-03-14 RX ADMIN — Medication SCH CAP: at 09:05

## 2019-03-14 RX ADMIN — OXYCODONE HYDROCHLORIDE AND ACETAMINOPHEN PRN TAB: 5; 325 TABLET ORAL at 00:23

## 2019-03-14 RX ADMIN — ATORVASTATIN CALCIUM SCH MG: 40 TABLET, FILM COATED ORAL at 20:06

## 2019-03-14 RX ADMIN — MEROPENEM SCH MLS/HR: 500 INJECTION, POWDER, FOR SOLUTION INTRAVENOUS at 05:19

## 2019-03-14 RX ADMIN — LINEZOLID SCH MG: 600 TABLET, FILM COATED ORAL at 09:05

## 2019-03-14 RX ADMIN — HYDROXYUREA SCH MG: 500 CAPSULE ORAL at 09:32

## 2019-03-14 RX ADMIN — OXYCODONE HYDROCHLORIDE AND ACETAMINOPHEN PRN TAB: 5; 325 TABLET ORAL at 20:02

## 2019-03-14 RX ADMIN — ENOXAPARIN SODIUM SCH MG: 40 INJECTION SUBCUTANEOUS at 11:12

## 2019-03-14 RX ADMIN — ASPIRIN SCH MG: 81 TABLET, COATED ORAL at 07:54

## 2019-03-14 NOTE — SNU/HH DC
DISCHARGE ORDERS


DISCHARGE INFORMATION:


DISCHARGE DATE:  Mar 14, 2019


FINAL DIAGNOSIS


Problems


Medical Problems:


(1) Cellulitis of lower extremity


Status: Acute  








CONDITION ON DISCHARGE:  Stable





CODE STATUS:


Code Status:  Full





SKILLED NURSING:


SNF STAY <30 DAYS:  No





HOSPICE:


HOSPICE:  No


HOSPICE EVAL & TREAT:  No





LTAC:


ADMIT TO LTAC:  No





POST DISCHARGE ORDERS:


ACTIVITY ORDERS:  Activity as tolerated


DIET AFTER DISCHARGE:  Cardiac


WOUND/INCISION CARE:  Change dressing





CHECKS AFTER DISCHARGE:


CHECKS AFTER DISCHARGE:  Check blood press - daily, Check blood sugar, ac/hs





TREATMENT/EQUIPMENT ORDERS:


ADAPTIVE EQUIPMENT NEEDED:  None


Physical Therapy For:  Evalulation/Treatment


Occupational Therapy For:  Evaluation/Treatment





DISCHARGE MEDICATIONS:


Home Meds


Active Scripts


Hydroxyurea (HYDREA) 500 Mg Capsule, 1000 MG PO DAILY for p vera MDD 1 for 30 

Days, #60 CAP


   Prov:GONZALO MEDLEY MD         3/14/19


Linezolid (ZYVOX) 600 Mg Tablet, 600 MG PO BID for wound, leg MDD 1 for 10 Days

, #20 TAB


   Prov:GONZALO MEDLEY MD         3/14/19


Ciprofloxacin Hcl (CIPRO) 250 Mg Tablet, 500 MG PO BID for wound, legs MDD 1 

for 10 Days, #40 TAB


   Prov:GONZALO MEDLEY MD         3/14/19


Oxycodone/Apap 5-325 (PERCOCET 5-325 MG TABLET **) 1 Each Tablet, 1 TAB PO PRN 

Q6HRS PRN for PAIN, #30 TAB 0 Refills


   Prov:KATIE BECKHAM MD         1/23/19


Aspirin (ASPIRIN EC) 81 Mg Tablet.dr, 81 MG PO DAILYWBKFT for clot prevention, #

100 TAB.SR


   Prov:KATIE BECKHAM MD         12/28/18


Atorvastatin Calcium (ATORVASTATIN CALCIUM) 40 Mg Tablet, 40 MG PO QHS for 

cholesterol, #30 TAB


   Prov:KATIE BECKHAM MD         12/28/18


Reported Medications


Magnesium Hydroxide (YANCEY' MILK OF MAGNESIA) 400 Mg/5 Ml Oral.susp, 400 MG 

PO PRN DAILY PRN for CONSTIPATION, MISC


   3/11/19


Doxycycline Hyclate (DOXYCYCLINE HYCLATE) 100 Mg Tablet.dr, 1 TAB PO BID for 

cellulitis for 1 Day, #2 TAB


   3/11/19











GONZALO MEDLEY MD Mar 14, 2019 10:32

## 2019-03-14 NOTE — PDOC
PROGRESS NOTES


Subjective


Subjective


Patient seen and examined in room. 


Discussed plan of care with nurse prior to visit.





Again had a very long discussion with patient regarding his significant multi-

level vascular disease and his need for revascularization to heal the wounds on 

his feet.


I was very clear with him that he is will end up with major amputation without 

vascular surgery.





He believes there is a toxin in his legs causing his wounds and that we need to 

address this.





Again, we recommended transfer to  where there is more support and tools to 

do his surgery (hybrid operating room, ICU care, [place] catheter, etc)


He was to decide by this am if he was willing to transfer, he stated "Let's put 

a time limit on this". He still has not decided, again discussed the importance 

of limb salvage with patient. He will again try to discuss with family and 

decide today. 


He is not septic so we can wait a little bit of time but he really should have 

revascularization as soon as possible given his large foot wound. 





I spent over an hour today reviewing his films and attempting to discuss this / 

coordinate care.


I did discuss care with the Nurse and asked that if he transfers to request 

assistance from the Hospitalist.





Objective


Objective





Vital Signs








  Date Time  Temp Pulse Resp B/P (MAP) Pulse Ox O2 Delivery O2 Flow Rate FiO2


 


3/14/19 11:12   16   Room Air  


 


3/14/19 07:00  61  122/61 (81) 92   


 


3/14/19 03:00 97.9       





 97.9       














Intake and Output 


 


 3/14/19





 06:59


 


Intake Total 1040 ml


 


Output Total 1250 ml


 


Balance -210 ml


 


 


 


Intake Oral 1040 ml


 


Output Urine Total 1250 ml











Physical Exam


General:  Alert


Lungs:  Normal air movement


Skin:  Other (BLE swelling, scaly skin and erythema, necrotic wounds dorsum 

bilateral feet R>L, unable to appreciate distal pulses)





Assessment


Assessment


Problems


Medical Problems:


(1) Cellulitis of lower extremity


Status: Acute  


Sever atherosclerosis with ulceration


Recommend transfer to [facility] for revascularization awaiting 

patient's decision. 








Comment


Review of Relevant


I have reviewed the following items karen (where applicable) has been applied.


Labs





Laboratory Tests








Test


 3/14/19


04:10


 


White Blood Count


 6.5 x10^3/uL


(4.0-11.0)


 


Red Blood Count


 5.42 x10^6/uL


(4.30-5.70)


 


Hemoglobin


 17.0 g/dL


(13.0-17.5)


 


Hematocrit


 50.4 %


(39.0-53.0)


 


Mean Corpuscular Volume 93 fL () 


 


Mean Corpuscular Hemoglobin 31 pg (25-35) 


 


Mean Corpuscular Hemoglobin


Concent 34 g/dL


(31-37)


 


Red Cell Distribution Width


 16.4 %


(11.5-14.5)


 


Platelet Count


 161 x10^3/uL


(140-400)


 


Neutrophils (%) (Auto) 64 % (31-73) 


 


Lymphocytes (%) (Auto) 19 % (24-48) 


 


Monocytes (%) (Auto) 13 % (0-9) 


 


Eosinophils (%) (Auto) 4 % (0-3) 


 


Basophils (%) (Auto) 0 % (0-3) 


 


Neutrophils # (Auto)


 4.2 x10^3uL


(1.8-7.7)


 


Lymphocytes # (Auto)


 1.2 x10^3/uL


(1.0-4.8)


 


Monocytes # (Auto)


 0.8 x10^3/uL


(0.0-1.1)


 


Eosinophils # (Auto)


 0.2 x10^3/uL


(0.0-0.7)


 


Basophils # (Auto)


 0.0 x10^3/uL


(0.0-0.2)


 


Sodium Level


 141 mmol/L


(136-145)


 


Potassium Level


 3.6 mmol/L


(3.5-5.1)


 


Chloride Level


 104 mmol/L


()


 


Carbon Dioxide Level


 26 mmol/L


(21-32)


 


Anion Gap 11 (6-14) 


 


Blood Urea Nitrogen


 12 mg/dL


(8-26)


 


Creatinine


 0.8 mg/dL


(0.7-1.3)


 


Estimated GFR


(Cockcroft-Gault) 93.5 





 


BUN/Creatinine Ratio 15 (6-20) 


 


Glucose Level


 85 mg/dL


(70-99)


 


Calcium Level


 8.5 mg/dL


(8.5-10.1)


 


Total Bilirubin


 0.9 mg/dL


(0.2-1.0)


 


Aspartate Amino Transf


(AST/SGOT) 19 U/L (15-37) 





 


Alanine Aminotransferase


(ALT/SGPT) 9 U/L (16-63) 





 


Alkaline Phosphatase


 80 U/L


()


 


Total Protein


 6.6 g/dL


(6.4-8.2)


 


Albumin


 2.6 g/dL


(3.4-5.0)


 


Albumin/Globulin Ratio 0.7 (1.0-1.7) 








Laboratory Tests








Test


 3/14/19


04:10


 


White Blood Count


 6.5 x10^3/uL


(4.0-11.0)


 


Red Blood Count


 5.42 x10^6/uL


(4.30-5.70)


 


Hemoglobin


 17.0 g/dL


(13.0-17.5)


 


Hematocrit


 50.4 %


(39.0-53.0)


 


Mean Corpuscular Volume 93 fL () 


 


Mean Corpuscular Hemoglobin 31 pg (25-35) 


 


Mean Corpuscular Hemoglobin


Concent 34 g/dL


(31-37)


 


Red Cell Distribution Width


 16.4 %


(11.5-14.5)


 


Platelet Count


 161 x10^3/uL


(140-400)


 


Neutrophils (%) (Auto) 64 % (31-73) 


 


Lymphocytes (%) (Auto) 19 % (24-48) 


 


Monocytes (%) (Auto) 13 % (0-9) 


 


Eosinophils (%) (Auto) 4 % (0-3) 


 


Basophils (%) (Auto) 0 % (0-3) 


 


Neutrophils # (Auto)


 4.2 x10^3uL


(1.8-7.7)


 


Lymphocytes # (Auto)


 1.2 x10^3/uL


(1.0-4.8)


 


Monocytes # (Auto)


 0.8 x10^3/uL


(0.0-1.1)


 


Eosinophils # (Auto)


 0.2 x10^3/uL


(0.0-0.7)


 


Basophils # (Auto)


 0.0 x10^3/uL


(0.0-0.2)


 


Sodium Level


 141 mmol/L


(136-145)


 


Potassium Level


 3.6 mmol/L


(3.5-5.1)


 


Chloride Level


 104 mmol/L


()


 


Carbon Dioxide Level


 26 mmol/L


(21-32)


 


Anion Gap 11 (6-14) 


 


Blood Urea Nitrogen


 12 mg/dL


(8-26)


 


Creatinine


 0.8 mg/dL


(0.7-1.3)


 


Estimated GFR


(Cockcroft-Gault) 93.5 





 


BUN/Creatinine Ratio 15 (6-20) 


 


Glucose Level


 85 mg/dL


(70-99)


 


Calcium Level


 8.5 mg/dL


(8.5-10.1)


 


Total Bilirubin


 0.9 mg/dL


(0.2-1.0)


 


Aspartate Amino Transf


(AST/SGOT) 19 U/L (15-37) 





 


Alanine Aminotransferase


(ALT/SGPT) 9 U/L (16-63) 





 


Alkaline Phosphatase


 80 U/L


()


 


Total Protein


 6.6 g/dL


(6.4-8.2)


 


Albumin


 2.6 g/dL


(3.4-5.0)


 


Albumin/Globulin Ratio 0.7 (1.0-1.7) 








Microbiology


3/11/19 Blood Culture - Preliminary, Resulted


          NO GROWTH AFTER 2 DAYS


Medications





Current Medications


Fentanyl Citrate (Fentanyl 2ml Vial) 50 mcg 1X  ONCE IV  Last administered on 3/

11/19at 18:52;  Start 3/11/19 at 18:15;  Stop 3/11/19 at 18:16;  Status DC


Clindamycin Phosphate 50 ml @  100 mls/hr 1X  ONCE IV  Last administered on 3/11

/19at 20:21;  Start 3/11/19 at 20:00;  Stop 3/11/19 at 20:29;  Status DC


Ondansetron HCl (Zofran) 4 mg PRN Q8HRS  PRN IV NAUSEA/VOMITING;  Start 3/11/19 

at 20:00;  Stop 3/12/19 at 08:11;  Status DC


Fentanyl Citrate (Fentanyl 2ml Vial) 50 mcg PRN Q1HR  PRN IV PAIN Last 

administered on 3/12/19at 07:05;  Start 3/11/19 at 20:00;  Stop 3/12/19 at 19:59

;  Status DC


Fentanyl Citrate (Fentanyl 2ml Vial) 50 mcg 1X  ONCE IV  Last administered on 3/

11/19at 20:21;  Start 3/11/19 at 20:15;  Stop 3/11/19 at 20:16;  Status DC


Ondansetron HCl (Zofran) 4 mg PRN Q6HRS  PRN IV NAUSEA/VOMITING;  Start 3/12/19 

at 08:15


Oxycodone/ Acetaminophen (Percocet 5/325) 1 tab PRN Q4HRS  PRN PO PAIN Last 

administered on 3/14/19at 11:12;  Start 3/12/19 at 08:15


Acetaminophen (Tylenol) 500 mg PRN Q6HRS  PRN PO HEADACHE / TEMP;  Start 3/12/

19 at 08:15


Clindamycin Phosphate 50 ml @  100 mls/hr Q8HRS IV ;  Start 3/12/19 at 09:00;  

Stop 3/12/19 at 09:21;  Status DC


Aspirin (Ecotrin) 81 mg DAILYWBKFT PO  Last administered on 3/14/19at 07:54;  

Start 3/12/19 at 09:00


Atorvastatin Calcium (Lipitor) 40 mg QHS PO  Last administered on 3/13/19at 21:

01;  Start 3/12/19 at 21:00


Magnesium Hydroxide (Milk Of Magnesia) 400 mg PRN DAILY  PRN PO CONSTIPATION 

Last administered on 3/13/19at 08:45;  Start 3/12/19 at 08:15


Oxycodone/ Acetaminophen (Percocet 5/325) 1 tab PRN Q6HRS  PRN PO PAIN;  Start 3

/12/19 at 08:15;  Status UNV


Linezolid (Zyvox) 600 mg BID PO  Last administered on 3/14/19at 09:05;  Start 3/

12/19 at 09:30


Meropenem 500 mg/ Sodium Chloride 50 ml @  100 mls/hr Q6HRS IV  Last 

administered on 3/14/19at 05:19;  Start 3/12/19 at 12:00;  Stop 3/14/19 at 10:17

;  Status DC


Micafungin Sodium 100 mg/Dextrose 100 ml @  100 mls/hr Q24H IV  Last 

administered on 3/13/19at 10:38;  Start 3/12/19 at 10:00;  Stop 3/14/19 at 10:17

;  Status DC


Lactobacillus Rhamnosus (Culturelle) 1 cap BID PO  Last administered on 3/14/

19at 09:05;  Start 3/12/19 at 21:00


Hydroxyurea (Hydrea) 1,000 mg DAILY PO  Last administered on 3/14/19at 09:32;  

Start 3/13/19 at 10:00


Enoxaparin Sodium (Lovenox 40mg Syringe) 40 mg Q24H SQ  Last administered on 3/

14/19at 11:12;  Start 3/13/19 at 10:00


Ciprofloxacin (Cipro) 500 mg BID PO ;  Start 3/14/19 at 21:00





Active Scripts


Active


Hydrea (Hydroxyurea) 500 Mg Capsule 1,000 Mg PO DAILY MDD 1 30 Days


Zyvox (Linezolid) 600 Mg Tablet 600 Mg PO BID MDD 1 10 Days


Cipro (Ciprofloxacin Hcl) 250 Mg Tablet 500 Mg PO BID MDD 1 10 Days


Percocet 5-325 Mg Tablet ** (Oxycodone/Acetaminophen) 1 Each Tablet 1 Tab PO 

PRN Q6HRS PRN


Aspirin Ec (Aspirin) 81 Mg Tablet. 81 Mg PO DAILYWBKFT


Atorvastatin Calcium 40 Mg Tablet 40 Mg PO QHS


Reported


Aquino' Milk Of Magnesia (Magnesium Hydroxide) 400 Mg/5 Ml Oral.susp 400 Mg 

PO PRN DAILY PRN


Doxycycline Hyclate 100 Mg Tablet. 1 Tab PO BID 1 Days


Vitals/I & O





Vital Sign - Last 24 Hours








 3/13/19 3/13/19 3/13/19 3/13/19





 11:15 15:03 17:33 19:00


 


Temp 98.0 98.1  98.5





 98.0 98.1  98.5


 


Pulse 97 64  76


 


Resp 18 20 16 18


 


B/P (MAP) 123/57 (79) 114/53 (73)  101/42 (61)


 


Pulse Ox 94 94  93


 


O2 Delivery Room Air Room Air Room Air Room Air


 


    





    





 3/13/19 3/13/19 3/14/19 3/14/19





 20:00 23:00 00:23 03:00


 


Temp  98.3  97.9





  98.3  97.9


 


Pulse  68  83


 


Resp  18 20 18


 


B/P (MAP)  130/41 (70)  106/55 (72)


 


Pulse Ox  92 92 94


 


O2 Delivery Room Air Room Air Room Air Room Air


 


    





    





 3/14/19 3/14/19 3/14/19 3/14/19





 05:21 06:21 07:00 08:20


 


Pulse   61 


 


Resp 20 20 16 


 


B/P (MAP)   122/61 (81) 


 


Pulse Ox 94 94 92 


 


O2 Delivery Room Air Room Air Room Air Room Air





 3/14/19   





 11:12   


 


Resp 16   


 


O2 Delivery Room Air   














Intake and Output   


 


 3/13/19 3/13/19 3/14/19





 14:59 22:59 06:59


 


Intake Total 680 ml 360 ml 0 ml


 


Output Total 250 ml 200 ml 800 ml


 


Balance 430 ml 160 ml -800 ml

















JORGE ELKINS APRN Mar 14, 2019 11:28

## 2019-03-14 NOTE — PDOC
Infectious Disease Note


Subjective


Subjective


pt is feeling ok





ROS


ROS


no n/v/d/





Vital Sign


Vital Signs





Vital Signs








  Date Time  Temp Pulse Resp B/P (MAP) Pulse Ox O2 Delivery O2 Flow Rate FiO2


 


3/14/19 08:20      Room Air  


 


3/14/19 07:00  61 16 122/61 (81) 92   


 


3/14/19 03:00 97.9       





 97.9       











Physical Exam


PHYSICAL EXAM


heent nad


neck supple 


lungs clear


heart s1s2 rr


abd soft nt no organomegaly


ext , dry flaky skin, rt toe has wound, rt dorsal foot has wound with drainage


cns nad





Labs


Lab





Laboratory Tests








Test


 3/14/19


04:10


 


White Blood Count


 6.5 x10^3/uL


(4.0-11.0)


 


Red Blood Count


 5.42 x10^6/uL


(4.30-5.70)


 


Hemoglobin


 17.0 g/dL


(13.0-17.5)


 


Hematocrit


 50.4 %


(39.0-53.0)


 


Mean Corpuscular Volume 93 fL () 


 


Mean Corpuscular Hemoglobin 31 pg (25-35) 


 


Mean Corpuscular Hemoglobin


Concent 34 g/dL


(31-37)


 


Red Cell Distribution Width


 16.4 %


(11.5-14.5)


 


Platelet Count


 161 x10^3/uL


(140-400)


 


Neutrophils (%) (Auto) 64 % (31-73) 


 


Lymphocytes (%) (Auto) 19 % (24-48) 


 


Monocytes (%) (Auto) 13 % (0-9) 


 


Eosinophils (%) (Auto) 4 % (0-3) 


 


Basophils (%) (Auto) 0 % (0-3) 


 


Neutrophils # (Auto)


 4.2 x10^3uL


(1.8-7.7)


 


Lymphocytes # (Auto)


 1.2 x10^3/uL


(1.0-4.8)


 


Monocytes # (Auto)


 0.8 x10^3/uL


(0.0-1.1)


 


Eosinophils # (Auto)


 0.2 x10^3/uL


(0.0-0.7)


 


Basophils # (Auto)


 0.0 x10^3/uL


(0.0-0.2)


 


Sodium Level


 141 mmol/L


(136-145)


 


Potassium Level


 3.6 mmol/L


(3.5-5.1)


 


Chloride Level


 104 mmol/L


()


 


Carbon Dioxide Level


 26 mmol/L


(21-32)


 


Anion Gap 11 (6-14) 


 


Blood Urea Nitrogen


 12 mg/dL


(8-26)


 


Creatinine


 0.8 mg/dL


(0.7-1.3)


 


Estimated GFR


(Cockcroft-Gault) 93.5 





 


BUN/Creatinine Ratio 15 (6-20) 


 


Glucose Level


 85 mg/dL


(70-99)


 


Calcium Level


 8.5 mg/dL


(8.5-10.1)


 


Total Bilirubin


 0.9 mg/dL


(0.2-1.0)


 


Aspartate Amino Transf


(AST/SGOT) 19 U/L (15-37) 





 


Alanine Aminotransferase


(ALT/SGPT) 9 U/L (16-63) 





 


Alkaline Phosphatase


 80 U/L


()


 


Total Protein


 6.6 g/dL


(6.4-8.2)


 


Albumin


 2.6 g/dL


(3.4-5.0)


 


Albumin/Globulin Ratio 0.7 (1.0-1.7) 








Micro





Microbiology


3/11/19 Blood Culture - Preliminary, Resulted


          NO GROWTH AFTER 1 DAY





Objective


Assessment


RLE cellulitis


R foot wound


PAD


Tobacco abuse


H/o MSSA/Enterobacter


Tinea





Plan


Plan of Care


micafungin/Meropenem/Zyvox,,, change t o po zyvox and cipro


vascular at  vs d/c


D/c tobacco


F/u labs and cults


local wound care











MIKA SEGOVIA MD Mar 14, 2019 10:15

## 2019-03-14 NOTE — PDOC3
Discharge Summary


Visit Information


Date of Admission:  Mar 11, 2019


Date of Discharge:  Mar 14, 2019


Admitting Diagnosis Comment:


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


 bl leg cellulitis


PAD with bl iliafemeral A occlusion


hypotension, resolved


h/o dvt, PE


tobacoism


COPD


polythemia vera


Final Diagnosis


Problems


Medical Problems:


(1) Cellulitis of lower extremity


Status: Acute  











Brief Hospital Course


Allergies





 Allergies








Coded Allergies Type Severity Reaction Last Updated Verified


 


  No Known Drug Allergies    5/6/17 No








Vital Signs





Vital Signs








  Date Time  Temp Pulse Resp B/P (MAP) Pulse Ox O2 Delivery O2 Flow Rate FiO2


 


3/14/19 08:20      Room Air  


 


3/14/19 07:00  61 16 122/61 (81) 92   


 


3/14/19 03:00 97.9       





 97.9       








Lab Results





Laboratory Tests








Test


 3/14/19


04:10


 


White Blood Count


 6.5 x10^3/uL


(4.0-11.0)


 


Red Blood Count


 5.42 x10^6/uL


(4.30-5.70)


 


Hemoglobin


 17.0 g/dL


(13.0-17.5)


 


Hematocrit


 50.4 %


(39.0-53.0)


 


Mean Corpuscular Volume 93 fL () 


 


Mean Corpuscular Hemoglobin 31 pg (25-35) 


 


Mean Corpuscular Hemoglobin


Concent 34 g/dL


(31-37)


 


Red Cell Distribution Width


 16.4 %


(11.5-14.5)


 


Platelet Count


 161 x10^3/uL


(140-400)


 


Neutrophils (%) (Auto) 64 % (31-73) 


 


Lymphocytes (%) (Auto) 19 % (24-48) 


 


Monocytes (%) (Auto) 13 % (0-9) 


 


Eosinophils (%) (Auto) 4 % (0-3) 


 


Basophils (%) (Auto) 0 % (0-3) 


 


Neutrophils # (Auto)


 4.2 x10^3uL


(1.8-7.7)


 


Lymphocytes # (Auto)


 1.2 x10^3/uL


(1.0-4.8)


 


Monocytes # (Auto)


 0.8 x10^3/uL


(0.0-1.1)


 


Eosinophils # (Auto)


 0.2 x10^3/uL


(0.0-0.7)


 


Basophils # (Auto)


 0.0 x10^3/uL


(0.0-0.2)


 


Sodium Level


 141 mmol/L


(136-145)


 


Potassium Level


 3.6 mmol/L


(3.5-5.1)


 


Chloride Level


 104 mmol/L


()


 


Carbon Dioxide Level


 26 mmol/L


(21-32)


 


Anion Gap 11 (6-14) 


 


Blood Urea Nitrogen


 12 mg/dL


(8-26)


 


Creatinine


 0.8 mg/dL


(0.7-1.3)


 


Estimated GFR


(Cockcroft-Gault) 93.5 





 


BUN/Creatinine Ratio 15 (6-20) 


 


Glucose Level


 85 mg/dL


(70-99)


 


Calcium Level


 8.5 mg/dL


(8.5-10.1)


 


Total Bilirubin


 0.9 mg/dL


(0.2-1.0)


 


Aspartate Amino Transf


(AST/SGOT) 19 U/L (15-37) 





 


Alanine Aminotransferase


(ALT/SGPT) 9 U/L (16-63) 





 


Alkaline Phosphatase


 80 U/L


()


 


Total Protein


 6.6 g/dL


(6.4-8.2)


 


Albumin


 2.6 g/dL


(3.4-5.0)


 


Albumin/Globulin Ratio 0.7 (1.0-1.7) 








Laboratory Tests








Test


 3/14/19


04:10


 


White Blood Count


 6.5 x10^3/uL


(4.0-11.0)


 


Red Blood Count


 5.42 x10^6/uL


(4.30-5.70)


 


Hemoglobin


 17.0 g/dL


(13.0-17.5)


 


Hematocrit


 50.4 %


(39.0-53.0)


 


Mean Corpuscular Volume 93 fL () 


 


Mean Corpuscular Hemoglobin 31 pg (25-35) 


 


Mean Corpuscular Hemoglobin


Concent 34 g/dL


(31-37)


 


Red Cell Distribution Width


 16.4 %


(11.5-14.5)


 


Platelet Count


 161 x10^3/uL


(140-400)


 


Neutrophils (%) (Auto) 64 % (31-73) 


 


Lymphocytes (%) (Auto) 19 % (24-48) 


 


Monocytes (%) (Auto) 13 % (0-9) 


 


Eosinophils (%) (Auto) 4 % (0-3) 


 


Basophils (%) (Auto) 0 % (0-3) 


 


Neutrophils # (Auto)


 4.2 x10^3uL


(1.8-7.7)


 


Lymphocytes # (Auto)


 1.2 x10^3/uL


(1.0-4.8)


 


Monocytes # (Auto)


 0.8 x10^3/uL


(0.0-1.1)


 


Eosinophils # (Auto)


 0.2 x10^3/uL


(0.0-0.7)


 


Basophils # (Auto)


 0.0 x10^3/uL


(0.0-0.2)


 


Sodium Level


 141 mmol/L


(136-145)


 


Potassium Level


 3.6 mmol/L


(3.5-5.1)


 


Chloride Level


 104 mmol/L


()


 


Carbon Dioxide Level


 26 mmol/L


(21-32)


 


Anion Gap 11 (6-14) 


 


Blood Urea Nitrogen


 12 mg/dL


(8-26)


 


Creatinine


 0.8 mg/dL


(0.7-1.3)


 


Estimated GFR


(Cockcroft-Gault) 93.5 





 


BUN/Creatinine Ratio 15 (6-20) 


 


Glucose Level


 85 mg/dL


(70-99)


 


Calcium Level


 8.5 mg/dL


(8.5-10.1)


 


Total Bilirubin


 0.9 mg/dL


(0.2-1.0)


 


Aspartate Amino Transf


(AST/SGOT) 19 U/L (15-37) 





 


Alanine Aminotransferase


(ALT/SGPT) 9 U/L (16-63) 





 


Alkaline Phosphatase


 80 U/L


()


 


Total Protein


 6.6 g/dL


(6.4-8.2)


 


Albumin


 2.6 g/dL


(3.4-5.0)


 


Albumin/Globulin Ratio 0.7 (1.0-1.7) 








Brief Hospital Course


Mr. Olson  is a 78 old white  male who has significant PAD, multiple admissions 

related to PAD complications including wounds etc. Also history of P vera known 

to heme onc, Dr. Holland. Also history of COPD tobaccoism, history of PE or DVT 

not sure if on OAC-? Anyways poor historian, some paranoia, hard to keep him 

focused and focuses on issues regarding "poisoning his legs".


In any case severe PAD even amputation might not be able to solve his severe 

PAD problems but now he has complicated wounds on both legs.


Comanagement with vascular surgery and ID.


Vascular surgery recommends a transfer because need some equipment that is not 

available here at [facility]


Patient took 24- 48 hrs. to decide initially did not want to go, but seemingly 

agreeable now.


I did try to reach out to a son named? Vitor?? [phone] but to no avail.


ID has convinced the patient to go to  and he seems now agreeable today


All meds on chart and paperwork done


Patient seen and examined, and significant time in the room today, tensed 

situation between him and me


Consults performed vasc surgery, ID


Procedures performed none





Discharge Information


Condition at Discharge:  Improved, Stable


Disposition/Orders:  Other ( transfer)


Scheduled


Aspirin (Aspirin Ec) 81 Mg Tablet., 81 MG PO DAILYWBKFT for clot prevention, #

100


   Prescribed by: KATIE BECKHAM on 12/28/18 1030


   Last Action: Continued on 3/12/19 0811 by GONZALO MEDLEY


Atorvastatin Calcium (Atorvastatin Calcium) 40 Mg Tablet, 40 MG PO QHS for 

cholesterol, #30


   Prescribed by: KATIE BECKHAM on 12/28/18 1030


   Last Action: Continued on 3/12/19 0811 by GONZALO MEDLEY


Ciprofloxacin Hcl (Cipro) 250 Mg Tablet, 500 MG PO BID for wound, legs MDD 1 

for 10 Days, #40


   Prescribed by: GONZALO MEDLEY on 3/14/19 1031


Doxycycline Hyclate (Doxycycline Hyclate) 100 Mg Tablet., 1 TAB PO BID for 

cellulitis for 1 Days, #2 (Reported)


   Entered as Reported by: ALVA SHAIKH on 3/11/19 2222


   Last Action: HELD on 3/12/19 0811 by GONZALO MEDLEY


Hydroxyurea (Hydrea) 500 Mg Capsule, 1,000 MG PO DAILY for p vera MDD 1 for 30 

Days, #60


   Prescribed by: GONZALO MEDLEY on 3/14/19 1031


Linezolid (Zyvox) 600 Mg Tablet, 600 MG PO BID for wound, leg MDD 1 for 10 Days

, #20


   Prescribed by: GONZALO MEDLEY on 3/14/19 1031





Scheduled PRN


Magnesium Hydroxide (Aquino' Milk Of Magnesia) 400 Mg/5 Ml Oral.susp, 400 MG 

PO PRN DAILY PRN for CONSTIPATION, (Reported)


   Entered as Reported by: ALVA SHAIKH on 3/11/19 2222


   Last Action: Continued on 3/12/19 0811 by GONZALO MEDLEY


Oxycodone/Apap 5-325 (Percocet 5-325 Mg Tablet **) 1 Each Tablet, 1 TAB PO PRN 

Q6HRS PRN for PAIN, #30 Ref 0


   Prescribed by: KATIE BECKHAM on 1/23/19 1228


   Last Action: Continued on 3/12/19 0811 by GONZALO KHOURY MD Mar 14, 2019 10:36

## 2019-03-14 NOTE — PDOC
PROGRESS NOTES


History of Present Illness


History of Present Illness





Assessment/Plan


Assessment/Plan


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  


New complete occlusion of the left iliofemoral arterial system  


Nonvisualization of the bilateral anterior tibial arteries be secondary


to a diffuse high-grade stenosis or complete occlusion.  NOT IMPROVED


Enlarged prostate.  


Moderate atherosclerotic disease of the infrarenal aorta with 


    circumferential soft plaque.


bl leg cellulitis NOT IMPROVED


hypotension, 


h/o dvt, PE


tobacco abuse


COPD


polythemia vera


MODERATE PROTEIN-CALORIC MALNUTRITION





PLAN:





ADMIT





2 midnights


[facility] oncology //vascular, 


ID CONSULTED


home meds


Pain control 


iv micafungin/Meropenem/Zyvox CONT


vascular consult


D/c tobacco, education provided


3/13 start Hydrea 1000 mg daily, // aspirin 81 mg daily


plan  transfer the patient to [facility] where hybrid operative suites are available.

,  





3/14 transfer to [facility] for revascularization awaiting patient's 

decision. HIGH RISK OF COMPLICATIONS INCLUDING LIMB LOSS WITHOUT VASCULAR 

SALVAGE





Vitals


Vitals





Vital Signs








  Date Time  Temp Pulse Resp B/P (MAP) Pulse Ox O2 Delivery O2 Flow Rate FiO2


 


3/14/19 08:20      Room Air  


 


3/14/19 07:00  61 16 122/61 (81) 92   


 


3/14/19 03:00 97.9       





 97.9       











Physical Exam


Physical Exam


heent nad


neck supple 


lungs clear


heart s1s2 rr


abd soft nt no organomegaly


ext , dry flaky skin, rt toe has wound, rt dorsal foot has wound with drainage


cns nad


General:  Alert, Oriented X3, Cooperative, mild distress


Heart:  Regular rate (with occassional missed beats), Normal S1, Normal S2, No 

murmurs, Other (Palpable bilateral carotid, brachial and radial pulses.  Unable 

to palpate femoral, popliteal, dorsalis pedal or post tibial pulses.)


Lungs:  Clear


Abdomen:  Normal bowel sounds, Soft, No tenderness


Extremities:  Other (Improved lower extremity swelling with diuresis in ER. )


Skin:  Other (Well-healed right leg incision from previous bypass.  Right 

forefoot odorous, open ulcer with necrotic tissue to wound base.  Left forefoot 

skin discolored.  Bilateral lower extremity erythema with cellulitis and dry-

flaking skin.  Extreme pain to very light palpation of toes and forefoot.  

Cracked fissures noted to plantar side of 1st toe.  )





Labs


LABS





Laboratory Tests








Test


 3/14/19


04:10


 


White Blood Count


 6.5 x10^3/uL


(4.0-11.0)


 


Red Blood Count


 5.42 x10^6/uL


(4.30-5.70)


 


Hemoglobin


 17.0 g/dL


(13.0-17.5)


 


Hematocrit


 50.4 %


(39.0-53.0)


 


Mean Corpuscular Volume 93 fL () 


 


Mean Corpuscular Hemoglobin 31 pg (25-35) 


 


Mean Corpuscular Hemoglobin


Concent 34 g/dL


(31-37)


 


Red Cell Distribution Width


 16.4 %


(11.5-14.5)


 


Platelet Count


 161 x10^3/uL


(140-400)


 


Neutrophils (%) (Auto) 64 % (31-73) 


 


Lymphocytes (%) (Auto) 19 % (24-48) 


 


Monocytes (%) (Auto) 13 % (0-9) 


 


Eosinophils (%) (Auto) 4 % (0-3) 


 


Basophils (%) (Auto) 0 % (0-3) 


 


Neutrophils # (Auto)


 4.2 x10^3uL


(1.8-7.7)


 


Lymphocytes # (Auto)


 1.2 x10^3/uL


(1.0-4.8)


 


Monocytes # (Auto)


 0.8 x10^3/uL


(0.0-1.1)


 


Eosinophils # (Auto)


 0.2 x10^3/uL


(0.0-0.7)


 


Basophils # (Auto)


 0.0 x10^3/uL


(0.0-0.2)


 


Sodium Level


 141 mmol/L


(136-145)


 


Potassium Level


 3.6 mmol/L


(3.5-5.1)


 


Chloride Level


 104 mmol/L


()


 


Carbon Dioxide Level


 26 mmol/L


(21-32)


 


Anion Gap 11 (6-14) 


 


Blood Urea Nitrogen


 12 mg/dL


(8-26)


 


Creatinine


 0.8 mg/dL


(0.7-1.3)


 


Estimated GFR


(Cockcroft-Gault) 93.5 





 


BUN/Creatinine Ratio 15 (6-20) 


 


Glucose Level


 85 mg/dL


(70-99)


 


Calcium Level


 8.5 mg/dL


(8.5-10.1)


 


Total Bilirubin


 0.9 mg/dL


(0.2-1.0)


 


Aspartate Amino Transf


(AST/SGOT) 19 U/L (15-37) 





 


Alanine Aminotransferase


(ALT/SGPT) 9 U/L (16-63) 





 


Alkaline Phosphatase


 80 U/L


()


 


Total Protein


 6.6 g/dL


(6.4-8.2)


 


Albumin


 2.6 g/dL


(3.4-5.0)


 


Albumin/Globulin Ratio 0.7 (1.0-1.7) 











Assessment and Plan


Assessmemt and Plan


Problems


Medical Problems:


(1) Cellulitis of lower extremity


Status: Acute  











Comment


Review of Relevant


I have reviewed the following items karen (where applicable) has been applied.


Labs





Laboratory Tests








Test


 3/14/19


04:10


 


White Blood Count


 6.5 x10^3/uL


(4.0-11.0)


 


Red Blood Count


 5.42 x10^6/uL


(4.30-5.70)


 


Hemoglobin


 17.0 g/dL


(13.0-17.5)


 


Hematocrit


 50.4 %


(39.0-53.0)


 


Mean Corpuscular Volume 93 fL () 


 


Mean Corpuscular Hemoglobin 31 pg (25-35) 


 


Mean Corpuscular Hemoglobin


Concent 34 g/dL


(31-37)


 


Red Cell Distribution Width


 16.4 %


(11.5-14.5)


 


Platelet Count


 161 x10^3/uL


(140-400)


 


Neutrophils (%) (Auto) 64 % (31-73) 


 


Lymphocytes (%) (Auto) 19 % (24-48) 


 


Monocytes (%) (Auto) 13 % (0-9) 


 


Eosinophils (%) (Auto) 4 % (0-3) 


 


Basophils (%) (Auto) 0 % (0-3) 


 


Neutrophils # (Auto)


 4.2 x10^3uL


(1.8-7.7)


 


Lymphocytes # (Auto)


 1.2 x10^3/uL


(1.0-4.8)


 


Monocytes # (Auto)


 0.8 x10^3/uL


(0.0-1.1)


 


Eosinophils # (Auto)


 0.2 x10^3/uL


(0.0-0.7)


 


Basophils # (Auto)


 0.0 x10^3/uL


(0.0-0.2)


 


Sodium Level


 141 mmol/L


(136-145)


 


Potassium Level


 3.6 mmol/L


(3.5-5.1)


 


Chloride Level


 104 mmol/L


()


 


Carbon Dioxide Level


 26 mmol/L


(21-32)


 


Anion Gap 11 (6-14) 


 


Blood Urea Nitrogen


 12 mg/dL


(8-26)


 


Creatinine


 0.8 mg/dL


(0.7-1.3)


 


Estimated GFR


(Cockcroft-Gault) 93.5 





 


BUN/Creatinine Ratio 15 (6-20) 


 


Glucose Level


 85 mg/dL


(70-99)


 


Calcium Level


 8.5 mg/dL


(8.5-10.1)


 


Total Bilirubin


 0.9 mg/dL


(0.2-1.0)


 


Aspartate Amino Transf


(AST/SGOT) 19 U/L (15-37) 





 


Alanine Aminotransferase


(ALT/SGPT) 9 U/L (16-63) 





 


Alkaline Phosphatase


 80 U/L


()


 


Total Protein


 6.6 g/dL


(6.4-8.2)


 


Albumin


 2.6 g/dL


(3.4-5.0)


 


Albumin/Globulin Ratio 0.7 (1.0-1.7) 








Laboratory Tests








Test


 3/14/19


04:10


 


White Blood Count


 6.5 x10^3/uL


(4.0-11.0)


 


Red Blood Count


 5.42 x10^6/uL


(4.30-5.70)


 


Hemoglobin


 17.0 g/dL


(13.0-17.5)


 


Hematocrit


 50.4 %


(39.0-53.0)


 


Mean Corpuscular Volume 93 fL () 


 


Mean Corpuscular Hemoglobin 31 pg (25-35) 


 


Mean Corpuscular Hemoglobin


Concent 34 g/dL


(31-37)


 


Red Cell Distribution Width


 16.4 %


(11.5-14.5)


 


Platelet Count


 161 x10^3/uL


(140-400)


 


Neutrophils (%) (Auto) 64 % (31-73) 


 


Lymphocytes (%) (Auto) 19 % (24-48) 


 


Monocytes (%) (Auto) 13 % (0-9) 


 


Eosinophils (%) (Auto) 4 % (0-3) 


 


Basophils (%) (Auto) 0 % (0-3) 


 


Neutrophils # (Auto)


 4.2 x10^3uL


(1.8-7.7)


 


Lymphocytes # (Auto)


 1.2 x10^3/uL


(1.0-4.8)


 


Monocytes # (Auto)


 0.8 x10^3/uL


(0.0-1.1)


 


Eosinophils # (Auto)


 0.2 x10^3/uL


(0.0-0.7)


 


Basophils # (Auto)


 0.0 x10^3/uL


(0.0-0.2)


 


Sodium Level


 141 mmol/L


(136-145)


 


Potassium Level


 3.6 mmol/L


(3.5-5.1)


 


Chloride Level


 104 mmol/L


()


 


Carbon Dioxide Level


 26 mmol/L


(21-32)


 


Anion Gap 11 (6-14) 


 


Blood Urea Nitrogen


 12 mg/dL


(8-26)


 


Creatinine


 0.8 mg/dL


(0.7-1.3)


 


Estimated GFR


(Cockcroft-Gault) 93.5 





 


BUN/Creatinine Ratio 15 (6-20) 


 


Glucose Level


 85 mg/dL


(70-99)


 


Calcium Level


 8.5 mg/dL


(8.5-10.1)


 


Total Bilirubin


 0.9 mg/dL


(0.2-1.0)


 


Aspartate Amino Transf


(AST/SGOT) 19 U/L (15-37) 





 


Alanine Aminotransferase


(ALT/SGPT) 9 U/L (16-63) 





 


Alkaline Phosphatase


 80 U/L


()


 


Total Protein


 6.6 g/dL


(6.4-8.2)


 


Albumin


 2.6 g/dL


(3.4-5.0)


 


Albumin/Globulin Ratio 0.7 (1.0-1.7) 








Microbiology


3/11/19 Blood Culture - Preliminary, Resulted


          NO GROWTH AFTER 2 DAYS


Medications





Current Medications


Fentanyl Citrate (Fentanyl 2ml Vial) 50 mcg 1X  ONCE IV  Last administered on 3/

11/19at 18:52;  Start 3/11/19 at 18:15;  Stop 3/11/19 at 18:16;  Status DC


Clindamycin Phosphate 50 ml @  100 mls/hr 1X  ONCE IV  Last administered on 3/11

/19at 20:21;  Start 3/11/19 at 20:00;  Stop 3/11/19 at 20:29;  Status DC


Ondansetron HCl (Zofran) 4 mg PRN Q8HRS  PRN IV NAUSEA/VOMITING;  Start 3/11/19 

at 20:00;  Stop 3/12/19 at 08:11;  Status DC


Fentanyl Citrate (Fentanyl 2ml Vial) 50 mcg PRN Q1HR  PRN IV PAIN Last 

administered on 3/12/19at 07:05;  Start 3/11/19 at 20:00;  Stop 3/12/19 at 19:59

;  Status DC


Fentanyl Citrate (Fentanyl 2ml Vial) 50 mcg 1X  ONCE IV  Last administered on 3/

11/19at 20:21;  Start 3/11/19 at 20:15;  Stop 3/11/19 at 20:16;  Status DC


Ondansetron HCl (Zofran) 4 mg PRN Q6HRS  PRN IV NAUSEA/VOMITING;  Start 3/12/19 

at 08:15


Oxycodone/ Acetaminophen (Percocet 5/325) 1 tab PRN Q4HRS  PRN PO PAIN Last 

administered on 3/14/19at 05:21;  Start 3/12/19 at 08:15


Acetaminophen (Tylenol) 500 mg PRN Q6HRS  PRN PO HEADACHE / TEMP;  Start 3/12/

19 at 08:15


Clindamycin Phosphate 50 ml @  100 mls/hr Q8HRS IV ;  Start 3/12/19 at 09:00;  

Stop 3/12/19 at 09:21;  Status DC


Aspirin (Ecotrin) 81 mg DAILYWBKFT PO  Last administered on 3/14/19at 07:54;  

Start 3/12/19 at 09:00


Atorvastatin Calcium (Lipitor) 40 mg QHS PO  Last administered on 3/13/19at 21:

01;  Start 3/12/19 at 21:00


Magnesium Hydroxide (Milk Of Magnesia) 400 mg PRN DAILY  PRN PO CONSTIPATION 

Last administered on 3/13/19at 08:45;  Start 3/12/19 at 08:15


Oxycodone/ Acetaminophen (Percocet 5/325) 1 tab PRN Q6HRS  PRN PO PAIN;  Start 3

/12/19 at 08:15;  Status UNV


Linezolid (Zyvox) 600 mg BID PO  Last administered on 3/14/19at 09:05;  Start 3/

12/19 at 09:30


Meropenem 500 mg/ Sodium Chloride 50 ml @  100 mls/hr Q6HRS IV  Last 

administered on 3/14/19at 05:19;  Start 3/12/19 at 12:00;  Stop 3/14/19 at 10:17

;  Status DC


Micafungin Sodium 100 mg/Dextrose 100 ml @  100 mls/hr Q24H IV  Last 

administered on 3/13/19at 10:38;  Start 3/12/19 at 10:00;  Stop 3/14/19 at 10:17

;  Status DC


Lactobacillus Rhamnosus (Culturelle) 1 cap BID PO  Last administered on 3/14/

19at 09:05;  Start 3/12/19 at 21:00


Hydroxyurea (Hydrea) 1,000 mg DAILY PO  Last administered on 3/14/19at 09:32;  

Start 3/13/19 at 10:00


Enoxaparin Sodium (Lovenox 40mg Syringe) 40 mg Q24H SQ  Last administered on 3/

13/19at 10:38;  Start 3/13/19 at 10:00


Ciprofloxacin (Cipro) 500 mg BID PO ;  Start 3/14/19 at 21:00





Active Scripts


Active


Hydrea (Hydroxyurea) 500 Mg Capsule 1,000 Mg PO DAILY MDD 1 30 Days


Zyvox (Linezolid) 600 Mg Tablet 600 Mg PO BID MDD 1 10 Days


Cipro (Ciprofloxacin Hcl) 250 Mg Tablet 500 Mg PO BID MDD 1 10 Days


Percocet 5-325 Mg Tablet ** (Oxycodone/Acetaminophen) 1 Each Tablet 1 Tab PO 

PRN Q6HRS PRN


Aspirin Ec (Aspirin) 81 Mg Tablet. 81 Mg PO DAILYWBKFT


Atorvastatin Calcium 40 Mg Tablet 40 Mg PO QHS


Reported


Aquino' Milk Of Magnesia (Magnesium Hydroxide) 400 Mg/5 Ml Oral.susp 400 Mg 

PO PRN DAILY PRN


Doxycycline Hyclate 100 Mg Tablet. 1 Tab PO BID 1 Days


Vitals/I & O





Vital Sign - Last 24 Hours








 3/13/19 3/13/19 3/13/19 3/13/19





 11:15 15:03 17:33 19:00


 


Temp 98.0 98.1  98.5





 98.0 98.1  98.5


 


Pulse 97 64  76


 


Resp 18 20 16 18


 


B/P (MAP) 123/57 (79) 114/53 (73)  101/42 (61)


 


Pulse Ox 94 94  93


 


O2 Delivery Room Air Room Air Room Air Room Air


 


    





    





 3/13/19 3/13/19 3/14/19 3/14/19





 20:00 23:00 00:23 03:00


 


Temp  98.3  97.9





  98.3  97.9


 


Pulse  68  83


 


Resp  18 20 18


 


B/P (MAP)  130/41 (70)  106/55 (72)


 


Pulse Ox  92 92 94


 


O2 Delivery Room Air Room Air Room Air Room Air


 


    





    





 3/14/19 3/14/19 3/14/19 3/14/19





 05:21 06:21 07:00 08:20


 


Pulse   61 


 


Resp 20 20 16 


 


B/P (MAP)   122/61 (81) 


 


Pulse Ox 94 94 92 


 


O2 Delivery Room Air Room Air Room Air Room Air














Intake and Output   


 


 3/13/19 3/13/19 3/14/19





 15:00 23:00 07:00


 


Intake Total 680 ml 360 ml 0 ml


 


Output Total 250 ml 200 ml 800 ml


 


Balance 430 ml 160 ml -800 ml

















SHIRLEY DEMARCO MD Mar 14, 2019 10:54

## 2019-03-14 NOTE — NUR
Spoke with MITCH Aguayo with [facility]. Discussed POC/hx/medication. [facility] accepts pt. Exit wound 
pictures obtained.

## 2019-03-14 NOTE — NUR
SW following. Discussed with RN. Dr. Boateng requesting inpatient transfer to  and advised 
SW she had initiated the transfer. SW confirmed with [facility] and faxed clinicals and had 
radiology cloud the images. SW awaiting updates from  regarding transfer acceptance or 
denial. RN notified.

## 2021-08-18 NOTE — CONS
DATE OF CONSULTATION:  12/22/2018



MARIPOSA Traore, dictating for Gigi Colorado MD



REFERRING PHYSICIAN:  Dr. Carrillo.



REASON FOR CONSULTATION:  Ischemic foot with cellulitis and wound.



HISTORY OF PRESENT ILLNESS:  This patient is a 78-year-old  male with a

past medical history of polycythemia, DVT/PE and peripheral arterial disease. 

He says he noticed an ulcer develop on his right foot about 3 weeks ago that has

increased in size and depth with drainage and area of redness and swelling.  He

also has developed a smaller ulcer along the right shin.  An x-ray of the right

foot showed mild soft tissue swelling identified dorsal to the metatarsal.  No

acute osseous findings.  Venous ultrasound was negative for DVT.  Right groin

lymph node borderline and presumably reactive.  He had a normal white blood cell

count and a sed rate of 0 and CRP 7.7.  A culture was obtained earlier today. 

He is currently on vancomycin.



The patient was evaluated by Dr. Pink.  A CTA in 2017 showed occlusive

disease of the right common iliac artery with collateral flow.  The need for

aortofemoral bypass graft and possible right femoral popliteal bypass graft is

under consideration.  The patient says he has had history of cellulitis in the

past along with chronic swelling of his right leg.  He complains of quite a bit

of dryness as well as dry skin.  He denies fevers, chills, sweats or body aches.

 Denies nausea, vomiting or diarrhea.  Denies shortness of air, cough or chest

discomfort.



PAST MEDICAL HISTORY:  Peripheral vascular disease, tinnitus, constipation,

hyperlipidemia, chronic obstructive pulmonary disease, addictions and

osteoarthritis.



PAST SURGICAL HISTORY:  Right femoral popliteal bypass graft in 2004.



FAMILY HISTORY:  Positive for hypercholesterol.



SOCIAL HISTORY:  The patient is single and lives at home.  He is a current

smoker and drinks alcohol rarely.



ALLERGIES:  No known drug allergies.



MEDICATIONS:  Vancomycin.  Other medications are available and have been

reviewed on the MAR.



PHYSICAL EXAMINATION:

VITAL SIGNS:  Temperature 98.2, blood pressure 96/51, heart rate 92, respiratory

rate 20, pulse oximetry 94% on room air.

GENERAL:  The patient is propped up in bed, alert and watching TV.

HEENT:  Pupils equally round.  Normal conjunctivae.  Oral cavity:  Pharynx pink

and moist.

NECK:  Supple.

LUNGS:  Clear to auscultation.

HEART:  S1, S2.

ABDOMEN:  Nondistended, soft and nontender with bowel sounds present.

EXTREMITIES:  No gross edema or cyanosis.  Right lower extremity rubor with

wrinkles and dry flaky skin.  He has 2 ulcers located anterolateral aspect of

the shin and dorsal aspect of the right foot with bleeding.  Dorsalis pedis

pulse difficult to palpate.  His toe nails are thick and yellow.

SKIN:  Warm without rash.

NEUROLOGIC:  Alert and oriented x 3.



LABORATORY DATA:  From 12/20/2018, WBC 7.9; hemoglobin 18.7; platelet count

133,000.  Recent creatinine 0.9, BUN 7, electrolytes unremarkable.  Glucose 105,

AST 13, ALT 11, total bilirubin 0.8, albumin 3.1.  Vancomycin trough from 12/21

is 15.6.  Blood cultures from 12/19 negative to date.  Chest CTA showed no PE or

pneumonia.  Chest x-ray showed no acute pulmonary process.  Carotid Doppler

study showed no hemodynamically significant stenosis.  Lower extremity

ultrasound as mentioned above.  Foot x-ray as mentioned above.  Anaerobic,

aerobic culture pending.



IMPRESSION:

1.  Right lower extremity ulcerations and cellulitis.

2.  Arterial occlusive disease.  Vascular is following.

3.  History of deep venous thrombosis/pulmonary embolism.

4.  Tobacco dependence.

5.  Chronic obstructive pulmonary disease.



PLAN:  Continue the vancomycin and add Zosyn.  We will follow up on wound

culture results.  Continue to monitor laboratory values, temperature and renal

function closely.  Local wound care.



Thank you, Dr. Carrillo, for asking us to participate in this patient's care. 

Should you have further questions or concerns, please call.

 



______________________________

GIGI COLORADO MD DR:  MAYE/heather  JOB#:  [id number] / [id number]

DD:  12/22/2018 19:22  DT:  12/23/2018 06:12 Stable

## 2022-05-22 ENCOUNTER — HOSPITAL ENCOUNTER (EMERGENCY)
Dept: HOSPITAL 61 - ER | Age: 82
Discharge: HOME | End: 2022-05-22
Payer: MEDICARE

## 2022-05-22 VITALS — WEIGHT: 150.36 LBS | HEIGHT: 68 IN | BODY MASS INDEX: 22.79 KG/M2

## 2022-05-22 VITALS — DIASTOLIC BLOOD PRESSURE: 96 MMHG | SYSTOLIC BLOOD PRESSURE: 132 MMHG

## 2022-05-22 DIAGNOSIS — F17.200: ICD-10-CM

## 2022-05-22 DIAGNOSIS — K40.90: Primary | ICD-10-CM

## 2022-05-22 DIAGNOSIS — Z95.5: ICD-10-CM

## 2022-05-22 LAB
ALBUMIN SERPL-MCNC: 3.6 G/DL (ref 3.4–5)
ALBUMIN/GLOB SERPL: 0.8 {RATIO} (ref 1–1.7)
ALP SERPL-CCNC: 128 U/L (ref 46–116)
ALT SERPL-CCNC: 19 U/L (ref 16–63)
ANION GAP SERPL CALC-SCNC: 9 MMOL/L (ref 6–14)
AST SERPL-CCNC: 17 U/L (ref 15–37)
BASOPHILS # BLD AUTO: 0 X10^3/UL (ref 0–0.2)
BASOPHILS NFR BLD: 0 % (ref 0–3)
BILIRUB DIRECT SERPL-MCNC: 0.4 MG/DL (ref 0–0.2)
BILIRUB SERPL-MCNC: 1 MG/DL (ref 0.2–1)
BUN SERPL-MCNC: 15 MG/DL (ref 8–26)
BUN/CREAT SERPL: 13 (ref 6–20)
CALCIUM SERPL-MCNC: 9.9 MG/DL (ref 8.5–10.1)
CHLORIDE SERPL-SCNC: 102 MMOL/L (ref 98–107)
CO2 SERPL-SCNC: 31 MMOL/L (ref 21–32)
CREAT SERPL-MCNC: 1.2 MG/DL (ref 0.7–1.3)
EOSINOPHIL NFR BLD: 0 X10^3/UL (ref 0–0.7)
EOSINOPHIL NFR BLD: 1 % (ref 0–3)
ERYTHROCYTE [DISTWIDTH] IN BLOOD BY AUTOMATED COUNT: 16.8 % (ref 11.5–14.5)
GFR SERPLBLD BASED ON 1.73 SQ M-ARVRAT: 58.1 ML/MIN
GLUCOSE SERPL-MCNC: 145 MG/DL (ref 70–99)
HCT VFR BLD CALC: 57.5 % (ref 39–53)
HGB BLD-MCNC: 20 G/DL (ref 13–17.5)
LIPASE: 46 U/L (ref 73–393)
LYMPHOCYTES # BLD: 0.6 X10^3/UL (ref 1–4.8)
LYMPHOCYTES NFR BLD AUTO: 8 % (ref 24–48)
MCH RBC QN AUTO: 32 PG (ref 25–35)
MCHC RBC AUTO-ENTMCNC: 35 G/DL (ref 31–37)
MCV RBC AUTO: 91 FL (ref 79–100)
MONO #: 0.6 X10^3/UL (ref 0–1.1)
MONOCYTES NFR BLD: 9 % (ref 0–9)
NEUT #: 5.7 X10^3/UL (ref 1.8–7.7)
NEUTROPHILS NFR BLD AUTO: 82 % (ref 31–73)
PLATELET # BLD AUTO: 202 X10^3/UL (ref 140–400)
POTASSIUM SERPL-SCNC: 4 MMOL/L (ref 3.5–5.1)
PROT SERPL-MCNC: 8.2 G/DL (ref 6.4–8.2)
PROTHROMBIN TIME: 22.7 SEC (ref 11.7–14)
RBC # BLD AUTO: 6.29 X10^6/UL (ref 4.3–5.7)
SODIUM SERPL-SCNC: 142 MMOL/L (ref 136–145)
WBC # BLD AUTO: 6.9 X10^3/UL (ref 4–11)

## 2022-05-22 PROCEDURE — 86900 BLOOD TYPING SEROLOGIC ABO: CPT

## 2022-05-22 PROCEDURE — 85610 PROTHROMBIN TIME: CPT

## 2022-05-22 PROCEDURE — 82248 BILIRUBIN DIRECT: CPT

## 2022-05-22 PROCEDURE — 80053 COMPREHEN METABOLIC PANEL: CPT

## 2022-05-22 PROCEDURE — 74176 CT ABD & PELVIS W/O CONTRAST: CPT

## 2022-05-22 PROCEDURE — 86901 BLOOD TYPING SEROLOGIC RH(D): CPT

## 2022-05-22 PROCEDURE — 86850 RBC ANTIBODY SCREEN: CPT

## 2022-05-22 PROCEDURE — 93005 ELECTROCARDIOGRAM TRACING: CPT

## 2022-05-22 PROCEDURE — 85025 COMPLETE CBC W/AUTO DIFF WBC: CPT

## 2022-05-22 PROCEDURE — 83690 ASSAY OF LIPASE: CPT

## 2022-05-22 PROCEDURE — 36415 COLL VENOUS BLD VENIPUNCTURE: CPT

## 2022-05-22 NOTE — RAD
Abdominal and Pelvis CT, Without Contrast:



History:  Reason: abdominal pain / Spl. Instructions:  / History: 



Comparison: December 23, 2018.



Procedure: Axial images are obtained of the abdomen and pelvis, without IV or oral contrast.



Oral Contrast:  No



Findings:



Evaluation of solid organs is limited without contrast.



The gallbladder appears normal.



There is a fat-containing umbilical hernia.



There is aortic bifemoral bypass. There is ankle canal hernias continuing multiple loops of small bow
el in the right is much larger than the left and continues down into the scrotum.



The appendix is not well seen but could be within the right inguinal canal.



Liver: Normal.



Spleen: Normal.



Pancreas: Normal.





Adrenal Glands: Normal.



Kidneys: Normal.



There is no free air or free fluid.



There is no lymphadenopathy.





The urinary bladder appears normal.





There is no pericolonic inflammation identified.



Impression:



Large bilateral inguinal canal hernias right much worse than left containing numerous loops of small 
bowel. There is no CT evidence of obstruction or incarceration. Previously the inguinal canal hernias
 only contained fat. 



End impression



PQRS Compliance Statement:



One or more of the following individualized dose reduction techniques were utilized for this examinat
ion:  

1. Automated exposure control  

2. Adjustment of the mA and/or kV according to patient size  

3. Use of iterative reconstruction technique



Electronically signed by: Brady Grewal III, MD (5/22/2022 1:49 AM) [facility]

## 2022-05-22 NOTE — EKG
[facility]

              [address] [place] [address]

Test Date:    2022               Test Time:    00:45:15

Pat Name:     ABAD VIDALES              Department:   

Patient ID:   [id number]           Room:          

Gender:       M                        Technician:   

:          [id number]               Requested By: FRITZ RUGGIERO

Order Number: [id number]           Reading MD:   Bal Chong

                                 Measurements

Intervals                              Axis          

Rate:         84                       P:            69

AZ:           156                      QRS:          163

QRSD:         128                      T:            23

QT:           386                                    

QTc:          460                                    

                           Interpretive Statements

SINUS RHYTHM

ATRIAL PREMATURE COMPLEX(ES)

NON SPECIFIC INTRAVENTRICULAR BLOCK

RVH WITH REPOLARIZATION ABNORMALITY

NON SPECIFIC ST-T WAVE CHANGES

Electronically Signed On 2022 11:06:30 CDT by Bal Chong

## 2024-03-04 NOTE — PHYS DOC
*late entry*    Spoke with pts mother on Sat and scheduled appt for 03/04 with ENA Padilla.  Service to neuro order placed, mother states she will call Monday to set up that appt.  Phone number given for mother to call Dr. Reynolds's office to schedule   Past Medical History


Past Medical History:  Other


Additional Past Medical Histor:  CELLULITIS, back pain,BOWEL OBSTRUCTION


Past Surgical History:  Other


Additional Past Surgical Histo:  R LEG SURG,BOWEL OBSTRUCTION


Smoking Status:  Current Every Day Smoker


Alcohol Use:  Occasionally


Drug Use:  None





General Adult


EDM:


Chief Complaint:  ABDOMINAL PAIN





HPI:


HPI:


81-year-old male, past medical history CAD, DVT, on Xarelto, peripheral arterial

disease, right lower extremity stent versus bypass?,  Cellulitis, back pain, 

previous bowel obstruction, right inguinal hernia, presents with sudden onset 

right inguinal pain that woke him up from sleep tonight.  Upon ED arrival, 

patient states that his pain is resolved.  No fever, chills, nausea, vomiting, 

chest pain, shortness of breath.  No urinary complaints.  Patient endorses 

watery, dark, granular diarrhea which has been going on for several months.





Review of Systems:


Review of Systems:


Constitutional:   Denies fever or chills. []


Eyes:   Denies change in visual acuity. []


HENT:   Denies nasal congestion or sore throat. [] 


Respiratory:   Denies cough or shortness of breath. [] 


Cardiovascular:   Denies chest pain or edema. [] 


GI:   + R inguinal hernia pain, Denies abdominal pain, nausea, vomiting, bloody 

stools; +diarrhea. [] 


:  Denies dysuria. [] 


Musculoskeletal:   Denies back pain or joint pain. [] 


Integument:   Denies rash. [] 


Neurologic:   Denies headache, focal weakness or sensory changes. [] 


Endocrine:   Denies polyuria or polydipsia. [] 


Lymphatic:  Denies swollen glands. [] 


Psychiatric:  Denies depression or anxiety. []





Heart Score:


C/O Chest Pain:  No


Risk Factors:


Risk Factors:  DM, Current or recent (<one month) smoker, HTN, HLP, family 

history of CAD, obesity.


Risk Scores:


Score 0 - 3:  2.5% MACE over next 6 weeks - Discharge Home


Score 4 - 6:  20.3% MACE over next 6 weeks - Admit for Clinical Observation


Score 7 - 10:  72.7% MACE over next 6 weeks - Early Invasive Strategies





Allergies:


Allergies:





Allergies








Coded Allergies Type Severity Reaction Last Updated Verified


 


  No Known Drug Allergies    5/6/17 No











Physical Exam:


PE:





Constitutional: Elderly male, well nourished, no acute distress, non-toxic 

appearance. []


HENT: Hearing aids in, normocephalic, atraumatic, bilateral external ears 

normal, oropharynx moist, no oral exudates, nose normal. []


Eyes: PERRLA, EOMI, conjunctiva normal, no discharge. [] 


Neck: Normal range of motion, no tenderness, supple, no stridor. [] 


Cardiovascular:Heart rate regular rhythm, no murmur []


Lungs & Thorax:  Bilateral breath sounds clear to auscultation []


Abdomen: Bowel sounds normal, soft, no tenderness, no masses, no pulsatile 

masses. [] 


: + Large R inguinal hernia that is soft and reducible, no TTP, no 

warmth/firmness/erythema/or edema


Skin: Warm, dry, no erythema, no rash. [] 


Back: No tenderness, no CVA tenderness. [] 


Extremities: No tenderness, no cyanosis, no clubbing, ROM intact, no edema. [] 


Neurologic: Alert and oriented X 3, normal motor function, normal sensory 

function, no focal deficits noted. []


Psychologic: Affect normal, judgement normal, mood normal. []





Current Patient Data:


Labs:





                                Laboratory Tests








Test


 5/22/22


00:41 5/22/22


01:05


 


White Blood Count


 6.9 x10^3/uL


(4.0-11.0) 





 


Red Blood Count


 6.29 x10^6/uL


(4.30-5.70)  H 





 


Hemoglobin


 20.0 g/dL


(13.0-17.5)  H 





 


Hematocrit


 57.5 %


(39.0-53.0)  H 





 


Mean Corpuscular Volume


 91 fL ()


 





 


Mean Corpuscular Hemoglobin 32 pg (25-35)   


 


Mean Corpuscular Hemoglobin


Concent 35 g/dL


(31-37) 





 


Red Cell Distribution Width


 16.8 %


(11.5-14.5)  H 





 


Platelet Count


 202 x10^3/uL


(140-400) 





 


Neutrophils (%) (Auto) 82 % (31-73)  H 


 


Lymphocytes (%) (Auto) 8 % (24-48)  L 


 


Monocytes (%) (Auto) 9 % (0-9)   


 


Eosinophils (%) (Auto) 1 % (0-3)   


 


Basophils (%) (Auto) 0 % (0-3)   


 


Neutrophils # (Auto)


 5.7 x10^3/uL


(1.8-7.7) 





 


Lymphocytes # (Auto)


 0.6 x10^3/uL


(1.0-4.8)  L 





 


Monocytes # (Auto)


 0.6 x10^3/uL


(0.0-1.1) 





 


Eosinophils # (Auto)


 0.0 x10^3/uL


(0.0-0.7) 





 


Basophils # (Auto)


 0.0 x10^3/uL


(0.0-0.2) 





 


Prothrombin Time


 


 22.7 SEC


(11.7-14.0)  H


 


Prothrombin Time INR


 


 2.1 (0.8-1.1)


H


 


Sodium Level


 


 142 mmol/L


(136-145)


 


Potassium Level


 


 4.0 mmol/L


(3.5-5.1)


 


Chloride Level


 


 102 mmol/L


()


 


Carbon Dioxide Level


 


 31 mmol/L


(21-32)


 


Anion Gap  9 (6-14)  


 


Blood Urea Nitrogen


 


 15 mg/dL


(8-26)


 


Creatinine


 


 1.2 mg/dL


(0.7-1.3)


 


Estimated GFR


(Cockcroft-Gault) 


 58.1  





 


BUN/Creatinine Ratio  13 (6-20)  


 


Glucose Level


 


 145 mg/dL


(70-99)  H


 


Calcium Level


 


 9.9 mg/dL


(8.5-10.1)


 


Total Bilirubin


 


 1.0 mg/dL


(0.2-1.0)


 


Direct Bilirubin


 


 0.4 mg/dL


(0.0-0.2)  H


 


Aspartate Amino Transferase


(AST) 


 17 U/L (15-37)





 


Alanine Aminotransferase (ALT)


 


 19 U/L (16-63)





 


Alkaline Phosphatase


 


 128 U/L


()  H


 


Total Protein


 


 8.2 g/dL


(6.4-8.2)


 


Albumin


 


 3.6 g/dL


(3.4-5.0)


 


Albumin/Globulin Ratio


 


 0.8 (1.0-1.7)


L


 


Lipase


 


 46 U/L


()  L





                                Laboratory Tests


5/22/22 00:41








                                Laboratory Tests


5/22/22 01:05








Vital Signs:





                                   Vital Signs








  Date Time  Temp Pulse Resp B/P (MAP) Pulse Ox O2 Delivery O2 Flow Rate FiO2


 


5/22/22 00:15 97.4 90 17 138/65 (89) 97 Room Air  





 97.4       











EKG:


EKG:


[]





Radiology/Procedures:


Radiology/Procedures:


[]





Course & Med Decision Making:


Course & Med Decision Making


Pertinent Labs and Imaging studies reviewed. (See chart for details)





Additional Social History: PMD from non-affiliated facility. Patient Lives at 

home. Family History: Non-pertinent to today's complaint.





Nursing Notes Reviewed


Previous Medical Records requested via [facility]: Reviewed by me.


________________________________________





EMERGENT LABS AND DIAGNOSTIC STUDIES:





Results were reviewed and interpreted by me as below


CBC: Shows no evidence of leukocytosis or anemia. Platelet count is normal


Chemistry: Shows no electrolyte abnormalities


Otherwise within normal limits, unremarkable or as noted above.





PROCEDURE: CT ABDOMEN PELVIS WO CONTRAST


Impression:





Large bilateral inguinal canal hernias right much worse than left containing 

numerous loops of small bowel. There is no CT evidence of obstruction or 

incarceration. Previously the inguinal canal hernias only contained fat. 





________________________________________





EMERGENCY DEPARTMENT COURSE/ MEDICAL DECISION MAKING:





The patient was placed on a cardiac monitor, continuous pulse oximetry and was 

given supplemental oxygen.





I examined the patient, evaluated and addressed patient's chief complaint.





Patient with R inguinal hernia presents with sudden pain this evening that has 

since resolved since ED arrival. Hernia is soft and reducible. Low suspicion for

 incarceration. 


Given patient's complaing ot dark/granular diarrhea and on xarelto, I performed 

stool guaic which is negative.


Vitals wnl. Well appearing.





On re-assessment, patient feels well and has no complaints. Vitals wnl.


No obstruction or incarceration on CT. 


Per patient's son at bedside, patient had an appointment w/Gen Surg outpatient 

for hernia eval but he refused to go because they said they would not do surgery

 given his anticoagulation hx.


Encouraged to wear a hernia belt.


Noted elevated Hg at 20 which appears similar to previous labs, otherwise 

patient asymptomatic.





The patient understands that todays Emergency Department evaluation does not 

represent a comprehensive medical workup, and it is impossible to diagnose all 

possible illnesses from a single Emergency Department visit.


The patient verbalized understanding that it is absolutely necessary to have 

follow-up with regular primary care physician within 1-2 days for more detailed 

workup and continued exam. I explained the findings and plan to the patient, who

 expressed verbal understanding and agreed with plan for discharge and follow 

up. The patient was given after care instructions and welcomed to return to the 

ED for re-evaluation in 8-12 hours, especially for any new or worsening 

symptoms.


Patient's blood pressure was elevated (>120/80) but appears stable without ev

idence of end organ damage, malignant hypertension, hypertensive emergency or 

urgency.  The patient was counseled about the risks of hypertension and urged to

 pursue outpatient monitoring and therapy within a week with their primary care 

physician.





The patient was stable at the time of discharge.








DIAGNOSTIC IMPRESSION:





1. R inguinal hernia, no obstruction or gangrene











DISPOSITION:





Disposition: Discharge Home.





Condition: Improved





Follow-Up: PMD, Gen Surg





Prescriptions: None





Return to the Emergency Department for new or worsening symptoms.





Dragon Disclaimer:


Dragon Disclaimer:


This electronic medical record was generated, in whole or in part, using a voice

 recognition dictation system.





Departure


Departure


Impression:  


   Primary Impression:  


   Recurrent inguinal hernia of right side without obstruction or gangrene


Disposition:  01 HOME / SELF CARE / HOMELESS


Condition:  STABLE


Referrals:  


PEDRO HERNANDEZ MD (PCP)











FRITZ RUGGIERO MD                   May 22, 2022 01:58

## 2024-03-24 NOTE — PDOC
P.o. vitamin K started today   Provider Note


Provider Note


Spoke with pt briefly prior to transport taking him down for stress test. 

Discussed his arterial disease and need for surgery, however pending cardiac 

clearance and heme workup. He exhibited understanding. Please see full consult 

note for details. 


Pt is going for stress test and bone marrow biopsy today. Had CTA yesterday 

confirming severity of disease. 





Pt Will need aortofemoral reconstruction after the hemoglobin and hematocrit 

are controlled and after the workup is completed. He is undergoing preoperative 

cardiac assessment. His hemoglobin will need to be controlled around 15 g 

postoperatively and further duration as his polycythemia increases his risk of 

graft thrombosis. Pt does not have significant carotid stenosis, US done 12/22/ 18. 





Surgery scheduling pending the above.











DEBRA MEYERS Dec 24, 2018 08:44